# Patient Record
Sex: FEMALE | Race: WHITE | Employment: OTHER | URBAN - NONMETROPOLITAN AREA
[De-identification: names, ages, dates, MRNs, and addresses within clinical notes are randomized per-mention and may not be internally consistent; named-entity substitution may affect disease eponyms.]

---

## 2017-12-17 ENCOUNTER — APPOINTMENT (OUTPATIENT)
Dept: CT IMAGING | Age: 82
End: 2017-12-17
Payer: MEDICARE

## 2017-12-17 ENCOUNTER — APPOINTMENT (OUTPATIENT)
Dept: GENERAL RADIOLOGY | Age: 82
End: 2017-12-17
Payer: MEDICARE

## 2017-12-17 ENCOUNTER — HOSPITAL ENCOUNTER (EMERGENCY)
Age: 82
Discharge: HOME | End: 2017-12-17
Attending: EMERGENCY MEDICINE
Payer: MEDICARE

## 2017-12-17 VITALS
SYSTOLIC BLOOD PRESSURE: 116 MMHG | DIASTOLIC BLOOD PRESSURE: 69 MMHG | RESPIRATION RATE: 14 BRPM | BODY MASS INDEX: 21.26 KG/M2 | HEART RATE: 6 BPM | OXYGEN SATURATION: 96 % | WEIGHT: 120 LBS | HEIGHT: 63 IN | TEMPERATURE: 97.5 F

## 2017-12-17 DIAGNOSIS — R53.81 MALAISE AND FATIGUE: Primary | ICD-10-CM

## 2017-12-17 DIAGNOSIS — R53.83 MALAISE AND FATIGUE: Primary | ICD-10-CM

## 2017-12-17 LAB
ANION GAP SERPL CALCULATED.3IONS-SCNC: 14 MMOL/L (ref 7–19)
APTT: 25.1 SEC (ref 26–36.2)
BILIRUBIN URINE: NEGATIVE
BLOOD, URINE: NEGATIVE
BUN BLDV-MCNC: 33 MG/DL (ref 8–23)
CALCIUM SERPL-MCNC: 9.3 MG/DL (ref 8.2–9.6)
CHLORIDE BLD-SCNC: 101 MMOL/L (ref 98–111)
CLARITY: CLEAR
CO2: 21 MMOL/L (ref 22–29)
COLOR: NORMAL
CREAT SERPL-MCNC: 1.4 MG/DL (ref 0.5–0.9)
GFR NON-AFRICAN AMERICAN: 35
GLUCOSE BLD-MCNC: 117 MG/DL (ref 74–109)
GLUCOSE URINE: NEGATIVE MG/DL
HCT VFR BLD CALC: 32.7 % (ref 37–47)
HEMOGLOBIN: 10.7 G/DL (ref 12–16)
INR BLD: 1.03 (ref 0.88–1.18)
KETONES, URINE: NEGATIVE MG/DL
LEUKOCYTE ESTERASE, URINE: NEGATIVE
MCH RBC QN AUTO: 32.4 PG (ref 27–31)
MCHC RBC AUTO-ENTMCNC: 32.7 G/DL (ref 33–37)
MCV RBC AUTO: 99.1 FL (ref 81–99)
NITRITE, URINE: NEGATIVE
PDW BLD-RTO: 11.9 % (ref 11.5–14.5)
PH UA: 5.5
PLATELET # BLD: 187 K/UL (ref 130–400)
PMV BLD AUTO: 12.1 FL (ref 9.4–12.3)
POTASSIUM SERPL-SCNC: 4.8 MMOL/L (ref 3.5–5)
PROTEIN UA: NEGATIVE MG/DL
PROTHROMBIN TIME: 13.4 SEC (ref 12–14.6)
RBC # BLD: 3.3 M/UL (ref 4.2–5.4)
SODIUM BLD-SCNC: 136 MMOL/L (ref 136–145)
SPECIFIC GRAVITY UA: 1.02
TROPONIN: <0.01 NG/ML (ref 0–0.03)
TSH SERPL DL<=0.05 MIU/L-ACNC: 2.16 UIU/ML (ref 0.27–4.2)
UROBILINOGEN, URINE: 1 E.U./DL
WBC # BLD: 7.8 K/UL (ref 4.8–10.8)

## 2017-12-17 PROCEDURE — 84484 ASSAY OF TROPONIN QUANT: CPT

## 2017-12-17 PROCEDURE — 70450 CT HEAD/BRAIN W/O DYE: CPT

## 2017-12-17 PROCEDURE — 71010 XR CHEST PORTABLE: CPT

## 2017-12-17 PROCEDURE — 99284 EMERGENCY DEPT VISIT MOD MDM: CPT | Performed by: EMERGENCY MEDICINE

## 2017-12-17 PROCEDURE — 99285 EMERGENCY DEPT VISIT HI MDM: CPT

## 2017-12-17 PROCEDURE — 85027 COMPLETE CBC AUTOMATED: CPT

## 2017-12-17 PROCEDURE — 80048 BASIC METABOLIC PNL TOTAL CA: CPT

## 2017-12-17 PROCEDURE — 85610 PROTHROMBIN TIME: CPT

## 2017-12-17 PROCEDURE — 85730 THROMBOPLASTIN TIME PARTIAL: CPT

## 2017-12-17 PROCEDURE — 36415 COLL VENOUS BLD VENIPUNCTURE: CPT

## 2017-12-17 PROCEDURE — 84443 ASSAY THYROID STIM HORMONE: CPT

## 2017-12-17 PROCEDURE — 93005 ELECTROCARDIOGRAM TRACING: CPT

## 2017-12-17 PROCEDURE — 81003 URINALYSIS AUTO W/O SCOPE: CPT

## 2017-12-17 RX ORDER — POTASSIUM CITRATE 10 MEQ/1
TABLET, EXTENDED RELEASE ORAL 2 TIMES DAILY
COMMUNITY

## 2017-12-17 RX ORDER — 0.9 % SODIUM CHLORIDE 0.9 %
1000 INTRAVENOUS SOLUTION INTRAVENOUS ONCE
Status: DISCONTINUED | OUTPATIENT
Start: 2017-12-17 | End: 2017-12-17 | Stop reason: HOSPADM

## 2017-12-17 RX ORDER — LOSARTAN POTASSIUM 100 MG/1
100 TABLET ORAL DAILY
COMMUNITY
End: 2019-08-26

## 2017-12-17 RX ORDER — CARVEDILOL 25 MG/1
25 TABLET ORAL 2 TIMES DAILY WITH MEALS
COMMUNITY

## 2017-12-17 RX ORDER — NIFEDIPINE 90 MG/1
90 TABLET, FILM COATED, EXTENDED RELEASE ORAL DAILY
COMMUNITY
End: 2019-08-26

## 2017-12-17 ASSESSMENT — ENCOUNTER SYMPTOMS
PHOTOPHOBIA: 0
COUGH: 0
BACK PAIN: 0
SHORTNESS OF BREATH: 0
ABDOMINAL PAIN: 0
DIARRHEA: 0
RHINORRHEA: 0
SORE THROAT: 0
CHEST TIGHTNESS: 0
NAUSEA: 0
EYE PAIN: 0
VOMITING: 0

## 2017-12-17 NOTE — CARE COORDINATION
PT and family provided with carat DME info and PCP info on how to obtain walker or cane.  Electronically signed by Luis Manuel Ulrich on 12/17/2017 at 4:23 PM

## 2017-12-17 NOTE — ED PROVIDER NOTES
[facility]  eMERGENCY dEPARTMENT eNCOUnter      Pt Name: Kelly Sher  MRN: [id number]  [place] 9/11/1926  Date of evaluation: 12/17/2017  Provider: Harry Ro MD    [address] [place] [address]       Chief Complaint   Patient presents with    Hypotension       HISTORY OF PRESENT ILLNESS   (Location/Symptom, Timing/Onset, Context/Setting, Quality, Duration, Modifying Factors, Severity)  Note limiting factors. Kelly Sher is a 80 y.o. female who presents to the emergency department For lack of energy, poor appetite. Between 1 and 2 weeks ago the patient had gone to a clinic to get checked out. She states that there are a bunch of people that were sick with possibly \"the flu\" and thinks that that had potentially cause her problems. She states at that time they told her her blood pressure was borderline low with the systolics being between 90 and 756 and her diastolics being between 22L and 70s. Patient is mainly concerned because she gets very tired after walking shorter distances this has been progressive over the past couple weeks. Son notes that she has not had a great appetite and is not eating a lot of food, patient states that she is just not hungry and doesn't want to eat. She denies chest pain, fevers, difficult to breathing, abdominal pain, nausea, vomiting, diarrhea, headaches, vision changes or other associated symptoms. The patient has not had a good appetite or good by mouth intake, does not want to eat, nothing makes her symptoms better or worse. She does not use a cane or other assistive device during ambulation. She states that she feels that she needs to hold onto things while she walks that does not have difficulty with walking. Nursing Notes were reviewed. REVIEW OF SYSTEMS    (2-9 systems for level 4, 10 or more for level 5)     Review of Systems   Constitutional: Positive for activity change, appetite change and fatigue. Negative for chills and fever.    HENT: Negative for congestion, nosebleeds, rhinorrhea and sore throat. Eyes: Negative for photophobia, pain and visual disturbance. Respiratory: Negative for cough, chest tightness and shortness of breath. Cardiovascular: Negative for chest pain and palpitations. Gastrointestinal: Negative for abdominal pain, diarrhea, nausea and vomiting. Genitourinary: Negative for dysuria, flank pain and hematuria. Musculoskeletal: Negative for arthralgias, back pain, myalgias and neck pain. Skin: Negative for rash and wound. Neurological: Positive for weakness. Negative for dizziness, syncope, speech difficulty, light-headedness and headaches. Psychiatric/Behavioral: Negative for confusion, hallucinations and suicidal ideas. PAST MEDICAL HISTORY     Past Medical History:   Diagnosis Date    Hyperlipidemia     Hypertension        SURGICAL HISTORY       Past Surgical History:   Procedure Laterality Date    THYROIDECTOMY         CURRENT MEDICATIONS       Current Discharge Medication List      CONTINUE these medications which have NOT CHANGED    Details   potassium citrate (UROCIT-K) 10 MEQ (1080 MG) extended release tablet Take by mouth 2 times daily      losartan (COZAAR) 100 MG tablet Take 100 mg by mouth daily      carvedilol (COREG) 25 MG tablet Take 25 mg by mouth 2 times daily (with meals)      NIFEdipine (ADALAT CC) 90 MG extended release tablet Take 90 mg by mouth daily             ALLERGIES     Review of patient's allergies indicates no known allergies. FAMILY HISTORY     History reviewed. No pertinent family history. SOCIAL HISTORY       Social History     Social History    Marital status:       Spouse name: N/A    Number of children: N/A    Years of education: N/A     Social History Main Topics    Smoking status: Never Smoker    Smokeless tobacco: Never Used    Alcohol use No    Drug use: No    Sexual activity: Not Asked     Other Topics Concern    None     Social History Narrative    ST elevations or depressions with in contiguous leads that are consistent with STEMI or ischemia, poor baseline,  ms, QRS 95 ms,  ms. RADIOLOGY:   Non-plain film images such as CT, Ultrasound and MRI are read by the radiologist. Plain radiographic images are visualized and preliminarily interpreted by the emergency physician with the below findings:    CT Head WO Contrast   Final Result   Impression:   No acute infarction, hemorrhage, or mass. Signed by Dr Con Chen on 12/17/2017 4:20 PM      XR Chest Portable   Final Result   Impression:   No acute findings. Signed by Dr Con Chen on 12/17/2017 4:16 PM          LABS:  Labs Reviewed   APTT - Abnormal; Notable for the following:        Result Value    aPTT 25.1 (*)     All other components within normal limits   BASIC METABOLIC PANEL - Abnormal; Notable for the following:     CO2 21 (*)     Glucose 117 (*)     BUN 33 (*)     CREATININE 1.4 (*)     GFR Non- 35 (*)     All other components within normal limits   CBC - Abnormal; Notable for the following:     RBC 3.30 (*)     Hemoglobin 10.7 (*)     Hematocrit 32.7 (*)     MCV 99.1 (*)     MCH 32.4 (*)     MCHC 32.7 (*)     All other components within normal limits   PROTIME-INR   TSH WITHOUT REFLEX   TROPONIN   URINALYSIS       All other labs were within normal range or not returned as of this dictation. EMERGENCY DEPARTMENT COURSE and DIFFERENTIAL DIAGNOSIS/MDM:   Vitals:    Vitals:    12/17/17 1322   BP: 107/74   Pulse: 90   Resp: 18   Temp: 98.5 °F (36.9 °C)   SpO2: 94%   Weight: 120 lb (54.4 kg)   Height: 5' 3\" (1.6 m)       MDM  Number of Diagnoses or Management Options  Diagnosis management comments: 22-year-old female with progressive generalized weakness and difficulty with walking longer distances, poor appetite. We'll evaluate for any acute life-threatening causes including CNS, cardiovascular, infectious.  Low suspicion for serious life-threatening etiology at this point

## 2017-12-17 NOTE — ED TRIAGE NOTES
Pt to ED with c/o hypotension. Pt reports \"not feeling well\" after going to clinic for unrelated issue.   Pt reports symptoms x2 weeks

## 2017-12-18 LAB
EKG P AXIS: 94 DEGREES
EKG P-R INTERVAL: 236 MS
EKG Q-T INTERVAL: 380 MS
EKG QRS DURATION: 80 MS
EKG QTC CALCULATION (BAZETT): 406 MS
EKG T AXIS: 23 DEGREES

## 2018-01-18 ENCOUNTER — HOSPITAL ENCOUNTER (INPATIENT)
Age: 83
LOS: 3 days | Discharge: HOME | DRG: 470 | End: 2018-01-21
Attending: FAMILY MEDICINE | Admitting: INTERNAL MEDICINE
Payer: MEDICARE

## 2018-01-18 ENCOUNTER — ANESTHESIA (OUTPATIENT)
Dept: OPERATING ROOM | Age: 83
DRG: 470 | End: 2018-01-18
Payer: MEDICARE

## 2018-01-18 ENCOUNTER — APPOINTMENT (OUTPATIENT)
Dept: GENERAL RADIOLOGY | Age: 83
DRG: 470 | End: 2018-01-18
Payer: MEDICARE

## 2018-01-18 ENCOUNTER — ANESTHESIA EVENT (OUTPATIENT)
Dept: OPERATING ROOM | Age: 83
DRG: 470 | End: 2018-01-18
Payer: MEDICARE

## 2018-01-18 VITALS
OXYGEN SATURATION: 100 % | DIASTOLIC BLOOD PRESSURE: 63 MMHG | SYSTOLIC BLOOD PRESSURE: 154 MMHG | RESPIRATION RATE: 4 BRPM | TEMPERATURE: 97.4 F

## 2018-01-18 DIAGNOSIS — S72.001A CLOSED FRACTURE OF RIGHT HIP, INITIAL ENCOUNTER (HCC): Primary | ICD-10-CM

## 2018-01-18 LAB
ABO/RH: NORMAL
ALBUMIN SERPL-MCNC: 3.4 G/DL (ref 3.5–5.2)
ALP BLD-CCNC: 62 U/L (ref 35–104)
ALT SERPL-CCNC: 14 U/L (ref 5–33)
ANION GAP SERPL CALCULATED.3IONS-SCNC: 12 MMOL/L (ref 7–19)
ANTIBODY SCREEN: NORMAL
AST SERPL-CCNC: 22 U/L (ref 5–32)
BASOPHILS ABSOLUTE: 0 K/UL (ref 0–0.2)
BASOPHILS RELATIVE PERCENT: 0.3 % (ref 0–1)
BILIRUB SERPL-MCNC: 1 MG/DL (ref 0.2–1.2)
BUN BLDV-MCNC: 19 MG/DL (ref 8–23)
CALCIUM SERPL-MCNC: 8.8 MG/DL (ref 8.2–9.6)
CHLORIDE BLD-SCNC: 94 MMOL/L (ref 98–111)
CO2: 25 MMOL/L (ref 22–29)
CREAT SERPL-MCNC: 0.8 MG/DL (ref 0.5–0.9)
EOSINOPHILS ABSOLUTE: 0.2 K/UL (ref 0–0.6)
EOSINOPHILS RELATIVE PERCENT: 2 % (ref 0–5)
GFR NON-AFRICAN AMERICAN: >60
GLUCOSE BLD-MCNC: 128 MG/DL (ref 74–109)
HCT VFR BLD CALC: 30.4 % (ref 37–47)
HEMOGLOBIN: 9.7 G/DL (ref 12–16)
INR BLD: 1.02 (ref 0.88–1.18)
LYMPHOCYTES ABSOLUTE: 0.6 K/UL (ref 1.1–4.5)
LYMPHOCYTES RELATIVE PERCENT: 5.8 % (ref 20–40)
MCH RBC QN AUTO: 32.6 PG (ref 27–31)
MCHC RBC AUTO-ENTMCNC: 31.9 G/DL (ref 33–37)
MCV RBC AUTO: 102 FL (ref 81–99)
MONOCYTES ABSOLUTE: 0.5 K/UL (ref 0–0.9)
MONOCYTES RELATIVE PERCENT: 4.1 % (ref 0–10)
NEUTROPHILS ABSOLUTE: 9.5 K/UL (ref 1.5–7.5)
NEUTROPHILS RELATIVE PERCENT: 87.4 % (ref 50–65)
PDW BLD-RTO: 14.8 % (ref 11.5–14.5)
PLATELET # BLD: 221 K/UL (ref 130–400)
PMV BLD AUTO: 11.4 FL (ref 9.4–12.3)
POTASSIUM SERPL-SCNC: 4.3 MMOL/L (ref 3.5–5)
PROTHROMBIN TIME: 13.3 SEC (ref 12–14.6)
RBC # BLD: 2.98 M/UL (ref 4.2–5.4)
SODIUM BLD-SCNC: 131 MMOL/L (ref 136–145)
TOTAL PROTEIN: 6.1 G/DL (ref 6.6–8.7)
TROPONIN: <0.01 NG/ML (ref 0–0.03)
WBC # BLD: 10.9 K/UL (ref 4.8–10.8)

## 2018-01-18 PROCEDURE — 7100000000 HC PACU RECOVERY - FIRST 15 MIN: Performed by: ORTHOPAEDIC SURGERY

## 2018-01-18 PROCEDURE — 2580000003 HC RX 258: Performed by: FAMILY MEDICINE

## 2018-01-18 PROCEDURE — 80053 COMPREHEN METABOLIC PANEL: CPT

## 2018-01-18 PROCEDURE — 3700000001 HC ADD 15 MINUTES (ANESTHESIA): Performed by: ORTHOPAEDIC SURGERY

## 2018-01-18 PROCEDURE — C1776 JOINT DEVICE (IMPLANTABLE): HCPCS | Performed by: ORTHOPAEDIC SURGERY

## 2018-01-18 PROCEDURE — 2580000003 HC RX 258: Performed by: ORTHOPAEDIC SURGERY

## 2018-01-18 PROCEDURE — C1729 CATH, DRAINAGE: HCPCS | Performed by: ORTHOPAEDIC SURGERY

## 2018-01-18 PROCEDURE — 99285 EMERGENCY DEPT VISIT HI MDM: CPT

## 2018-01-18 PROCEDURE — 0SRR0J9 REPLACEMENT OF RIGHT HIP JOINT, FEMORAL SURFACE WITH SYNTHETIC SUBSTITUTE, CEMENTED, OPEN APPROACH: ICD-10-PCS | Performed by: ORTHOPAEDIC SURGERY

## 2018-01-18 PROCEDURE — 99285 EMERGENCY DEPT VISIT HI MDM: CPT | Performed by: FAMILY MEDICINE

## 2018-01-18 PROCEDURE — 2500000003 HC RX 250 WO HCPCS

## 2018-01-18 PROCEDURE — 71045 X-RAY EXAM CHEST 1 VIEW: CPT

## 2018-01-18 PROCEDURE — 73502 X-RAY EXAM HIP UNI 2-3 VIEWS: CPT

## 2018-01-18 PROCEDURE — 36415 COLL VENOUS BLD VENIPUNCTURE: CPT

## 2018-01-18 PROCEDURE — 1210000000 HC MED SURG R&B

## 2018-01-18 PROCEDURE — 86850 RBC ANTIBODY SCREEN: CPT

## 2018-01-18 PROCEDURE — 6360000002 HC RX W HCPCS: Performed by: FAMILY MEDICINE

## 2018-01-18 PROCEDURE — 2500000003 HC RX 250 WO HCPCS: Performed by: ORTHOPAEDIC SURGERY

## 2018-01-18 PROCEDURE — C1713 ANCHOR/SCREW BN/BN,TIS/BN: HCPCS | Performed by: ORTHOPAEDIC SURGERY

## 2018-01-18 PROCEDURE — 7100000001 HC PACU RECOVERY - ADDTL 15 MIN: Performed by: ORTHOPAEDIC SURGERY

## 2018-01-18 PROCEDURE — 3700000000 HC ANESTHESIA ATTENDED CARE: Performed by: ORTHOPAEDIC SURGERY

## 2018-01-18 PROCEDURE — 3600000015 HC SURGERY LEVEL 5 ADDTL 15MIN: Performed by: ORTHOPAEDIC SURGERY

## 2018-01-18 PROCEDURE — 84484 ASSAY OF TROPONIN QUANT: CPT

## 2018-01-18 PROCEDURE — A4364 ADHESIVE, LIQUID OR EQUAL: HCPCS | Performed by: ORTHOPAEDIC SURGERY

## 2018-01-18 PROCEDURE — 99222 1ST HOSP IP/OBS MODERATE 55: CPT | Performed by: INTERNAL MEDICINE

## 2018-01-18 PROCEDURE — 6360000002 HC RX W HCPCS

## 2018-01-18 PROCEDURE — 85610 PROTHROMBIN TIME: CPT

## 2018-01-18 PROCEDURE — A6252 ABSORPT DRG >16 <=48 W/O BDR: HCPCS | Performed by: ORTHOPAEDIC SURGERY

## 2018-01-18 PROCEDURE — 6370000000 HC RX 637 (ALT 250 FOR IP): Performed by: ORTHOPAEDIC SURGERY

## 2018-01-18 PROCEDURE — 6360000002 HC RX W HCPCS: Performed by: ORTHOPAEDIC SURGERY

## 2018-01-18 PROCEDURE — C1769 GUIDE WIRE: HCPCS | Performed by: ORTHOPAEDIC SURGERY

## 2018-01-18 PROCEDURE — 2720000001 HC MISC SURG SUPPLY STERILE $51-500: Performed by: ORTHOPAEDIC SURGERY

## 2018-01-18 PROCEDURE — 3600000005 HC SURGERY LEVEL 5 BASE: Performed by: ORTHOPAEDIC SURGERY

## 2018-01-18 PROCEDURE — 86901 BLOOD TYPING SEROLOGIC RH(D): CPT

## 2018-01-18 PROCEDURE — 3209999900 FLUORO FOR SURGICAL PROCEDURES

## 2018-01-18 PROCEDURE — 2780000010 HC IMPLANT OTHER: Performed by: ORTHOPAEDIC SURGERY

## 2018-01-18 PROCEDURE — 86900 BLOOD TYPING SEROLOGIC ABO: CPT

## 2018-01-18 PROCEDURE — 85025 COMPLETE CBC W/AUTO DIFF WBC: CPT

## 2018-01-18 PROCEDURE — 93005 ELECTROCARDIOGRAM TRACING: CPT

## 2018-01-18 DEVICE — KIT BNE CEM PREP FEM QUIK-USE 1 PER CA: Type: IMPLANTABLE DEVICE | Site: HIP | Status: FUNCTIONAL

## 2018-01-18 DEVICE — IMPLANTABLE DEVICE
Type: IMPLANTABLE DEVICE | Site: HIP | Status: FUNCTIONAL
Brand: SIGNATURE FEMORAL HEAD

## 2018-01-18 DEVICE — BIPOLAR HEAD SIZE 28/47
Type: IMPLANTABLE DEVICE | Site: HIP | Status: FUNCTIONAL
Brand: PAXEON BIPOLAR HEAD

## 2018-01-18 DEVICE — DISCONTINUED USE 416978 CEMENT PALACOS R SING DOSE 40GR: Type: IMPLANTABLE DEVICE | Site: HIP | Status: FUNCTIONAL

## 2018-01-18 DEVICE — ORIGIN STEM, STANDARD OFFSET SIZE 13 COLLARED
Type: IMPLANTABLE DEVICE | Site: HIP | Status: FUNCTIONAL
Brand: PAXEON ORIGIN

## 2018-01-18 DEVICE — CEMENT BNE 20ML 40GM HALF DOSE PMMA W/O GENT HI VISC RADPQ: Type: IMPLANTABLE DEVICE | Site: HIP | Status: FUNCTIONAL

## 2018-01-18 RX ORDER — HYDROMORPHONE HCL 110MG/55ML
0.5 PATIENT CONTROLLED ANALGESIA SYRINGE INTRAVENOUS EVERY 5 MIN PRN
Status: DISCONTINUED | OUTPATIENT
Start: 2018-01-18 | End: 2018-01-18 | Stop reason: HOSPADM

## 2018-01-18 RX ORDER — FENTANYL CITRATE 50 UG/ML
50 INJECTION, SOLUTION INTRAMUSCULAR; INTRAVENOUS
Status: ACTIVE | OUTPATIENT
Start: 2018-01-18 | End: 2018-01-18

## 2018-01-18 RX ORDER — HYDRALAZINE HYDROCHLORIDE 20 MG/ML
5 INJECTION INTRAMUSCULAR; INTRAVENOUS EVERY 10 MIN PRN
Status: DISCONTINUED | OUTPATIENT
Start: 2018-01-18 | End: 2018-01-18 | Stop reason: HOSPADM

## 2018-01-18 RX ORDER — EPHEDRINE SULFATE 50 MG/ML
INJECTION, SOLUTION INTRAVENOUS PRN
Status: DISCONTINUED | OUTPATIENT
Start: 2018-01-18 | End: 2018-01-18 | Stop reason: SDUPTHER

## 2018-01-18 RX ORDER — MORPHINE SULFATE 4 MG/ML
2 INJECTION, SOLUTION INTRAMUSCULAR; INTRAVENOUS EVERY 5 MIN PRN
Status: DISCONTINUED | OUTPATIENT
Start: 2018-01-18 | End: 2018-01-18 | Stop reason: HOSPADM

## 2018-01-18 RX ORDER — MEPERIDINE HYDROCHLORIDE 50 MG/ML
12.5 INJECTION INTRAMUSCULAR; INTRAVENOUS; SUBCUTANEOUS EVERY 5 MIN PRN
Status: DISCONTINUED | OUTPATIENT
Start: 2018-01-18 | End: 2018-01-18 | Stop reason: HOSPADM

## 2018-01-18 RX ORDER — MORPHINE SULFATE 4 MG/ML
4 INJECTION, SOLUTION INTRAMUSCULAR; INTRAVENOUS EVERY 5 MIN PRN
Status: DISCONTINUED | OUTPATIENT
Start: 2018-01-18 | End: 2018-01-18 | Stop reason: HOSPADM

## 2018-01-18 RX ORDER — LABETALOL HYDROCHLORIDE 5 MG/ML
5 INJECTION, SOLUTION INTRAVENOUS EVERY 10 MIN PRN
Status: DISCONTINUED | OUTPATIENT
Start: 2018-01-18 | End: 2018-01-18 | Stop reason: HOSPADM

## 2018-01-18 RX ORDER — ROCURONIUM BROMIDE 10 MG/ML
INJECTION, SOLUTION INTRAVENOUS PRN
Status: DISCONTINUED | OUTPATIENT
Start: 2018-01-18 | End: 2018-01-18 | Stop reason: SDUPTHER

## 2018-01-18 RX ORDER — SUCCINYLCHOLINE CHLORIDE 20 MG/ML
INJECTION INTRAMUSCULAR; INTRAVENOUS PRN
Status: DISCONTINUED | OUTPATIENT
Start: 2018-01-18 | End: 2018-01-18 | Stop reason: SDUPTHER

## 2018-01-18 RX ORDER — DIPHENHYDRAMINE HYDROCHLORIDE 50 MG/ML
12.5 INJECTION INTRAMUSCULAR; INTRAVENOUS
Status: DISCONTINUED | OUTPATIENT
Start: 2018-01-18 | End: 2018-01-18 | Stop reason: HOSPADM

## 2018-01-18 RX ORDER — ONDANSETRON 2 MG/ML
2 INJECTION INTRAMUSCULAR; INTRAVENOUS ONCE
Status: COMPLETED | OUTPATIENT
Start: 2018-01-18 | End: 2018-01-18

## 2018-01-18 RX ORDER — MORPHINE SULFATE 4 MG/ML
2 INJECTION, SOLUTION INTRAMUSCULAR; INTRAVENOUS ONCE
Status: COMPLETED | OUTPATIENT
Start: 2018-01-18 | End: 2018-01-18

## 2018-01-18 RX ORDER — ENALAPRILAT 2.5 MG/2ML
1.25 INJECTION INTRAVENOUS
Status: DISCONTINUED | OUTPATIENT
Start: 2018-01-18 | End: 2018-01-18 | Stop reason: HOSPADM

## 2018-01-18 RX ORDER — SODIUM CHLORIDE, SODIUM LACTATE, POTASSIUM CHLORIDE, CALCIUM CHLORIDE 600; 310; 30; 20 MG/100ML; MG/100ML; MG/100ML; MG/100ML
INJECTION, SOLUTION INTRAVENOUS CONTINUOUS
Status: DISCONTINUED | OUTPATIENT
Start: 2018-01-18 | End: 2018-01-19

## 2018-01-18 RX ORDER — LIDOCAINE HYDROCHLORIDE 10 MG/ML
INJECTION, SOLUTION EPIDURAL; INFILTRATION; INTRACAUDAL; PERINEURAL PRN
Status: DISCONTINUED | OUTPATIENT
Start: 2018-01-18 | End: 2018-01-18 | Stop reason: SDUPTHER

## 2018-01-18 RX ORDER — SODIUM CHLORIDE 0.9 % (FLUSH) 0.9 %
10 SYRINGE (ML) INJECTION EVERY 12 HOURS SCHEDULED
Status: DISCONTINUED | OUTPATIENT
Start: 2018-01-18 | End: 2018-01-19

## 2018-01-18 RX ORDER — HYDROMORPHONE HCL 110MG/55ML
0.25 PATIENT CONTROLLED ANALGESIA SYRINGE INTRAVENOUS EVERY 5 MIN PRN
Status: DISCONTINUED | OUTPATIENT
Start: 2018-01-18 | End: 2018-01-18 | Stop reason: HOSPADM

## 2018-01-18 RX ORDER — PROMETHAZINE HYDROCHLORIDE 25 MG/ML
6.25 INJECTION, SOLUTION INTRAMUSCULAR; INTRAVENOUS
Status: DISCONTINUED | OUTPATIENT
Start: 2018-01-18 | End: 2018-01-18 | Stop reason: HOSPADM

## 2018-01-18 RX ORDER — PROPOFOL 10 MG/ML
INJECTION, EMULSION INTRAVENOUS PRN
Status: DISCONTINUED | OUTPATIENT
Start: 2018-01-18 | End: 2018-01-18 | Stop reason: SDUPTHER

## 2018-01-18 RX ORDER — DEXAMETHASONE SODIUM PHOSPHATE 10 MG/ML
INJECTION INTRAMUSCULAR; INTRAVENOUS PRN
Status: DISCONTINUED | OUTPATIENT
Start: 2018-01-18 | End: 2018-01-18 | Stop reason: SDUPTHER

## 2018-01-18 RX ORDER — FENTANYL CITRATE 50 UG/ML
25 INJECTION, SOLUTION INTRAMUSCULAR; INTRAVENOUS
Status: ACTIVE | OUTPATIENT
Start: 2018-01-18 | End: 2018-01-18

## 2018-01-18 RX ORDER — TRANEXAMIC ACID 100 MG/ML
INJECTION, SOLUTION INTRAVENOUS PRN
Status: DISCONTINUED | OUTPATIENT
Start: 2018-01-18 | End: 2018-01-18 | Stop reason: SDUPTHER

## 2018-01-18 RX ORDER — SODIUM CHLORIDE 0.9 % (FLUSH) 0.9 %
10 SYRINGE (ML) INJECTION PRN
Status: DISCONTINUED | OUTPATIENT
Start: 2018-01-18 | End: 2018-01-19

## 2018-01-18 RX ORDER — ONDANSETRON 2 MG/ML
INJECTION INTRAMUSCULAR; INTRAVENOUS PRN
Status: DISCONTINUED | OUTPATIENT
Start: 2018-01-18 | End: 2018-01-18 | Stop reason: SDUPTHER

## 2018-01-18 RX ORDER — METOCLOPRAMIDE HYDROCHLORIDE 5 MG/ML
10 INJECTION INTRAMUSCULAR; INTRAVENOUS
Status: DISCONTINUED | OUTPATIENT
Start: 2018-01-18 | End: 2018-01-18 | Stop reason: HOSPADM

## 2018-01-18 RX ORDER — MIDAZOLAM HYDROCHLORIDE 1 MG/ML
2 INJECTION INTRAMUSCULAR; INTRAVENOUS
Status: ACTIVE | OUTPATIENT
Start: 2018-01-18 | End: 2018-01-18

## 2018-01-18 RX ORDER — CEFAZOLIN SODIUM 1 G/3ML
INJECTION, POWDER, FOR SOLUTION INTRAMUSCULAR; INTRAVENOUS PRN
Status: DISCONTINUED | OUTPATIENT
Start: 2018-01-18 | End: 2018-01-18 | Stop reason: SDUPTHER

## 2018-01-18 RX ORDER — SODIUM CHLORIDE 9 MG/ML
INJECTION, SOLUTION INTRAVENOUS CONTINUOUS
Status: DISCONTINUED | OUTPATIENT
Start: 2018-01-18 | End: 2018-01-19

## 2018-01-18 RX ORDER — GLYCOPYRROLATE 0.2 MG/ML
INJECTION INTRAMUSCULAR; INTRAVENOUS PRN
Status: DISCONTINUED | OUTPATIENT
Start: 2018-01-18 | End: 2018-01-18 | Stop reason: SDUPTHER

## 2018-01-18 RX ORDER — LIDOCAINE HYDROCHLORIDE 10 MG/ML
1 INJECTION, SOLUTION EPIDURAL; INFILTRATION; INTRACAUDAL; PERINEURAL
Status: ACTIVE | OUTPATIENT
Start: 2018-01-18 | End: 2018-01-18

## 2018-01-18 RX ORDER — FENTANYL CITRATE 50 UG/ML
INJECTION, SOLUTION INTRAMUSCULAR; INTRAVENOUS PRN
Status: DISCONTINUED | OUTPATIENT
Start: 2018-01-18 | End: 2018-01-18 | Stop reason: SDUPTHER

## 2018-01-18 RX ADMIN — PROPOFOL 160 MG: 10 INJECTION, EMULSION INTRAVENOUS at 17:53

## 2018-01-18 RX ADMIN — GLYCOPYRROLATE 0.2 MG: 0.2 INJECTION, SOLUTION INTRAMUSCULAR; INTRAVENOUS at 18:03

## 2018-01-18 RX ADMIN — PHENYLEPHRINE HYDROCHLORIDE 200 MCG: 10 INJECTION INTRAVENOUS at 18:41

## 2018-01-18 RX ADMIN — EPHEDRINE SULFATE 10 MG: 50 INJECTION, SOLUTION INTRAMUSCULAR; INTRAVENOUS; SUBCUTANEOUS at 18:06

## 2018-01-18 RX ADMIN — SODIUM CHLORIDE: 9 INJECTION, SOLUTION INTRAVENOUS at 19:16

## 2018-01-18 RX ADMIN — TRANEXAMIC ACID 1000 MG: 100 INJECTION, SOLUTION INTRAVENOUS at 18:07

## 2018-01-18 RX ADMIN — GLYCOPYRROLATE 0.4 MG: 0.2 INJECTION, SOLUTION INTRAMUSCULAR; INTRAVENOUS at 19:00

## 2018-01-18 RX ADMIN — SODIUM CHLORIDE 125 ML: 9 INJECTION, SOLUTION INTRAVENOUS at 16:20

## 2018-01-18 RX ADMIN — FENTANYL CITRATE 100 MCG: 50 INJECTION, SOLUTION INTRAMUSCULAR; INTRAVENOUS at 19:20

## 2018-01-18 RX ADMIN — PHENYLEPHRINE HYDROCHLORIDE 200 MCG: 10 INJECTION INTRAVENOUS at 18:27

## 2018-01-18 RX ADMIN — FENTANYL CITRATE 100 MCG: 50 INJECTION, SOLUTION INTRAMUSCULAR; INTRAVENOUS at 17:49

## 2018-01-18 RX ADMIN — FENTANYL CITRATE 50 MCG: 50 INJECTION, SOLUTION INTRAMUSCULAR; INTRAVENOUS at 19:07

## 2018-01-18 RX ADMIN — TRANEXAMIC ACID 1000 MG: 100 INJECTION, SOLUTION INTRAVENOUS at 19:03

## 2018-01-18 RX ADMIN — Medication 2 MG: at 16:20

## 2018-01-18 RX ADMIN — CEFAZOLIN 2000 MG: 1 INJECTION, POWDER, FOR SOLUTION INTRAMUSCULAR; INTRAVENOUS; PARENTERAL at 18:06

## 2018-01-18 RX ADMIN — EPHEDRINE SULFATE 10 MG: 50 INJECTION, SOLUTION INTRAMUSCULAR; INTRAVENOUS; SUBCUTANEOUS at 18:00

## 2018-01-18 RX ADMIN — ONDANSETRON 2 MG: 2 INJECTION INTRAMUSCULAR; INTRAVENOUS at 16:20

## 2018-01-18 RX ADMIN — LIDOCAINE HYDROCHLORIDE 50 MG: 10 INJECTION, SOLUTION EPIDURAL; INFILTRATION; INTRACAUDAL; PERINEURAL at 17:53

## 2018-01-18 RX ADMIN — DEXAMETHASONE SODIUM PHOSPHATE 10 MG: 10 INJECTION INTRAMUSCULAR; INTRAVENOUS at 18:00

## 2018-01-18 RX ADMIN — ROCURONIUM BROMIDE 20 MG: 10 INJECTION INTRAVENOUS at 18:10

## 2018-01-18 RX ADMIN — ONDANSETRON HYDROCHLORIDE 4 MG: 2 SOLUTION INTRAMUSCULAR; INTRAVENOUS at 18:00

## 2018-01-18 RX ADMIN — Medication 140 MG: at 17:53

## 2018-01-18 RX ADMIN — NEOSTIGMINE METHYLSULFATE 4 MG: 1 INJECTION, SOLUTION INTRAMUSCULAR; INTRAVENOUS; SUBCUTANEOUS at 19:03

## 2018-01-18 ASSESSMENT — LIFESTYLE VARIABLES: SMOKING_STATUS: 0

## 2018-01-18 ASSESSMENT — PAIN DESCRIPTION - LOCATION: LOCATION: HIP

## 2018-01-18 ASSESSMENT — ENCOUNTER SYMPTOMS
PHOTOPHOBIA: 0
WHEEZING: 0
SINUS PAIN: 0
RHINORRHEA: 0
SHORTNESS OF BREATH: 0
NAUSEA: 0
BLOOD IN STOOL: 0
BACK PAIN: 0
COLOR CHANGE: 0
DIARRHEA: 0
VOMITING: 0
CHEST TIGHTNESS: 0
ABDOMINAL PAIN: 0
ABDOMINAL DISTENTION: 0
CONSTIPATION: 0
COUGH: 0

## 2018-01-18 ASSESSMENT — PAIN DESCRIPTION - PAIN TYPE: TYPE: ACUTE PAIN

## 2018-01-18 ASSESSMENT — PAIN SCALES - GENERAL
PAINLEVEL_OUTOF10: 0
PAINLEVEL_OUTOF10: 5
PAINLEVEL_OUTOF10: 0
PAINLEVEL_OUTOF10: 2
PAINLEVEL_OUTOF10: 0

## 2018-01-18 ASSESSMENT — PAIN DESCRIPTION - ORIENTATION: ORIENTATION: RIGHT

## 2018-01-18 NOTE — ED PROVIDER NOTES
[facility]  eMERGENCY dEPARTMENT eNCOUnter      Pt Name: Celeste Calix  MRN: [id number]  [place] 9/11/1926  Date of evaluation: 1/18/2018  Provider: Ehsan Del Toro MD    [address] [place] [address]       Chief Complaint   Patient presents with   [place] Stairs    Hip Pain     right         HISTORY OF PRESENT ILLNESS   (Location/Symptom, Timing/Onset, Context/Setting, Quality, Duration, Modifying Factors, Severity)  Note limiting factors. Celeste Calix is a 80 y.o. female who presents to the emergency department Fall with right sided hip pain. Patient states that this morning at 3 AM she was walking into the kitchen with the lights off she had reached for the refrigerator for balance but was an error that the refrigerator was there ended up falling in the floor. Patient immediately cried out for family they put her in the bed patient states she has been unable to walk any movement causes severe pain even remaining still causes mild discomfort of the right hip. She incurred no other injury such as head injury no loss of consciousness. She states the last time that she ate was last night's upper has not ate today. HPI    Nursing Notes were reviewed. REVIEW OF SYSTEMS    (2-9 systems for level 4, 10 or more for level 5)     Review of Systems   Constitutional: Positive for appetite change (Decreased appetite over the past year or so family has started ensure which is seemed to help some). Negative for activity change, chills, diaphoresis, fatigue, fever and unexpected weight change. HENT: Negative for congestion, rhinorrhea and sinus pain. Eyes: Negative for photophobia. Respiratory: Negative for cough, chest tightness, shortness of breath and wheezing. Cardiovascular: Negative for chest pain, palpitations and leg swelling. Gastrointestinal: Negative for abdominal distention, abdominal pain, blood in stool, constipation, diarrhea, nausea and vomiting.    Endocrine: Negative for cold intolerance and Scale Score: 15        PHYSICAL EXAM    (up to 7 for level 4, 8 or more for level 5)     ED Triage Vitals [01/18/18 1352]   BP Temp Temp src Pulse Resp SpO2 Height Weight   111/70 98.3 °F (36.8 °C) -- 88 16 92 % 5' (1.524 m) 120 lb (54.4 kg)       Physical Exam   Constitutional: She is oriented to person, place, and time. She appears well-developed and well-nourished. No distress. HENT:   Head: Normocephalic. Eyes: Conjunctivae and EOM are normal. Pupils are equal, round, and reactive to light. Neck: Normal range of motion. Neck supple. No tracheal deviation present. No thyromegaly present. Cardiovascular: Normal rate, normal heart sounds and intact distal pulses. Pulmonary/Chest: Effort normal and breath sounds normal.   Abdominal: Soft. Bowel sounds are normal. She exhibits no distension and no mass. There is no tenderness. There is no rebound and no guarding. Musculoskeletal: She exhibits no edema. Right hip: She exhibits decreased range of motion, tenderness and bony tenderness. Legs:  Neurological: She is alert and oriented to person, place, and time. No cranial nerve deficit. Skin: Skin is warm and dry. No rash noted. She is not diaphoretic. No erythema. No pallor. Psychiatric: She has a normal mood and affect. Her behavior is normal.       DIAGNOSTIC RESULTS     EKG: All EKG's are interpreted by the Emergency Department Physician who either signs or Co-signs this chart in the absence of a cardiologist.        RADIOLOGY:   Non-plain film images such as CT, Ultrasound and MRI are read by the radiologist. Plain radiographic images are visualized and preliminarily interpreted by the emergency physician with the below findings:          XR HIP 2-3 VW W PELVIS RIGHT   Final Result   Status post right hip arthroplasty without apparent   complication. Signed by Dr Shelli Murdock.  Gricel on 1/18/2018 7:39 PM      XR HIP 2-3 VW W PELVIS RIGHT   Final Result   A moderately impacted noted below, none     Procedures    FINAL IMPRESSION      1.  Closed fracture of right hip, initial encounter [facility])          [address] Admitted 01/18/2018 05:31:22 PM      PATIENT REFERRED TO:  Lilian Snider  [phone]            DISCHARGE MEDICATIONS:  Current Discharge Medication List             (Please note that portions of this note were completed with a voice recognition program.  Efforts were made to edit the dictations but occasionally words are mis-transcribed.)    Marshall Moralez MD (electronically signed)  Attending Emergency Physician          Jaydon Duenas MD  01/18/18 0338

## 2018-01-19 LAB
ANION GAP SERPL CALCULATED.3IONS-SCNC: 13 MMOL/L (ref 7–19)
BACTERIA: ABNORMAL /HPF
BASOPHILS ABSOLUTE: 0 K/UL (ref 0–0.2)
BASOPHILS RELATIVE PERCENT: 0.1 % (ref 0–1)
BILIRUBIN URINE: NEGATIVE
BLOOD, URINE: NEGATIVE
BUN BLDV-MCNC: 22 MG/DL (ref 8–23)
CALCIUM SERPL-MCNC: 7.9 MG/DL (ref 8.2–9.6)
CHLORIDE BLD-SCNC: 101 MMOL/L (ref 98–111)
CLARITY: ABNORMAL
CO2: 22 MMOL/L (ref 22–29)
COLOR: YELLOW
CREAT SERPL-MCNC: 1 MG/DL (ref 0.5–0.9)
EOSINOPHILS ABSOLUTE: 0 K/UL (ref 0–0.6)
EOSINOPHILS RELATIVE PERCENT: 0.1 % (ref 0–5)
EPITHELIAL CELLS, UA: 0 /HPF (ref 0–5)
GFR NON-AFRICAN AMERICAN: 52
GLUCOSE BLD-MCNC: 141 MG/DL (ref 74–109)
GLUCOSE URINE: NEGATIVE MG/DL
HCT VFR BLD CALC: 25.7 % (ref 37–47)
HEMOGLOBIN: 8.1 G/DL (ref 12–16)
HYALINE CASTS: 18 /HPF (ref 0–8)
KETONES, URINE: NEGATIVE MG/DL
LEUKOCYTE ESTERASE, URINE: ABNORMAL
LYMPHOCYTES ABSOLUTE: 0.3 K/UL (ref 1.1–4.5)
LYMPHOCYTES RELATIVE PERCENT: 3.4 % (ref 20–40)
MCH RBC QN AUTO: 33.3 PG (ref 27–31)
MCHC RBC AUTO-ENTMCNC: 31.5 G/DL (ref 33–37)
MCV RBC AUTO: 105.8 FL (ref 81–99)
MONOCYTES ABSOLUTE: 0.2 K/UL (ref 0–0.9)
MONOCYTES RELATIVE PERCENT: 2.7 % (ref 0–10)
NEUTROPHILS ABSOLUTE: 7.4 K/UL (ref 1.5–7.5)
NEUTROPHILS RELATIVE PERCENT: 93.3 % (ref 50–65)
NITRITE, URINE: NEGATIVE
PDW BLD-RTO: 14.6 % (ref 11.5–14.5)
PH UA: 6
PLATELET # BLD: 173 K/UL (ref 130–400)
PMV BLD AUTO: 11.2 FL (ref 9.4–12.3)
POTASSIUM REFLEX MAGNESIUM: 4.5 MMOL/L (ref 3.5–5)
PROTEIN UA: 30 MG/DL
RBC # BLD: 2.43 M/UL (ref 4.2–5.4)
RBC UA: 3 /HPF (ref 0–4)
SODIUM BLD-SCNC: 136 MMOL/L (ref 136–145)
SPECIFIC GRAVITY UA: 1.01
UROBILINOGEN, URINE: 0.2 E.U./DL
WBC # BLD: 7.9 K/UL (ref 4.8–10.8)
WBC UA: 95 /HPF (ref 0–5)

## 2018-01-19 PROCEDURE — 2700000000 HC OXYGEN THERAPY PER DAY

## 2018-01-19 PROCEDURE — 36415 COLL VENOUS BLD VENIPUNCTURE: CPT

## 2018-01-19 PROCEDURE — G8978 MOBILITY CURRENT STATUS: HCPCS

## 2018-01-19 PROCEDURE — G8987 SELF CARE CURRENT STATUS: HCPCS

## 2018-01-19 PROCEDURE — 97165 OT EVAL LOW COMPLEX 30 MIN: CPT

## 2018-01-19 PROCEDURE — 6370000000 HC RX 637 (ALT 250 FOR IP): Performed by: INTERNAL MEDICINE

## 2018-01-19 PROCEDURE — 80048 BASIC METABOLIC PNL TOTAL CA: CPT

## 2018-01-19 PROCEDURE — 6370000000 HC RX 637 (ALT 250 FOR IP): Performed by: ORTHOPAEDIC SURGERY

## 2018-01-19 PROCEDURE — 81001 URINALYSIS AUTO W/SCOPE: CPT

## 2018-01-19 PROCEDURE — 85025 COMPLETE CBC W/AUTO DIFF WBC: CPT

## 2018-01-19 PROCEDURE — G8988 SELF CARE GOAL STATUS: HCPCS

## 2018-01-19 PROCEDURE — 1210000000 HC MED SURG R&B

## 2018-01-19 PROCEDURE — 2580000003 HC RX 258: Performed by: INTERNAL MEDICINE

## 2018-01-19 PROCEDURE — G8979 MOBILITY GOAL STATUS: HCPCS

## 2018-01-19 PROCEDURE — 97161 PT EVAL LOW COMPLEX 20 MIN: CPT

## 2018-01-19 PROCEDURE — 6360000002 HC RX W HCPCS: Performed by: ORTHOPAEDIC SURGERY

## 2018-01-19 RX ORDER — SODIUM CHLORIDE 9 MG/ML
INJECTION, SOLUTION INTRAVENOUS CONTINUOUS
Status: DISCONTINUED | OUTPATIENT
Start: 2018-01-19 | End: 2018-01-19

## 2018-01-19 RX ORDER — SODIUM CHLORIDE 9 MG/ML
INJECTION, SOLUTION INTRAVENOUS CONTINUOUS
Status: DISCONTINUED | OUTPATIENT
Start: 2018-01-19 | End: 2018-01-21 | Stop reason: HOSPADM

## 2018-01-19 RX ORDER — OXYCODONE HYDROCHLORIDE AND ACETAMINOPHEN 5; 325 MG/1; MG/1
1 TABLET ORAL EVERY 4 HOURS PRN
Status: DISCONTINUED | OUTPATIENT
Start: 2018-01-19 | End: 2018-01-21 | Stop reason: HOSPADM

## 2018-01-19 RX ORDER — 0.9 % SODIUM CHLORIDE 0.9 %
500 INTRAVENOUS SOLUTION INTRAVENOUS PRN
Status: DISCONTINUED | OUTPATIENT
Start: 2018-01-19 | End: 2018-01-19

## 2018-01-19 RX ORDER — ONDANSETRON 2 MG/ML
4 INJECTION INTRAMUSCULAR; INTRAVENOUS EVERY 6 HOURS PRN
Status: DISCONTINUED | OUTPATIENT
Start: 2018-01-19 | End: 2018-01-21 | Stop reason: HOSPADM

## 2018-01-19 RX ORDER — ONDANSETRON 2 MG/ML
4 INJECTION INTRAMUSCULAR; INTRAVENOUS EVERY 6 HOURS PRN
Status: DISCONTINUED | OUTPATIENT
Start: 2018-01-19 | End: 2018-01-19

## 2018-01-19 RX ORDER — CARVEDILOL 25 MG/1
25 TABLET ORAL 2 TIMES DAILY WITH MEALS
Status: DISCONTINUED | OUTPATIENT
Start: 2018-01-19 | End: 2018-01-21 | Stop reason: HOSPADM

## 2018-01-19 RX ORDER — SODIUM CHLORIDE 0.9 % (FLUSH) 0.9 %
10 SYRINGE (ML) INJECTION EVERY 12 HOURS SCHEDULED
Status: DISCONTINUED | OUTPATIENT
Start: 2018-01-19 | End: 2018-01-21 | Stop reason: HOSPADM

## 2018-01-19 RX ORDER — SODIUM CHLORIDE 0.9 % (FLUSH) 0.9 %
10 SYRINGE (ML) INJECTION PRN
Status: DISCONTINUED | OUTPATIENT
Start: 2018-01-19 | End: 2018-01-19

## 2018-01-19 RX ORDER — NIFEDIPINE 90 MG/1
90 TABLET, EXTENDED RELEASE ORAL DAILY
Status: DISCONTINUED | OUTPATIENT
Start: 2018-01-19 | End: 2018-01-20

## 2018-01-19 RX ORDER — SODIUM CHLORIDE 0.9 % (FLUSH) 0.9 %
10 SYRINGE (ML) INJECTION PRN
Status: DISCONTINUED | OUTPATIENT
Start: 2018-01-19 | End: 2018-01-21 | Stop reason: HOSPADM

## 2018-01-19 RX ORDER — ACETAMINOPHEN 325 MG/1
650 TABLET ORAL EVERY 4 HOURS PRN
Status: DISCONTINUED | OUTPATIENT
Start: 2018-01-19 | End: 2018-01-19

## 2018-01-19 RX ORDER — ACETAMINOPHEN 325 MG/1
650 TABLET ORAL EVERY 4 HOURS PRN
Status: DISCONTINUED | OUTPATIENT
Start: 2018-01-19 | End: 2018-01-21 | Stop reason: HOSPADM

## 2018-01-19 RX ORDER — MORPHINE SULFATE 4 MG/ML
2 INJECTION, SOLUTION INTRAMUSCULAR; INTRAVENOUS
Status: DISCONTINUED | OUTPATIENT
Start: 2018-01-19 | End: 2018-01-21 | Stop reason: HOSPADM

## 2018-01-19 RX ORDER — MORPHINE SULFATE 4 MG/ML
4 INJECTION, SOLUTION INTRAMUSCULAR; INTRAVENOUS
Status: DISCONTINUED | OUTPATIENT
Start: 2018-01-19 | End: 2018-01-21 | Stop reason: HOSPADM

## 2018-01-19 RX ORDER — DOCUSATE SODIUM 100 MG/1
100 CAPSULE, LIQUID FILLED ORAL 2 TIMES DAILY
Status: DISCONTINUED | OUTPATIENT
Start: 2018-01-19 | End: 2018-01-21 | Stop reason: HOSPADM

## 2018-01-19 RX ORDER — ASPIRIN 81 MG/1
81 TABLET ORAL 2 TIMES DAILY
Status: DISCONTINUED | OUTPATIENT
Start: 2018-01-19 | End: 2018-01-21 | Stop reason: HOSPADM

## 2018-01-19 RX ORDER — OXYCODONE HYDROCHLORIDE AND ACETAMINOPHEN 5; 325 MG/1; MG/1
2 TABLET ORAL EVERY 4 HOURS PRN
Status: DISCONTINUED | OUTPATIENT
Start: 2018-01-19 | End: 2018-01-21 | Stop reason: HOSPADM

## 2018-01-19 RX ORDER — SODIUM CHLORIDE 0.9 % (FLUSH) 0.9 %
10 SYRINGE (ML) INJECTION EVERY 12 HOURS SCHEDULED
Status: DISCONTINUED | OUTPATIENT
Start: 2018-01-19 | End: 2018-01-19

## 2018-01-19 RX ADMIN — CARVEDILOL 25 MG: 25 TABLET, FILM COATED ORAL at 16:09

## 2018-01-19 RX ADMIN — NIFEDIPINE 90 MG: 90 TABLET, FILM COATED, EXTENDED RELEASE ORAL at 09:36

## 2018-01-19 RX ADMIN — CEFAZOLIN SODIUM 2 G: 2 SOLUTION INTRAVENOUS at 09:36

## 2018-01-19 RX ADMIN — CARVEDILOL 25 MG: 25 TABLET, FILM COATED ORAL at 09:36

## 2018-01-19 RX ADMIN — DOCUSATE SODIUM 100 MG: 100 CAPSULE, LIQUID FILLED ORAL at 09:36

## 2018-01-19 RX ADMIN — ASPIRIN 81 MG: 81 TABLET, COATED ORAL at 09:36

## 2018-01-19 RX ADMIN — ASPIRIN 81 MG: 81 TABLET, COATED ORAL at 20:43

## 2018-01-19 RX ADMIN — Medication 10 ML: at 20:43

## 2018-01-19 RX ADMIN — DOCUSATE SODIUM 100 MG: 100 CAPSULE, LIQUID FILLED ORAL at 20:43

## 2018-01-19 ASSESSMENT — ENCOUNTER SYMPTOMS
DOUBLE VISION: 0
SPUTUM PRODUCTION: 0
ABDOMINAL PAIN: 0
SORE THROAT: 0
COUGH: 0
BLURRED VISION: 0
NAUSEA: 0
DIARRHEA: 0
SHORTNESS OF BREATH: 0
HEARTBURN: 0
VOMITING: 0

## 2018-01-19 ASSESSMENT — PAIN DESCRIPTION - LOCATION: LOCATION: HIP

## 2018-01-19 ASSESSMENT — PAIN SCALES - WONG BAKER
WONGBAKER_NUMERICALRESPONSE: 2
WONGBAKER_NUMERICALRESPONSE: 2

## 2018-01-19 ASSESSMENT — PAIN DESCRIPTION - ORIENTATION: ORIENTATION: RIGHT

## 2018-01-19 ASSESSMENT — PAIN DESCRIPTION - PAIN TYPE: TYPE: SURGICAL PAIN

## 2018-01-19 NOTE — OP NOTE
BIPOLAR HIP ARTHROPLASTY OPERATIVE NOTE    NAME OF SURGEON / : Estrellita Dumont MD  PATIENT:   Jonas Rivera  Date: 1/18/2018        Time: 7:14 PM   Referring Physician: ________________________    PREOP DIAGNOSIS:  right displaced base cervical and head/neck femoral neck fracture   POSTOP DIAGNOSIS:  Same     PROCEDURE:    Right  cemented Bipolar hip arthroplasty (19394)     IMPLANTS:   Implant Name Type Inv. Item Serial No.  Lot No. LRB No. Used   PALACOS LV BONE CEMENT     [id number] Right 1   PALACOS R BONE CEMENT Cement CEMENT PALACOS R SING DOSE 40GR  [employer] [id number] Right 1   IMPL HIP STEM SZ 13 STD Hip IMPL HIP STEM SZ 13 STD  PAX[employer]ON RECONSTRUCTION [id number] Right 1   IMPL HIP HEAD FEM SZ 28MM Hip IMPL HIP HEAD FEM SZ 28MM  [employer]ON RECONSTRUCTION [id number] Right 1   PAXEON BIPOLAR HEAD         [id number] Right 1       FINDINGS: press fit stem was not stable so cemented the stem  ASSISTANT: EZIO Vides helped with the entire procedure. He helped to position the patient, retract soft tissues, and performed some of the steps of the procedure. He assisted with closure of the capsule, subcutaneous tissue, and the skin. ANESTHESIA:  General  EBL:  500 mL  FLUIDS: See anesthesia record  BLOOD PRODUCTS:  None  COMPLICATIONS:  None  SPECIMEN:  None        INDICATIONS:  Patient presents for the above procedure having failed conservative treatment. Patient consents to the procedure above understanding the risks of bleeding, infection, anesthesia, nerve injury, stiffness, and blood clots. Procedure in Detail:    The patient was brought into the operating room, general anesthesia given, and transferred to the [place] table. The operative extremity was placed in light traction across a padded perineal post.  An antibiotic was given IV. Tranexamic acid   1 gram was given IV. The extremity was prepped with chlorhexidine and alcohol and draped sterilely. Ioband barriers were used.     An anterior approach was made to the hip. Careful dissection was carried down to fascia which was longitudinally incised. The tensor muscle was elevated off the medial fascia and a cobra retractor placed around the lateral femoral neck. Jose retractors were used distally between the Sartorious and tensor to expose the vastus aponeurosis. This was released with the bovie to expose the lateral circumflex vessels. These were coagulated and divided with the bovie. A serrato elevator was used to lift the rectus off the capsule and a cobra retractor placed around the medial femoral neck. A bent jennifer retractor was placed over the superior acetabulum. Traction was applied and the capsule was incised by beginning at the superior acetabulum and extending distally to the intertrochanteric line and then dividing medially and laterally. The capsular flaps were marked with 0 vicryl sutures. The cobra retractors were placed deep to capsule around the neck. A displaced femoral neck fracture was identified. The neck was cut with the saw beginning laterally at the neck-trochanter junction and completed medially 5 mm proximal to the intertrochanteric line. A second saw cut was made parallel to the first 10 mm proximal to the first cut. The ring of neck bone was removed with a kocher clamp and the head removed with a corkscrew. Traction was released and the leg was externally rotated 120 degrees. A jennifer retractor was placed over the lesser trochanter and the medial hip capsule was released of the inferior femoral neck with a bovie and serrato elevator. Gentle traction was applied and cobra retractors were placed around the acetabulum. The socket was normal.  The head diameter was carefully measured. The lift hook for the HANA bed was placed around the proximal femur and the leg was externally rotated 120 degrees and the hip was extended and adducted.   Bent jennifer retractors were placed medial to the femoral neck

## 2018-01-19 NOTE — PROGRESS NOTES
Occupational Therapy   Occupational Therapy Initial Assessment  Date: 2018   Patient Name: Meghan Rocha  MRN: [id number]     : 1926    Patient Diagnosis(es): The encounter diagnosis was Closed fracture of right hip, initial encounter [facility]). has a past medical history of Closed right hip fracture, initial encounter ([facility] [address] Hyperlipidemia; and Hypertension. has a past surgical history that includes Thyroidectomy and reconstruc hip socket (Right, 2018). Treatment Diagnosis: R THR (anterior)      Restrictions  Restrictions/Precautions  Restrictions/Precautions: ROM Restrictions (For 1 month, no bending over past mid shin.)  Lower Extremity Weight Bearing Restrictions  Right Lower Extremity Weight Bearing: Weight Bearing As Tolerated  Position Activity Restriction  Other position/activity restrictions: ANTERIOR HIP PRECAUTIONS    Subjective   General  Chart Reviewed: Yes  Patient assessed for rehabilitation services?: Yes  Additional Pertinent Hx: Pt. fell at home, resulting in hip fx. Family / Caregiver Present: Yes  Diagnosis: R THR  General Comment  Comments: Pt. ready to get up out of bed. Pain Assessment  Patient Currently in Pain: Yes  Pain Assessment: Faces  Brandt-Baker Pain Rating: Hurts a little bit  Pain Level: 0  Pain Type: Surgical pain  Pain Location: Hip  Pain Orientation: Right  Oxygen Therapy  SpO2: (!) 89 % (with activity)  O2 Device: None (Room air)  Patient Observation  Observations: Placed back on 2L at rest- sats >90%  Social/Functional History  Social/Functional History  Lives With: Family  Home Layout: One level  ADL Assistance: Independent  Ambulation Assistance: Independent  Transfer Assistance: Independent  Additional Comments: FAMILY ALWAYS PRESENT. PRIOR TO  Pt WAS COMPLETELY INDEPENDENT. HAS BEEN SLIGHTLY WEAKER SINCE REQUIRING SUPERVISION FOR MOBILITY.        Objective   Vision: Within Functional Limits  Hearing: Within functional limits with seated LB dsg  Long term goals  Long term goal 1: Return to PLOF       Therapy Time   Individual Concurrent Group Co-treatment   Time In           Time Out           Minutes                   Shelia Harrell OTR/L

## 2018-01-19 NOTE — CARE COORDINATION
Randa Healy RN  P# [phone]    Patient would like dme item to be delivered to room #534. Please call if you have any questions. Patient Information   Patient Name  Otis Sheffield ([id number]) Sex  Female   1926   Room Bed   0534 [id number]   Patient Ethnicity & Race   Ethnic Group Patient Race   Non-/Non  [place]   Patient Demographics   Address  [place] [address] Phone  [phone] [facility])  [phone] [facility] E-mail Address  [email or web address]   PCP and Center   Primary Care Provider [facility] [phone] [address] on File      Status Date Received Description   Documents for the Patient   Photo ID Received () 16    Insurance Card Received () 16 MEDICARE   Power of  Not Received     Financial Assistance Program Applications Not Received     Cardiac Rehab Phase 3 Payment Not Received     Recurring Hospital Consent/HIPAA Scanned Not Received     Advance Directive Assessment Received     Drivers License  85/40/83    Insurance Card Received 17 MEDICARE   Advance Directive Assessment  16    Advance Directives and Living Will Not Received     Photo ID Received 17 EXP 4/10/18   Photo ID Received 17 EXP 4/10/18   HIM JC Authorization  18    Advance Directive Assessment Received 18    (OLD) [facility] ([facility]) Physician Consent and NPP Not Received     Physician Office Consent for Treatment Not Received     Financial Responsibility Form Not Received     [facility] Not Received     [facility] Child Pro[facility] Access Form Not Received     ACO Consent for the Release of  Confidential Alcohol or Drug Treatment Information Not Received     ACO Declining to [address] [facility] Not Received     [address] Physician Communication Release NPP Not Received     [facility] ([facility]) Physician Consent and NPP Not Received     Documents for the

## 2018-01-19 NOTE — PROGRESS NOTES
RLE  Comment: GROSSY +3/5  Strength LLE  Comment: GROSSLY +4/5        Bed mobility  Supine to Sit: Minimal assistance; Moderate assistance  Transfers  Sit to Stand: Minimal Assistance  Bed to Chair: Minimal assistance (+1 FOR SAFETY)  Ambulation  WB Status: FWB  Ambulation 1  Device: Rolling Walker  Assistance: Minimal assistance  Quality of Gait: ABLE TO BEAR SUFFICIENT WEIGHT RLE, NO LOB  Distance: 4'  Comments: VC'S FOR DIRECTION/SAFETY     Balance  Sitting - Dynamic: Good  Standing - Dynamic: Fair;+        Assessment   Body structures, Functions, Activity limitations: Decreased functional mobility ; Decreased balance;Decreased ROM; Decreased strength;Decreased safe awareness  Assessment: STOOD WITHOUT DIFFICULTY AND STEPPED TO RECLINER. WILL PROGRESS WITH GT AS ABLE. REQUIRES PT FOLLOW UP: Yes  Activity Tolerance  Activity Tolerance: Patient Tolerated treatment well     Discharge Recommendations:  Continue to assess pending progress      Plan   Plan  Times per week: AT LEAST 7  Current Treatment Recommendations: Strengthening, ROM, Balance Training, Functional Mobility Training, Transfer Training, Gait Training, Patient/Caregiver Education & Training, Safety Education & Training  Safety Devices  Type of devices: Left in chair, Call light within reach    G-Code  PT G-Codes  Functional Assessment Tool Used: BED TO CHAIR  Functional Limitation: Mobility: Walking and moving around  Mobility: Walking and Moving Around Current Status (): At least 40 percent but less than 60 percent impaired, limited or restricted  Mobility: Walking and Moving Around Goal Status ():  At least 1 percent but less than 20 percent impaired, limited or restricted  OutComes Score                                           AM-PAC Score             Goals  Short term goals  Time Frame for Short term goals: 14 DAYS  Short term goal 1: BED MOB SUPERVISION  Short term goal 2: TRANSFERS SUPERVISION  Short term goal 3: ' RW SUPERVISION       Therapy Time   Individual Concurrent Group Co-treatment   Time In           Time Out           Minutes                   Paz Woodward, PT

## 2018-01-19 NOTE — PROGRESS NOTES
sounds normal. No stridor, respiratory distress, wheezes or rales noted. Abdominal: Soft. Bowel sounds present in all 4 quads with No distension, mass, tenderness, guarding or rebound tenderness noted. Musculoskeletal: decreased ROM to RLE with mild tenderness and edema    Neurological: Alert and oriented to person, place, and time. No cranial nerve deficit. Skin: Skin is warm and dry. No rash, diaphoresis, erythema, or pallor noted.      MEDICATIONS:   sodium chloride      lactated ringers        carvedilol  25 mg Oral BID WC    NIFEdipine  90 mg Oral Daily    sodium chloride flush  10 mL Intravenous 2 times per day    sodium chloride flush  10 mL Intravenous 2 times per day    ceFAZolin (ANCEF) IVPB  2 g Intravenous Q8H    docusate sodium  100 mg Oral BID    aspirin  81 mg Oral BID    sodium chloride flush  10 mL Intravenous 2 times per day     sodium chloride flush, acetaminophen, magnesium hydroxide, ondansetron, sodium chloride, sodium chloride flush, acetaminophen, oxyCODONE-acetaminophen **OR** oxyCODONE-acetaminophen, morphine **OR** morphine, ondansetron, sodium chloride flush    DIET: DIET GENERAL;  Dietary Nutrition Supplements: Low Calorie High Protein Supplement    IV Access: PIV    Luna Catheter: present    GI Prophylaxis: none    DVT Prophylaxis: SCD's while in bed and baby asa    DIAGNOSTIC DATA:  Recent Labs      01/18/18   1625  01/19/18   0342   WBC  10.9*  7.9   RBC  2.98*  2.43*   HGB  9.7*  8.1*   HCT  30.4*  25.7*   MCV  102.0*  105.8*   MCH  32.6*  33.3*   MCHC  31.9*  31.5*   PLT  221  173     Recent Labs      01/18/18   1625  01/19/18   0342   NA  131*  136   K  4.3   --    ANIONGAP  12  13   CL  94*  101   CO2  25  22   BUN  19  22   CREATININE  0.8  1.0*   GLUCOSE  128*  141*   CALCIUM  8.8  7.9*     Recent Labs      01/18/18   1625   AST  22   ALT  14   BILITOT  1.0   ALKPHOS  62     Troponin T:   Recent Labs      01/18/18   1625   TROPONINI  <0.01     INR:   Recent Labs

## 2018-01-20 PROBLEM — D62 ACUTE BLOOD LOSS AS CAUSE OF POSTOPERATIVE ANEMIA: Status: ACTIVE | Noted: 2018-01-20

## 2018-01-20 PROBLEM — N30.00 ACUTE CYSTITIS WITHOUT HEMATURIA: Status: ACTIVE | Noted: 2018-01-20

## 2018-01-20 LAB
ANION GAP SERPL CALCULATED.3IONS-SCNC: 12 MMOL/L (ref 7–19)
BLOOD BANK DISPENSE STATUS: NORMAL
BLOOD BANK PRODUCT CODE: NORMAL
BPU ID: NORMAL
BUN BLDV-MCNC: 26 MG/DL (ref 8–23)
CALCIUM SERPL-MCNC: 7.6 MG/DL (ref 8.2–9.6)
CHLORIDE BLD-SCNC: 104 MMOL/L (ref 98–111)
CO2: 21 MMOL/L (ref 22–29)
CREAT SERPL-MCNC: 0.8 MG/DL (ref 0.5–0.9)
DESCRIPTION BLOOD BANK: NORMAL
FERRITIN: 847.2 NG/ML (ref 13–150)
FOLATE: 13.3 NG/ML (ref 4.8–37.3)
GFR NON-AFRICAN AMERICAN: >60
GLUCOSE BLD-MCNC: 102 MG/DL (ref 74–109)
HCT VFR BLD CALC: 22.5 % (ref 37–47)
HCT VFR BLD CALC: 25.5 % (ref 37–47)
HEMOGLOBIN: 7 G/DL (ref 12–16)
HEMOGLOBIN: 8.2 G/DL (ref 12–16)
IRON SATURATION: 18 % (ref 14–50)
IRON: 27 UG/DL (ref 37–145)
MCH RBC QN AUTO: 32.4 PG (ref 27–31)
MCH RBC QN AUTO: 32.9 PG (ref 27–31)
MCHC RBC AUTO-ENTMCNC: 31.1 G/DL (ref 33–37)
MCHC RBC AUTO-ENTMCNC: 32.2 G/DL (ref 33–37)
MCV RBC AUTO: 100.8 FL (ref 81–99)
MCV RBC AUTO: 105.6 FL (ref 81–99)
PDW BLD-RTO: 14.9 % (ref 11.5–14.5)
PDW BLD-RTO: 16.5 % (ref 11.5–14.5)
PLATELET # BLD: 153 K/UL (ref 130–400)
PLATELET # BLD: 158 K/UL (ref 130–400)
PMV BLD AUTO: 11 FL (ref 9.4–12.3)
PMV BLD AUTO: 11.4 FL (ref 9.4–12.3)
POTASSIUM REFLEX MAGNESIUM: 3.7 MMOL/L (ref 3.5–5)
RBC # BLD: 2.13 M/UL (ref 4.2–5.4)
RBC # BLD: 2.53 M/UL (ref 4.2–5.4)
RETICULOCYTE ABSOLUTE COUNT: 0.06 M/UL (ref 0.03–0.12)
RETICULOCYTE COUNT PCT: 2.92 % (ref 0.5–1.5)
SODIUM BLD-SCNC: 137 MMOL/L (ref 136–145)
TOTAL IRON BINDING CAPACITY: 153 UG/DL (ref 250–400)
VITAMIN B-12: 1030 PG/ML (ref 211–946)
WBC # BLD: 8.9 K/UL (ref 4.8–10.8)
WBC # BLD: 9.1 K/UL (ref 4.8–10.8)

## 2018-01-20 PROCEDURE — 85027 COMPLETE CBC AUTOMATED: CPT

## 2018-01-20 PROCEDURE — 82746 ASSAY OF FOLIC ACID SERUM: CPT

## 2018-01-20 PROCEDURE — 2580000003 HC RX 258: Performed by: NURSE PRACTITIONER

## 2018-01-20 PROCEDURE — 83550 IRON BINDING TEST: CPT

## 2018-01-20 PROCEDURE — 6370000000 HC RX 637 (ALT 250 FOR IP): Performed by: ORTHOPAEDIC SURGERY

## 2018-01-20 PROCEDURE — 6370000000 HC RX 637 (ALT 250 FOR IP): Performed by: INTERNAL MEDICINE

## 2018-01-20 PROCEDURE — 97116 GAIT TRAINING THERAPY: CPT

## 2018-01-20 PROCEDURE — 80048 BASIC METABOLIC PNL TOTAL CA: CPT

## 2018-01-20 PROCEDURE — 82607 VITAMIN B-12: CPT

## 2018-01-20 PROCEDURE — 6360000002 HC RX W HCPCS: Performed by: NURSE PRACTITIONER

## 2018-01-20 PROCEDURE — 2700000000 HC OXYGEN THERAPY PER DAY

## 2018-01-20 PROCEDURE — 99232 SBSQ HOSP IP/OBS MODERATE 35: CPT | Performed by: NURSE PRACTITIONER

## 2018-01-20 PROCEDURE — 83540 ASSAY OF IRON: CPT

## 2018-01-20 PROCEDURE — 82728 ASSAY OF FERRITIN: CPT

## 2018-01-20 PROCEDURE — 6370000000 HC RX 637 (ALT 250 FOR IP): Performed by: NURSE PRACTITIONER

## 2018-01-20 PROCEDURE — 1210000000 HC MED SURG R&B

## 2018-01-20 PROCEDURE — 36415 COLL VENOUS BLD VENIPUNCTURE: CPT

## 2018-01-20 PROCEDURE — 36430 TRANSFUSION BLD/BLD COMPNT: CPT

## 2018-01-20 PROCEDURE — P9016 RBC LEUKOCYTES REDUCED: HCPCS

## 2018-01-20 PROCEDURE — 85045 AUTOMATED RETICULOCYTE COUNT: CPT

## 2018-01-20 RX ORDER — BISACODYL 10 MG
10 SUPPOSITORY, RECTAL RECTAL DAILY
Status: DISCONTINUED | OUTPATIENT
Start: 2018-01-20 | End: 2018-01-21 | Stop reason: HOSPADM

## 2018-01-20 RX ORDER — 0.9 % SODIUM CHLORIDE 0.9 %
250 INTRAVENOUS SOLUTION INTRAVENOUS ONCE
Status: COMPLETED | OUTPATIENT
Start: 2018-01-20 | End: 2018-01-20

## 2018-01-20 RX ORDER — POLYETHYLENE GLYCOL 3350 17 G/17G
17 POWDER, FOR SOLUTION ORAL 2 TIMES DAILY
Status: DISCONTINUED | OUTPATIENT
Start: 2018-01-20 | End: 2018-01-21 | Stop reason: HOSPADM

## 2018-01-20 RX ORDER — NIFEDIPINE 90 MG/1
90 TABLET, EXTENDED RELEASE ORAL DAILY
Status: DISCONTINUED | OUTPATIENT
Start: 2018-01-21 | End: 2018-01-21 | Stop reason: HOSPADM

## 2018-01-20 RX ORDER — FERROUS SULFATE 325(65) MG
325 TABLET ORAL 2 TIMES DAILY WITH MEALS
Status: DISCONTINUED | OUTPATIENT
Start: 2018-01-20 | End: 2018-01-21 | Stop reason: HOSPADM

## 2018-01-20 RX ADMIN — ASPIRIN 81 MG: 81 TABLET, COATED ORAL at 08:15

## 2018-01-20 RX ADMIN — DOCUSATE SODIUM 100 MG: 100 CAPSULE, LIQUID FILLED ORAL at 08:15

## 2018-01-20 RX ADMIN — FERROUS SULFATE TAB 325 MG (65 MG ELEMENTAL FE) 325 MG: 325 (65 FE) TAB at 08:54

## 2018-01-20 RX ADMIN — SODIUM CHLORIDE 250 ML: 9 INJECTION, SOLUTION INTRAVENOUS at 08:56

## 2018-01-20 RX ADMIN — ASPIRIN 81 MG: 81 TABLET, COATED ORAL at 22:04

## 2018-01-20 RX ADMIN — SODIUM CHLORIDE: 9 INJECTION, SOLUTION INTRAVENOUS at 17:20

## 2018-01-20 RX ADMIN — CARVEDILOL 25 MG: 25 TABLET, FILM COATED ORAL at 17:20

## 2018-01-20 RX ADMIN — MAGNESIUM HYDROXIDE 30 ML: 400 SUSPENSION ORAL at 08:14

## 2018-01-20 RX ADMIN — FERROUS SULFATE TAB 325 MG (65 MG ELEMENTAL FE) 325 MG: 325 (65 FE) TAB at 17:20

## 2018-01-20 RX ADMIN — POLYETHYLENE GLYCOL 3350 17 G: 17 POWDER, FOR SOLUTION ORAL at 08:54

## 2018-01-20 RX ADMIN — Medication 1 G: at 08:15

## 2018-01-20 NOTE — PROGRESS NOTES
ordered    PHYSICAL EXAM:  Constitutional:  Well-developed and well-nourished. Appears stated age. No distress. HENT:    Head: Normocephalic and atraumatic.    Mouth/Throat: Oropharynx is clear and moist. No oropharyngeal exudate. Eyes: Conjunctivae normal and EOMI. PERRAL. No scleral icterus. Neck: Normal ROM. Neck supple. No JVD, tracheal deviation, or thyromegaly present. Cardiovascular: Normal rate, rhythm, and heart sounds with systolic murmur heard. No gallop or friction rub murmur noted. Pulmonary/Chest: Effort & breath sounds normal. No stridor, respiratory distress, wheezes or rales noted. Abdominal: Soft. Bowel sounds present in all 4 quads with No distension, mass, tenderness, guarding or rebound tenderness noted. Musculoskeletal: decreased ROM to RLE with mild tenderness and edema    Neurological: Alert and oriented to person, place, and time. No cranial nerve deficit. Skin: Skin is warm and dry. No rash, diaphoresis, erythema, or pallor noted.      MEDICATIONS:   sodium chloride        carvedilol  25 mg Oral BID WC    NIFEdipine  90 mg Oral Daily    sodium chloride flush  10 mL Intravenous 2 times per day    docusate sodium  100 mg Oral BID    aspirin  81 mg Oral BID    cefTRIAXone (ROCEPHIN) IV  1 g Intravenous Q24H     sodium chloride flush, acetaminophen, magnesium hydroxide, ondansetron, oxyCODONE-acetaminophen **OR** oxyCODONE-acetaminophen, morphine **OR** morphine    DIET: DIET GENERAL;  Dietary Nutrition Supplements: Low Calorie High Protein Supplement    IV Access: PIV    Luna Catheter: present    GI Prophylaxis: none    DVT Prophylaxis: SCD's while in bed and baby asa    DIAGNOSTIC DATA:  Recent Labs      01/18/18   1625  01/19/18   0342  01/20/18   0324   WBC  10.9*  7.9  9.1   RBC  2.98*  2.43*  2.13*   HGB  9.7*  8.1*  7.0*   HCT  30.4*  25.7*  22.5*   MCV  102.0*  105.8*  105.6*   MCH  32.6*  33.3*  32.9*   MCHC  31.9*  31.5*  31.1*   PLT  221  173  158     Recent Labs 01/18/18   1625  01/19/18   0342  01/20/18   0324   NA  131*  136  137   K  4.3   --    --    ANIONGAP  12  13  12   CL  94*  101  104   CO2  25  22  21*   BUN  19  22  26*   CREATININE  0.8  1.0*  0.8   GLUCOSE  128*  141*  102   CALCIUM  8.8  7.9*  7.6*     Recent Labs      01/18/18   1625   AST  22   ALT  14   BILITOT  1.0   ALKPHOS  62     Troponin T:   Recent Labs      01/18/18   1625   TROPONINI  <0.01     INR:   Recent Labs      01/18/18   2057   INR  1.02     UA:  Recent Labs      01/19/18   0603   COLORU  YELLOW   PHUR  6.0   WBCUA  95*   RBCUA  3   BACTERIA  4+   CLARITYU  CLOUDY*   SPECGRAV  1.013   LEUKOCYTESUR  MODERATE*   UROBILINOGEN  0.2   BILIRUBINUR  Negative   BLOODU  Negative   GLUCOSEU  Negative     CXR 1/18/18  1. No acute appearing infiltrate or effusion. 2. Biapical scarring. Stable bronchial wall thickening. 3. Probable hiatal hernia. Signed by Dr Amira Harris on 1/18/2018 4:02 PM     XR Hip 1/18/18  A moderately impacted subcapital transcervical fracture of  the right proximal femur. Moderate diffuse osteopenia. Signed by Dr Jenniffer Newberry on 1/18/2018 4:02 PM    ASSESSMENT/PLAN:  Principal Problem:  Closed right hip fracture, initial encounter Surgical repair on 1/18/19 by Dr. Herminio Pérez, Right cemented Bipolar hip arthroplasty   Active Problems:  Hypertension - monitor BP, has been hypotensive, perimeters on BP medications  Hyperlipidemia - noted  Acute Blood Loss Anemia 2' Post OP - anemia panel in am, Monitor CBC, Keep hgb >7, Transfuse as needed Anemia panel complete does not need IV Venofer but Iron is low. Transfuse 1 unit RBC's  Acute cystitis without hematuria - Clean specimen, Start Rocephin pending cultures,     IV antibiotics: Post Op Ancef followed by Rocephin for UTI    Continue with current plan of care, see orders and further recommendations as clinically indicated. DC PLAN: Home. Patient does not want Home Health, SNF or rehab.  Wheelchair, Ottchasidy Gonzaleze, shower chair

## 2018-01-20 NOTE — PROGRESS NOTES
Physical Therapy  Cheryl Ordonez  [id number]     01/20/18 1445   Subjective   Subjective Patient up in chair and states she is comfortable. Patient not wanting to return to bed at this time. Family present.    Electronically signed by Lanna Lennox, PTA on 1/20/2018 at 2:48 PM

## 2018-01-20 NOTE — PROGRESS NOTES
Luna catheter removed per order. Patient tolerated well without complaints. Patient due to void. Will continue to monitor.

## 2018-01-21 ENCOUNTER — HOSPITAL ENCOUNTER (EMERGENCY)
Age: 83
Discharge: HOME | End: 2018-01-22
Attending: EMERGENCY MEDICINE
Payer: MEDICARE

## 2018-01-21 VITALS
OXYGEN SATURATION: 91 % | TEMPERATURE: 97.2 F | SYSTOLIC BLOOD PRESSURE: 97 MMHG | HEART RATE: 63 BPM | RESPIRATION RATE: 18 BRPM | DIASTOLIC BLOOD PRESSURE: 58 MMHG | BODY MASS INDEX: 23.56 KG/M2 | WEIGHT: 120 LBS | HEIGHT: 60 IN

## 2018-01-21 DIAGNOSIS — L76.32 POSTOPERATIVE HEMATOMA OF SKIN FOLLOWING NON-DERMATOLOGIC PROCEDURE: Primary | ICD-10-CM

## 2018-01-21 LAB
ANION GAP SERPL CALCULATED.3IONS-SCNC: 11 MMOL/L (ref 7–19)
BUN BLDV-MCNC: 18 MG/DL (ref 8–23)
CALCIUM SERPL-MCNC: 7.4 MG/DL (ref 8.2–9.6)
CHLORIDE BLD-SCNC: 105 MMOL/L (ref 98–111)
CO2: 22 MMOL/L (ref 22–29)
CREAT SERPL-MCNC: 0.6 MG/DL (ref 0.5–0.9)
GFR NON-AFRICAN AMERICAN: >60
GLUCOSE BLD-MCNC: 96 MG/DL (ref 74–109)
HCT VFR BLD CALC: 25 % (ref 37–47)
HEMOGLOBIN: 8 G/DL (ref 12–16)
MCH RBC QN AUTO: 32 PG (ref 27–31)
MCHC RBC AUTO-ENTMCNC: 32 G/DL (ref 33–37)
MCV RBC AUTO: 100 FL (ref 81–99)
PDW BLD-RTO: 16.7 % (ref 11.5–14.5)
PLATELET # BLD: 135 K/UL (ref 130–400)
PMV BLD AUTO: 11.2 FL (ref 9.4–12.3)
POTASSIUM REFLEX MAGNESIUM: 3.9 MMOL/L (ref 3.5–5)
RBC # BLD: 2.5 M/UL (ref 4.2–5.4)
SODIUM BLD-SCNC: 138 MMOL/L (ref 136–145)
WBC # BLD: 6.2 K/UL (ref 4.8–10.8)

## 2018-01-21 PROCEDURE — 51798 US URINE CAPACITY MEASURE: CPT

## 2018-01-21 PROCEDURE — 2700000000 HC OXYGEN THERAPY PER DAY

## 2018-01-21 PROCEDURE — 99282 EMERGENCY DEPT VISIT SF MDM: CPT

## 2018-01-21 PROCEDURE — 2580000003 HC RX 258: Performed by: NURSE PRACTITIONER

## 2018-01-21 PROCEDURE — 80048 BASIC METABOLIC PNL TOTAL CA: CPT

## 2018-01-21 PROCEDURE — 6370000000 HC RX 637 (ALT 250 FOR IP): Performed by: NURSE PRACTITIONER

## 2018-01-21 PROCEDURE — 99238 HOSP IP/OBS DSCHRG MGMT 30/<: CPT | Performed by: NURSE PRACTITIONER

## 2018-01-21 PROCEDURE — 6370000000 HC RX 637 (ALT 250 FOR IP): Performed by: ORTHOPAEDIC SURGERY

## 2018-01-21 PROCEDURE — 85027 COMPLETE CBC AUTOMATED: CPT

## 2018-01-21 PROCEDURE — 6370000000 HC RX 637 (ALT 250 FOR IP): Performed by: INTERNAL MEDICINE

## 2018-01-21 PROCEDURE — 2580000003 HC RX 258: Performed by: INTERNAL MEDICINE

## 2018-01-21 PROCEDURE — 36415 COLL VENOUS BLD VENIPUNCTURE: CPT

## 2018-01-21 PROCEDURE — 6360000002 HC RX W HCPCS: Performed by: NURSE PRACTITIONER

## 2018-01-21 RX ORDER — ACETAMINOPHEN 325 MG/1
650 TABLET ORAL EVERY 4 HOURS PRN
Qty: 120 TABLET | Refills: 3 | Status: SHIPPED | OUTPATIENT
Start: 2018-01-21 | End: 2019-08-26

## 2018-01-21 RX ORDER — FERROUS SULFATE 325(65) MG
325 TABLET ORAL 2 TIMES DAILY WITH MEALS
Qty: 30 TABLET | Refills: 3 | Status: SHIPPED | OUTPATIENT
Start: 2018-01-21 | End: 2019-08-26

## 2018-01-21 RX ORDER — POLYETHYLENE GLYCOL 3350 17 G/17G
17 POWDER, FOR SOLUTION ORAL DAILY
Qty: 527 G | Refills: 0 | Status: SHIPPED | OUTPATIENT
Start: 2018-01-21 | End: 2018-02-20

## 2018-01-21 RX ORDER — ASPIRIN 81 MG/1
81 TABLET ORAL 2 TIMES DAILY
Qty: 30 TABLET | Refills: 3 | Status: SHIPPED | OUTPATIENT
Start: 2018-01-21 | End: 2019-08-26

## 2018-01-21 RX ADMIN — NIFEDIPINE 90 MG: 90 TABLET, FILM COATED, EXTENDED RELEASE ORAL at 08:00

## 2018-01-21 RX ADMIN — FERROUS SULFATE TAB 325 MG (65 MG ELEMENTAL FE) 325 MG: 325 (65 FE) TAB at 07:59

## 2018-01-21 RX ADMIN — CARVEDILOL 25 MG: 25 TABLET, FILM COATED ORAL at 07:59

## 2018-01-21 RX ADMIN — Medication 1 G: at 08:00

## 2018-01-21 RX ADMIN — ASPIRIN 81 MG: 81 TABLET, COATED ORAL at 07:59

## 2018-01-21 RX ADMIN — Medication 10 ML: at 08:03

## 2018-01-21 NOTE — DISCHARGE SUMMARY
31.1*  32.2*  32.0*   PLT  158  153  135     Recent Labs      01/18/18   1625  01/19/18   0342  01/20/18   0324  01/21/18   0245   NA  131*  136  137  138   K  4.3   --    --    --    ANIONGAP  12  13  12  11   CL  94*  101  104  105   CO2  25  22  21*  22   BUN  19  22  26*  18   CREATININE  0.8  1.0*  0.8  0.6   GLUCOSE  128*  141*  102  96   CALCIUM  8.8  7.9*  7.6*  7.4*     Lab Results   Component Value Date    CALCIUM 7.4 (L) 01/21/2018     Recent Labs      01/18/18   1625   AST  22   ALT  14   BILITOT  1.0   ALKPHOS  62   Troponin T:   Recent Labs      01/18/18   1625   TROPONINI  <0.01     INR:   Recent Labs      01/18/18   2057   INR  1.02     UA:  Recent Labs      01/19/18   0603   COLORU  YELLOW   PHUR  6.0   WBCUA  95*   RBCUA  3   BACTERIA  4+   CLARITYU  CLOUDY*   SPECGRAV  1.013   LEUKOCYTESUR  MODERATE*   UROBILINOGEN  0.2   BILIRUBINUR  Negative   BLOODU  Negative   GLUCOSEU  Negative     CXR 1/18/18  1. No acute appearing infiltrate or effusion. 2. Biapical scarring. Stable bronchial wall thickening. 3. Probable hiatal hernia. Signed by Dr Heber Majano on 1/18/2018 4:02 PM     XR Hip 1/18/18  A moderately impacted subcapital transcervical fracture of  the right proximal femur. Moderate diffuse osteopenia. Signed by Dr Montez Fuchs on 1/18/2018 4:02 PM    Discharge Instructions & Follow up: Activity: activity as tolerated - gradually increase  Diet: DIET GENERAL;  Dietary Nutrition Supplements: Low Calorie High Protein Supplement  Wound Care: follow ortho's instructions  Follow-up with NAOMI Willis in 1 week.     Time Spent at discharge 25 minutes    Signed:  EJ Proctor

## 2018-01-21 NOTE — CARE COORDINATION
Checked with pt to verify receipt of equipment ordered for discharge. Pt and her family reported equipment was not yet delivered and were hoping to receive it before noon. This CM called [facility] and spoke with Veena Pritchett to establish estimated time of arrival.  He stated that he was unaware of the order that was placed yesterday and was informed of the order from someone else at the hospital earlier today. He reported that he was planning on delivering the equipment shortly before noon. Will continue to follow.   Electronically signed by Calvert Habermann, RN on 1/21/2018 at 10:59 AM

## 2018-01-22 VITALS
DIASTOLIC BLOOD PRESSURE: 62 MMHG | RESPIRATION RATE: 18 BRPM | SYSTOLIC BLOOD PRESSURE: 115 MMHG | HEART RATE: 72 BPM | OXYGEN SATURATION: 90 % | TEMPERATURE: 98.2 F

## 2018-01-22 LAB
EKG P AXIS: 85 DEGREES
EKG P-R INTERVAL: 212 MS
EKG Q-T INTERVAL: 388 MS
EKG QRS DURATION: 88 MS
EKG QTC CALCULATION (BAZETT): 423 MS
EKG T AXIS: 68 DEGREES

## 2018-01-22 PROCEDURE — 99283 EMERGENCY DEPT VISIT LOW MDM: CPT | Performed by: EMERGENCY MEDICINE

## 2018-01-22 ASSESSMENT — ENCOUNTER SYMPTOMS
RHINORRHEA: 0
WHEEZING: 0
SORE THROAT: 0
COUGH: 0
ABDOMINAL PAIN: 0
TROUBLE SWALLOWING: 0
CHEST TIGHTNESS: 0
SHORTNESS OF BREATH: 0
EYE PAIN: 0
PHOTOPHOBIA: 0
DIARRHEA: 0
CONSTIPATION: 0
NAUSEA: 0
ABDOMINAL DISTENTION: 0
VOMITING: 0
COLOR CHANGE: 0
BACK PAIN: 0

## 2018-01-22 NOTE — ED PROVIDER NOTES
commands  [place] Coma Scale Score: 15        PHYSICAL EXAM    (up to 7 for level 4, 8 or more for level 5)     ED Triage Vitals [01/21/18 2336]   BP Temp Temp Source Pulse Resp SpO2 Height Weight   119/62 98.2 °F (36.8 °C) Oral 75 17 90 % -- --       Physical Exam   Constitutional: She is oriented to person, place, and time. She appears well-developed and well-nourished. No distress. HENT:   Head: Normocephalic and atraumatic. Mouth/Throat: Oropharynx is clear and moist.   Eyes: Conjunctivae and EOM are normal. Pupils are equal, round, and reactive to light. Neck: Normal range of motion. Neck supple. No JVD present. Abdominal: Soft. Musculoskeletal: Normal range of motion. She exhibits no tenderness. Neurological: She is alert and oriented to person, place, and time. No cranial nerve deficit. Skin: Skin is warm and dry. Bruising noted. Psychiatric: She has a normal mood and affect. Her behavior is normal. Thought content normal.   Nursing note and vitals reviewed. DIAGNOSTIC RESULTS     EKG: All EKG's are interpreted by the Emergency Department Physician who either signs or Co-signs this chart in the absence of a cardiologist.        RADIOLOGY:   Non-plain film images such as CT, Ultrasound and MRI are read by the radiologist. Plain radiographic images are visualized and preliminarily interpreted by the emergency physician with the below findings:          No orders to display           LABS:  Labs Reviewed - No data to display    All other labs were within normal range or not returned as of this dictation.     EMERGENCY DEPARTMENT COURSE and DIFFERENTIAL DIAGNOSIS/MDM:   Vitals:    Vitals:    01/21/18 2336   BP: 119/62   Pulse: 75   Resp: 17   Temp: 98.2 °F (36.8 °C)   TempSrc: Oral   SpO2: 90%       MDM  Number of Diagnoses or Management Options  Postoperative hematoma of skin following non-dermatologic procedure: new and requires workup  Diagnosis management comments: Patient has a

## 2018-07-19 ENCOUNTER — HOSPITAL ENCOUNTER (EMERGENCY)
Age: 83
Discharge: HOME | End: 2018-07-19
Attending: EMERGENCY MEDICINE
Payer: MEDICARE

## 2018-07-19 VITALS
SYSTOLIC BLOOD PRESSURE: 162 MMHG | BODY MASS INDEX: 21.48 KG/M2 | TEMPERATURE: 97.6 F | DIASTOLIC BLOOD PRESSURE: 58 MMHG | HEART RATE: 62 BPM | RESPIRATION RATE: 18 BRPM | OXYGEN SATURATION: 94 % | WEIGHT: 110 LBS

## 2018-07-19 DIAGNOSIS — B37.31 CANDIDAL VULVOVAGINITIS: Primary | ICD-10-CM

## 2018-07-19 PROCEDURE — 99282 EMERGENCY DEPT VISIT SF MDM: CPT

## 2018-07-19 PROCEDURE — 6370000000 HC RX 637 (ALT 250 FOR IP): Performed by: EMERGENCY MEDICINE

## 2018-07-19 PROCEDURE — 99282 EMERGENCY DEPT VISIT SF MDM: CPT | Performed by: EMERGENCY MEDICINE

## 2018-07-19 RX ORDER — FLUCONAZOLE 150 MG/1
150 TABLET ORAL ONCE
Status: COMPLETED | OUTPATIENT
Start: 2018-07-19 | End: 2018-07-19

## 2018-07-19 RX ADMIN — FLUCONAZOLE 150 MG: 150 TABLET ORAL at 18:59

## 2018-07-19 ASSESSMENT — ENCOUNTER SYMPTOMS
VOMITING: 0
NAUSEA: 0

## 2018-07-19 NOTE — ED PROVIDER NOTES
[facility]  eMERGENCY dEPARTMENT eNCOUnter      Pt Name: Phillip Trotter  MRN: [id number]  [place] 9/11/1926  Date of evaluation: 7/19/2018  Provider: Fran Wallis MD    CHIEF COMPLAINT       Chief Complaint   Patient presents with    Female  Problem     pt states she has yeast infection that she cannot get rid of         HISTORY OF PRESENT ILLNESS   (Location/Symptom, Timing/Onset, Context/Setting, Quality, Duration, Modifying Factors, Severity)  Note limiting factors. Phillip Trotter is a 80 y.o. female who presents to the emergency department      The history is provided by the patient. Female  Problem   Quality: \"itchy\", \"raw\"  Timing:  Constant  Progression:  Improving (after OTC)  Chronicity:  Recurrent  Recent urinary tract infections: no    Urinary symptoms: no discolored urine, no foul-smelling urine, no frequent urination, no hematuria, no hesitancy and no bladder incontinence    Associated symptoms: vaginal discharge    Associated symptoms: no fever, no genital lesions, no nausea and no vomiting        Nursing Notes were reviewed. REVIEW OF SYSTEMS    (2-9 systems for level 4, 10 or more for level 5)     Review of Systems   Constitutional: Negative for fever. Gastrointestinal: Negative for nausea and vomiting. Genitourinary: Positive for vaginal discharge. All other systems reviewed and are negative. Except as noted above the remainder of the review of systems was reviewed and negative.        PAST MEDICAL HISTORY     Past Medical History:   Diagnosis Date    Closed right hip fracture, initial encounter ([facility] [address]     Hyperlipidemia     Hypertension          SURGICAL HISTORY       Past Surgical History:   Procedure Laterality Date    [facility] S[facility] Right 1/18/2018    HIP HEMIARTHROPLASTY performed by José Miguel Sandoval MD at [facility]       Discharge Medication List as of 7/19/2018  5:55 PM

## 2019-08-26 ENCOUNTER — HOSPITAL ENCOUNTER (EMERGENCY)
Age: 84
Discharge: HOME | End: 2019-08-26
Attending: EMERGENCY MEDICINE
Payer: MEDICARE

## 2019-08-26 ENCOUNTER — APPOINTMENT (OUTPATIENT)
Dept: GENERAL RADIOLOGY | Age: 84
End: 2019-08-26
Payer: MEDICARE

## 2019-08-26 VITALS
HEART RATE: 86 BPM | TEMPERATURE: 98.6 F | WEIGHT: 110 LBS | SYSTOLIC BLOOD PRESSURE: 148 MMHG | DIASTOLIC BLOOD PRESSURE: 85 MMHG | RESPIRATION RATE: 16 BRPM | BODY MASS INDEX: 21.6 KG/M2 | HEIGHT: 60 IN | OXYGEN SATURATION: 98 %

## 2019-08-26 DIAGNOSIS — R05.9 COUGH: Primary | ICD-10-CM

## 2019-08-26 LAB
ALBUMIN SERPL-MCNC: 3.7 G/DL (ref 3.5–5.2)
ALP BLD-CCNC: 84 U/L (ref 35–104)
ALT SERPL-CCNC: <5 U/L (ref 5–33)
ANION GAP SERPL CALCULATED.3IONS-SCNC: 10 MMOL/L (ref 7–19)
AST SERPL-CCNC: 11 U/L (ref 5–32)
BASOPHILS ABSOLUTE: 0 K/UL (ref 0–0.2)
BASOPHILS RELATIVE PERCENT: 0.3 % (ref 0–1)
BILIRUB SERPL-MCNC: 0.3 MG/DL (ref 0.2–1.2)
BUN BLDV-MCNC: 17 MG/DL (ref 8–23)
CALCIUM SERPL-MCNC: 9 MG/DL (ref 8.2–9.6)
CHLORIDE BLD-SCNC: 105 MMOL/L (ref 98–111)
CO2: 26 MMOL/L (ref 22–29)
CREAT SERPL-MCNC: 1 MG/DL (ref 0.5–0.9)
EOSINOPHILS ABSOLUTE: 0.1 K/UL (ref 0–0.6)
EOSINOPHILS RELATIVE PERCENT: 1.7 % (ref 0–5)
GFR NON-AFRICAN AMERICAN: 52
GLUCOSE BLD-MCNC: 99 MG/DL (ref 74–109)
HCT VFR BLD CALC: 33.2 % (ref 37–47)
HEMOGLOBIN: 10.4 G/DL (ref 12–16)
IMMATURE GRANULOCYTES #: 0 K/UL
LYMPHOCYTES ABSOLUTE: 1.1 K/UL (ref 1.1–4.5)
LYMPHOCYTES RELATIVE PERCENT: 18.2 % (ref 20–40)
MCH RBC QN AUTO: 32.3 PG (ref 27–31)
MCHC RBC AUTO-ENTMCNC: 31.3 G/DL (ref 33–37)
MCV RBC AUTO: 103.1 FL (ref 81–99)
MONOCYTES ABSOLUTE: 0.7 K/UL (ref 0–0.9)
MONOCYTES RELATIVE PERCENT: 10.9 % (ref 0–10)
NEUTROPHILS ABSOLUTE: 4.1 K/UL (ref 1.5–7.5)
NEUTROPHILS RELATIVE PERCENT: 68.6 % (ref 50–65)
PDW BLD-RTO: 13.4 % (ref 11.5–14.5)
PLATELET # BLD: 147 K/UL (ref 130–400)
PMV BLD AUTO: 11.1 FL (ref 9.4–12.3)
POTASSIUM SERPL-SCNC: 3.9 MMOL/L (ref 3.5–5)
RBC # BLD: 3.22 M/UL (ref 4.2–5.4)
SODIUM BLD-SCNC: 141 MMOL/L (ref 136–145)
TOTAL PROTEIN: 6.8 G/DL (ref 6.6–8.7)
WBC # BLD: 6 K/UL (ref 4.8–10.8)

## 2019-08-26 PROCEDURE — 85025 COMPLETE CBC W/AUTO DIFF WBC: CPT

## 2019-08-26 PROCEDURE — 99283 EMERGENCY DEPT VISIT LOW MDM: CPT

## 2019-08-26 PROCEDURE — 80053 COMPREHEN METABOLIC PANEL: CPT

## 2019-08-26 PROCEDURE — 36415 COLL VENOUS BLD VENIPUNCTURE: CPT

## 2019-08-26 PROCEDURE — 71046 X-RAY EXAM CHEST 2 VIEWS: CPT

## 2019-09-01 ASSESSMENT — ENCOUNTER SYMPTOMS
WHEEZING: 0
ABDOMINAL PAIN: 0
SHORTNESS OF BREATH: 1
COUGH: 1
VOMITING: 0
DIARRHEA: 0

## 2019-10-05 ENCOUNTER — HOSPITAL ENCOUNTER (EMERGENCY)
Age: 84
Discharge: HOME | End: 2019-10-05
Payer: MEDICARE

## 2019-10-05 ENCOUNTER — APPOINTMENT (OUTPATIENT)
Dept: CT IMAGING | Age: 84
End: 2019-10-05
Payer: MEDICARE

## 2019-10-05 VITALS
DIASTOLIC BLOOD PRESSURE: 89 MMHG | TEMPERATURE: 98.2 F | HEIGHT: 60 IN | OXYGEN SATURATION: 93 % | BODY MASS INDEX: 21.6 KG/M2 | SYSTOLIC BLOOD PRESSURE: 150 MMHG | WEIGHT: 110 LBS | RESPIRATION RATE: 18 BRPM | HEART RATE: 85 BPM

## 2019-10-05 DIAGNOSIS — H92.01 EAR PAIN, RIGHT: Primary | ICD-10-CM

## 2019-10-05 PROCEDURE — 72125 CT NECK SPINE W/O DYE: CPT

## 2019-10-05 PROCEDURE — 99283 EMERGENCY DEPT VISIT LOW MDM: CPT

## 2019-10-05 PROCEDURE — 70450 CT HEAD/BRAIN W/O DYE: CPT

## 2019-10-05 ASSESSMENT — PAIN SCALES - GENERAL: PAINLEVEL_OUTOF10: 8

## 2019-10-05 ASSESSMENT — PAIN DESCRIPTION - DESCRIPTORS: DESCRIPTORS: PATIENT UNABLE TO DESCRIBE

## 2019-10-07 ASSESSMENT — ENCOUNTER SYMPTOMS: VOMITING: 0

## 2020-01-07 ENCOUNTER — APPOINTMENT (OUTPATIENT)
Dept: CT IMAGING | Age: 85
DRG: 853 | End: 2020-01-07
Payer: MEDICARE

## 2020-01-07 ENCOUNTER — HOSPITAL ENCOUNTER (INPATIENT)
Age: 85
LOS: 6 days | Discharge: HOME | DRG: 853 | End: 2020-01-13
Attending: EMERGENCY MEDICINE | Admitting: HOSPITALIST
Payer: MEDICARE

## 2020-01-07 ENCOUNTER — APPOINTMENT (OUTPATIENT)
Dept: GENERAL RADIOLOGY | Age: 85
DRG: 853 | End: 2020-01-07
Payer: MEDICARE

## 2020-01-07 PROBLEM — S72.002A HIP FRACTURE REQUIRING OPERATIVE REPAIR, LEFT, CLOSED, INITIAL ENCOUNTER (HCC): Status: ACTIVE | Noted: 2020-01-07

## 2020-01-07 LAB
ANION GAP SERPL CALCULATED.3IONS-SCNC: 17 MMOL/L (ref 7–19)
APTT: 28.1 SEC (ref 26–36.2)
BACTERIA: NEGATIVE /HPF
BASE EXCESS ARTERIAL: -0.7 MMOL/L (ref -2–2)
BASOPHILS ABSOLUTE: 0 K/UL (ref 0–0.2)
BASOPHILS RELATIVE PERCENT: 0.2 % (ref 0–1)
BILIRUBIN URINE: NEGATIVE
BLOOD, URINE: NEGATIVE
BUN BLDV-MCNC: 13 MG/DL (ref 8–23)
CALCIUM SERPL-MCNC: 9.3 MG/DL (ref 8.2–9.6)
CARBOXYHEMOGLOBIN ARTERIAL: 2 % (ref 0–5)
CHLORIDE BLD-SCNC: 102 MMOL/L (ref 98–111)
CLARITY: CLEAR
CO2: 20 MMOL/L (ref 22–29)
COLOR: YELLOW
CREAT SERPL-MCNC: 0.6 MG/DL (ref 0.5–0.9)
EOSINOPHILS ABSOLUTE: 0 K/UL (ref 0–0.6)
EOSINOPHILS RELATIVE PERCENT: 0.2 % (ref 0–5)
EPITHELIAL CELLS, UA: 3 /HPF (ref 0–5)
GFR NON-AFRICAN AMERICAN: >60
GLUCOSE BLD-MCNC: 179 MG/DL (ref 74–109)
GLUCOSE URINE: NEGATIVE MG/DL
HCO3 ARTERIAL: 23.4 MMOL/L (ref 22–26)
HCT VFR BLD CALC: 40.6 % (ref 37–47)
HEMOGLOBIN, ART, EXTENDED: 12.7 G/DL (ref 12–16)
HEMOGLOBIN: 12.9 G/DL (ref 12–16)
HYALINE CASTS: 3 /HPF (ref 0–8)
IMMATURE GRANULOCYTES #: 0.2 K/UL
INR BLD: 1.11 (ref 0.88–1.18)
KETONES, URINE: NEGATIVE MG/DL
LEUKOCYTE ESTERASE, URINE: ABNORMAL
LYMPHOCYTES ABSOLUTE: 0.9 K/UL (ref 1.1–4.5)
LYMPHOCYTES RELATIVE PERCENT: 4.7 % (ref 20–40)
MCH RBC QN AUTO: 35.8 PG (ref 27–31)
MCHC RBC AUTO-ENTMCNC: 31.8 G/DL (ref 33–37)
MCV RBC AUTO: 112.8 FL (ref 81–99)
METHEMOGLOBIN ARTERIAL: 0.8 %
MONOCYTES ABSOLUTE: 0.3 K/UL (ref 0–0.9)
MONOCYTES RELATIVE PERCENT: 1.7 % (ref 0–10)
NEUTROPHILS ABSOLUTE: 18 K/UL (ref 1.5–7.5)
NEUTROPHILS RELATIVE PERCENT: 92.4 % (ref 50–65)
NITRITE, URINE: NEGATIVE
O2 CONTENT ARTERIAL: 14.7 ML/DL
O2 SAT, ARTERIAL: 82.2 %
O2 THERAPY: ABNORMAL
PCO2 ARTERIAL: 36 MMHG (ref 35–45)
PDW BLD-RTO: 11.4 % (ref 11.5–14.5)
PH ARTERIAL: 7.42 (ref 7.35–7.45)
PH UA: 6 (ref 5–8)
PLATELET # BLD: 152 K/UL (ref 130–400)
PMV BLD AUTO: 12.5 FL (ref 9.4–12.3)
PO2 ARTERIAL: 45 MMHG (ref 80–100)
POTASSIUM REFLEX MAGNESIUM: 3.8 MMOL/L (ref 3.5–5)
POTASSIUM, WHOLE BLOOD: 3.2
PROTEIN UA: NEGATIVE MG/DL
PROTHROMBIN TIME: 13.7 SEC (ref 12–14.6)
RBC # BLD: 3.6 M/UL (ref 4.2–5.4)
RBC UA: 3 /HPF (ref 0–4)
SODIUM BLD-SCNC: 139 MMOL/L (ref 136–145)
SPECIFIC GRAVITY UA: 1.01 (ref 1–1.03)
TROPONIN: <0.01 NG/ML (ref 0–0.03)
URINE REFLEX TO CULTURE: YES
UROBILINOGEN, URINE: 0.2 E.U./DL
WBC # BLD: 19.4 K/UL (ref 4.8–10.8)
WBC UA: 4 /HPF (ref 0–5)

## 2020-01-07 PROCEDURE — 82803 BLOOD GASES ANY COMBINATION: CPT

## 2020-01-07 PROCEDURE — 71045 X-RAY EXAM CHEST 1 VIEW: CPT

## 2020-01-07 PROCEDURE — 70450 CT HEAD/BRAIN W/O DYE: CPT

## 2020-01-07 PROCEDURE — 96375 TX/PRO/DX INJ NEW DRUG ADDON: CPT

## 2020-01-07 PROCEDURE — 6360000002 HC RX W HCPCS: Performed by: EMERGENCY MEDICINE

## 2020-01-07 PROCEDURE — 84132 ASSAY OF SERUM POTASSIUM: CPT

## 2020-01-07 PROCEDURE — 84484 ASSAY OF TROPONIN QUANT: CPT

## 2020-01-07 PROCEDURE — 6360000002 HC RX W HCPCS

## 2020-01-07 PROCEDURE — 80048 BASIC METABOLIC PNL TOTAL CA: CPT

## 2020-01-07 PROCEDURE — 85610 PROTHROMBIN TIME: CPT

## 2020-01-07 PROCEDURE — 6370000000 HC RX 637 (ALT 250 FOR IP): Performed by: HOSPITALIST

## 2020-01-07 PROCEDURE — 96374 THER/PROPH/DIAG INJ IV PUSH: CPT

## 2020-01-07 PROCEDURE — 6360000002 HC RX W HCPCS: Performed by: HOSPITALIST

## 2020-01-07 PROCEDURE — 2580000003 HC RX 258: Performed by: EMERGENCY MEDICINE

## 2020-01-07 PROCEDURE — 36415 COLL VENOUS BLD VENIPUNCTURE: CPT

## 2020-01-07 PROCEDURE — 93005 ELECTROCARDIOGRAM TRACING: CPT | Performed by: EMERGENCY MEDICINE

## 2020-01-07 PROCEDURE — 81001 URINALYSIS AUTO W/SCOPE: CPT

## 2020-01-07 PROCEDURE — 87086 URINE CULTURE/COLONY COUNT: CPT

## 2020-01-07 PROCEDURE — 36600 WITHDRAWAL OF ARTERIAL BLOOD: CPT

## 2020-01-07 PROCEDURE — 1210000000 HC MED SURG R&B

## 2020-01-07 PROCEDURE — 99285 EMERGENCY DEPT VISIT HI MDM: CPT

## 2020-01-07 PROCEDURE — 85025 COMPLETE CBC W/AUTO DIFF WBC: CPT

## 2020-01-07 PROCEDURE — 73502 X-RAY EXAM HIP UNI 2-3 VIEWS: CPT

## 2020-01-07 PROCEDURE — 2580000003 HC RX 258: Performed by: HOSPITALIST

## 2020-01-07 PROCEDURE — 85730 THROMBOPLASTIN TIME PARTIAL: CPT

## 2020-01-07 RX ORDER — MORPHINE SULFATE 4 MG/ML
4 INJECTION, SOLUTION INTRAMUSCULAR; INTRAVENOUS
Status: DISCONTINUED | OUTPATIENT
Start: 2020-01-07 | End: 2020-01-08

## 2020-01-07 RX ORDER — MAGNESIUM SULFATE 1 G/100ML
1 INJECTION INTRAVENOUS PRN
Status: DISCONTINUED | OUTPATIENT
Start: 2020-01-07 | End: 2020-01-13 | Stop reason: HOSPADM

## 2020-01-07 RX ORDER — CARVEDILOL 25 MG/1
25 TABLET ORAL 2 TIMES DAILY WITH MEALS
Status: DISCONTINUED | OUTPATIENT
Start: 2020-01-07 | End: 2020-01-13 | Stop reason: HOSPADM

## 2020-01-07 RX ORDER — SODIUM CHLORIDE 0.9 % (FLUSH) 0.9 %
10 SYRINGE (ML) INJECTION PRN
Status: DISCONTINUED | OUTPATIENT
Start: 2020-01-07 | End: 2020-01-08 | Stop reason: SDUPTHER

## 2020-01-07 RX ORDER — ONDANSETRON 2 MG/ML
4 INJECTION INTRAMUSCULAR; INTRAVENOUS EVERY 6 HOURS PRN
Status: DISCONTINUED | OUTPATIENT
Start: 2020-01-07 | End: 2020-01-13 | Stop reason: HOSPADM

## 2020-01-07 RX ORDER — POTASSIUM CHLORIDE 7.45 MG/ML
10 INJECTION INTRAVENOUS PRN
Status: DISCONTINUED | OUTPATIENT
Start: 2020-01-07 | End: 2020-01-13 | Stop reason: HOSPADM

## 2020-01-07 RX ORDER — SODIUM CHLORIDE 0.9 % (FLUSH) 0.9 %
10 SYRINGE (ML) INJECTION EVERY 12 HOURS SCHEDULED
Status: DISCONTINUED | OUTPATIENT
Start: 2020-01-07 | End: 2020-01-08 | Stop reason: SDUPTHER

## 2020-01-07 RX ORDER — SODIUM CHLORIDE, SODIUM LACTATE, POTASSIUM CHLORIDE, CALCIUM CHLORIDE 600; 310; 30; 20 MG/100ML; MG/100ML; MG/100ML; MG/100ML
1000 INJECTION, SOLUTION INTRAVENOUS ONCE
Status: COMPLETED | OUTPATIENT
Start: 2020-01-07 | End: 2020-01-07

## 2020-01-07 RX ORDER — ONDANSETRON 2 MG/ML
INJECTION INTRAMUSCULAR; INTRAVENOUS
Status: COMPLETED
Start: 2020-01-07 | End: 2020-01-07

## 2020-01-07 RX ADMIN — ONDANSETRON 4 MG: 2 INJECTION INTRAMUSCULAR; INTRAVENOUS at 16:58

## 2020-01-07 RX ADMIN — MORPHINE SULFATE 4 MG: 4 INJECTION, SOLUTION INTRAMUSCULAR; INTRAVENOUS at 16:58

## 2020-01-07 RX ADMIN — SODIUM CHLORIDE, PRESERVATIVE FREE 10 ML: 5 INJECTION INTRAVENOUS at 20:43

## 2020-01-07 RX ADMIN — SODIUM CHLORIDE, POTASSIUM CHLORIDE, SODIUM LACTATE AND CALCIUM CHLORIDE 1000 ML: 600; 310; 30; 20 INJECTION, SOLUTION INTRAVENOUS at 16:58

## 2020-01-07 RX ADMIN — CARVEDILOL 25 MG: 25 TABLET, FILM COATED ORAL at 20:43

## 2020-01-07 RX ADMIN — ENOXAPARIN SODIUM 40 MG: 40 INJECTION SUBCUTANEOUS at 20:43

## 2020-01-07 ASSESSMENT — ENCOUNTER SYMPTOMS
NAUSEA: 0
PHOTOPHOBIA: 0
DIARRHEA: 0
ABDOMINAL PAIN: 0
COUGH: 0
EYE PAIN: 0
RHINORRHEA: 0
SORE THROAT: 0
BACK PAIN: 0
SHORTNESS OF BREATH: 0
VOMITING: 0
CHEST TIGHTNESS: 0

## 2020-01-07 ASSESSMENT — PAIN SCALES - GENERAL
PAINLEVEL_OUTOF10: 10
PAINLEVEL_OUTOF10: 10
PAINLEVEL_OUTOF10: 7

## 2020-01-07 NOTE — ED NOTES
ASSESSMENT:  Patent complains of pain after falling from the standing position on the left hip    PT ALERT/ORIENTED X4. PUPILS EQUAL/REACTIVE    SKIN:  WARM/DRY PINK CAPILLARY REFILL < 2SECS    CARDIAC:  S1 S2 NOTED     LUNGS: CLEAR UPPER AND LOWER LOBES, RESPIRATIONS EVEN/UNLABORED     ABDOMEN: BOWEL SOUNDS NOTED UPPER AND LOWER QUADRANTS                     SOFT AND NONTENDER. EXTREMITIES:  BILATERAL DP AND PT AND NO EDEMA NOTED. NO DISTRESS NOTED. SIDE RAILS UP AND CALL LIGHT IN REACH.                 Judy Escalona RN  01/07/20 7001

## 2020-01-07 NOTE — ED PROVIDER NOTES
[facility] [address] [facility]      Pt Name: William Francois  MRN: [id number]  [place] 9/11/1926  Date of evaluation: 1/7/2020  Provider: Horacio Jensen MD    CHIEF COMPLAINT       Chief Complaint   Patient presents with    Hip Pain     Left hip pain after fall. HISTORY OF PRESENT ILLNESS   (Location/Symptom, Timing/Onset, Context/Setting, Quality, Duration, Modifying Factors, Severity)  Note limiting factors. William Francois is a 80 y.o. female who presents to the emergency department with     Left hip pain status post fall. Patient states that she was reaching to put something in the garbage can lost her balance and fell onto her left hip. She denies loss of consciousness. Denies hitting her head. Has been unable to ambulate since the fall. Has worse pain when trying to bear weight or move her hip. Better with resting. Denies pain in any other area. No other complaints currently. Family states that Dr. Jaden Suazo did her other hip replacement when it was fractured. Nursing Notes were reviewed. REVIEW OF SYSTEMS    (2-9 systems for level 4,10 or more for level 5)     Review of Systems   Constitutional: Negative for activity change, appetite change, chills and fever. HENT: Negative for congestion, nosebleeds, rhinorrhea and sore throat. Eyes: Negative for photophobia, pain and visual disturbance. Respiratory: Negative for cough, chest tightness and shortness of breath. Cardiovascular: Negative for chest pain and palpitations. Gastrointestinal: Negative for abdominal pain, diarrhea, nausea and vomiting. Genitourinary: Negative for dysuria, flank pain and hematuria. Musculoskeletal: Positive for arthralgias. Negative for back pain, myalgias and neck pain. Skin: Negative for rash and wound. Neurological: Negative for dizziness, syncope, speech difficulty, weakness, light-headedness and headaches.    Psychiatric/Behavioral: Negative for confusion, hallucinations and suicidal ideas. PAST MEDICAL HISTORY     Past Medical History:   Diagnosis Date    Closed right hip fracture, initial encounter ([facility] [address]     Hyperlipidemia     Hypertension        SURGICAL HISTORY       Past Surgical History:   Procedure Laterality Date    [place] [facility] HIP SOCKET Right 1/18/2018    HIP HEMIARTHROPLASTY performed by Lissette Sprague MD at [place] [address]       Current Discharge Medication List      CONTINUE these medications which have NOT CHANGED    Details   potassium citrate (UROCIT-K) 10 MEQ (1080 MG) extended release tablet Take by mouth 2 times daily      carvedilol (COREG) 25 MG tablet Take 25 mg by mouth 2 times daily (with meals)             ALLERGIES     Patient has no known allergies. FAMILY HISTORY     No family history on file. SOCIAL HISTORY       Social History     Socioeconomic History    Marital status:       Spouse name: Not on file    Number of children: Not on file    Years of education: Not on file    Highest education level: Not on file   Occupational History    Not on file   Social Needs    Financial resource strain: Not on file    Food insecurity:     Worry: Not on file     Inability: Not on file    Transportation needs:     Medical: Not on file     Non-medical: Not on file   Tobacco Use    Smoking status: Never Smoker    Smokeless tobacco: Never Used   Substance and Sexual Activity    Alcohol use: No    Drug use: No    Sexual activity: Not on file   Lifestyle    Physical activity:     Days per week: Not on file     Minutes per session: Not on file    Stress: Not on file   Relationships    Social connections:     Talks on phone: Not on file     Gets together: Not on file     Attends [employer]ism service: Not on file     Active member of club or organization: Not on file     Attends meetings of clubs or organizations: Not on file     Relationship status: Not on file    Intimate partner violence:     Fear of current or ex partner: Not on file     Emotionally abused: Not on file     Physically abused: Not on file     Forced sexual activity: Not on file   Other Topics Concern    Not on file   Social History Narrative    Not on file       SCREENINGS    [place] Coma Scale  Eye Opening: Spontaneous  Best Verbal Response: Oriented  Best Motor Response: Obeys commands  [place] Coma Scale Score: 15      PHYSICAL EXAM    (up to 7 for level 4, 8 or more for level 5)     ED Triage Vitals [01/07/20 1620]   BP Temp Temp Source Pulse Resp SpO2 Height Weight   (!) 183/95 98 °F (36.7 °C) Oral 98 20 (!) 82 % 5' (1.524 m) 110 lb (49.9 kg)       Physical Exam  Vitals signs and nursing note reviewed. Constitutional:       General: She is not in acute distress. Appearance: She is well-developed. HENT:      Head: Normocephalic and atraumatic. Right Ear: External ear normal.      Left Ear: External ear normal.      Nose: Nose normal.      Mouth/Throat:      Mouth: Mucous membranes are moist.   Eyes:      General: No scleral icterus. Pupils: Pupils are equal, round, and reactive to light. Neck:      Musculoskeletal: Normal range of motion and neck supple. Trachea: No tracheal deviation. Comments: No cervical spinous process tenderness, step-offs, deformities  Cardiovascular:      Rate and Rhythm: Normal rate and regular rhythm. Heart sounds: Murmur present. Systolic murmur present with a grade of 2/6. No friction rub. No gallop. Pulmonary:      Effort: Pulmonary effort is normal. No respiratory distress. Breath sounds: Normal breath sounds. No wheezing or rales. Chest:      Chest wall: No tenderness. Abdominal:      General: There is no distension. Palpations: Abdomen is soft. There is no mass. Tenderness: There is no tenderness. There is no guarding or rebound. Musculoskeletal:         General: Tenderness and deformity present.       Left hip: She tel. ,  soco ross rn, 01/07/2020 16:39, by [facility]   BASIC METABOLIC PANEL W/ REFLEX TO MG FOR LOW K - Abnormal; Notable for the following components:    CO2 20 (*)     Glucose 179 (*)     All other components within normal limits   CBC WITH AUTO DIFFERENTIAL - Abnormal; Notable for the following components:    WBC 19.4 (*)     RBC 3.60 (*)     .8 (*)     MCH 35.8 (*)     MCHC 31.8 (*)     RDW 11.4 (*)     MPV 12.5 (*)     Neutrophils % 92.4 (*)     Lymphocytes % 4.7 (*)     Neutrophils Absolute 18.0 (*)     Lymphocytes Absolute 0.9 (*)     All other components within normal limits   URINE RT REFLEX TO CULTURE - Abnormal; Notable for the following components:    Leukocyte Esterase, Urine SMALL (*)     All other components within normal limits   URINE CULTURE   POTASSIUM, WHOLE BLOOD   TROPONIN   APTT   PROTIME-INR   MICROSCOPIC URINALYSIS   BASIC METABOLIC PANEL W/ REFLEX TO MG FOR LOW K   CBC     other labs were within normal range or not returned as of this dictation. EMERGENCY DEPARTMENT COURSE and DIFFERENTIAL DIAGNOSIS/MDM:   Vitals:    Vitals:    01/07/20 1733 01/07/20 1832 01/07/20 1930 01/07/20 2230   BP: (!) 145/81 134/82 (!) 143/89 117/82   Pulse: 91 100 111 108   Resp: 23 20 16 14   Temp:   98.3 °F (36.8 °C) 98 °F (36.7 °C)   TempSrc:   Temporal Temporal   SpO2: 93% 90% 90% 91%   Weight:       Height:           MDM  Number of Diagnoses or Management Options  Diagnosis management comments: 78-year-old female with what appears to be a mechanical fall presenting with left hip pain. Plan for labs, imaging, EKG. Disposition dependent upon work-up and patient ability to ambulate. ED Course       Seen and evaluated for her fall. Work-up was initiated with pain control, labs, fluids, oxygen, imaging. Work-up did reveal a subcapital femoral neck fracture. Informed patient and son. Questions invited and answered. Paged to orthopedics. Awaiting call back at 1824.   Spoke with Dr. Killian Erickson from orthopedics, requesting patient to be admitted to medicine and with n.p.o. after midnight. Subsequently spoke with the hospitalist, case discussed, questions invited and answered, will admit patient for continued care of her medical conditions and preoperative medical risk stratification. I had a detailed discussion with the patient and/or guardian regarding the historical points, exam findings, and any diagnostic results supporting the admission diagnosis. The patient was educated on care and need for admission. Questions were invited and answered. Patient/guardian shows understanding of admission information and agrees to follow. CONSULTS:  IP CONSULT TO ORTHOPEDIC SURGERY  IP CONSULT TO HOSPITALIST  IP CONSULT TO CARDIOLOGY  IP CONSULT TO DIETITIAN  IP CONSULT TO SOCIAL WORK    PROCEDURES:  None unless otherwise noted below,     Procedures    FINAL IMPRESSION      1. Closed fracture of left hip, initial encounter ([facility] [address]    2.  Fall from bed, initial encounter          DISPOSITION/PLAN   DISPOSITION        PATIENT REFERRED TO:  NAOMI Negrete - MOMO GRI[facility] [address]  [address]             DISCHARGE MEDICATIONS:  Current Discharge Medication List             (Pleasenote that portions of this note were completed with a voice recognition program.  Efforts were made to edit the dictations but occasionally words are mis-transcribed.)    Nile Rice MD (electronically signed)  Attending Emergency Physician          Nile Rice MD  01/07/20 4987

## 2020-01-08 ENCOUNTER — ANESTHESIA (OUTPATIENT)
Dept: OPERATING ROOM | Age: 85
DRG: 853 | End: 2020-01-08
Payer: MEDICARE

## 2020-01-08 ENCOUNTER — APPOINTMENT (OUTPATIENT)
Dept: GENERAL RADIOLOGY | Age: 85
DRG: 853 | End: 2020-01-08
Payer: MEDICARE

## 2020-01-08 ENCOUNTER — ANESTHESIA EVENT (OUTPATIENT)
Dept: OPERATING ROOM | Age: 85
DRG: 853 | End: 2020-01-08
Payer: MEDICARE

## 2020-01-08 ENCOUNTER — APPOINTMENT (OUTPATIENT)
Dept: CT IMAGING | Age: 85
DRG: 853 | End: 2020-01-08
Payer: MEDICARE

## 2020-01-08 VITALS
RESPIRATION RATE: 12 BRPM | TEMPERATURE: 97 F | SYSTOLIC BLOOD PRESSURE: 98 MMHG | DIASTOLIC BLOOD PRESSURE: 58 MMHG | OXYGEN SATURATION: 99 %

## 2020-01-08 PROBLEM — E87.5 HYPERKALEMIA: Status: ACTIVE | Noted: 2020-01-08

## 2020-01-08 PROBLEM — R79.89 D-DIMER, ELEVATED: Status: ACTIVE | Noted: 2020-01-08

## 2020-01-08 PROBLEM — R79.89 ELEVATED BRAIN NATRIURETIC PEPTIDE (BNP) LEVEL: Status: ACTIVE | Noted: 2020-01-08

## 2020-01-08 PROBLEM — A41.9 SEPSIS (HCC): Status: ACTIVE | Noted: 2020-01-08

## 2020-01-08 PROBLEM — J96.01 ACUTE HYPOXEMIC RESPIRATORY FAILURE (HCC): Status: ACTIVE | Noted: 2020-01-08

## 2020-01-08 LAB
ABO/RH: NORMAL
ABO/RH: NORMAL
ANION GAP SERPL CALCULATED.3IONS-SCNC: 12 MMOL/L (ref 7–19)
ANION GAP SERPL CALCULATED.3IONS-SCNC: 14 MMOL/L (ref 7–19)
ANTIBODY SCREEN: NORMAL
BASOPHILS ABSOLUTE: 0 K/UL (ref 0–0.2)
BASOPHILS RELATIVE PERCENT: 0.3 % (ref 0–1)
BUN BLDV-MCNC: 20 MG/DL (ref 8–23)
BUN BLDV-MCNC: 24 MG/DL (ref 8–23)
CALCIUM SERPL-MCNC: 8.8 MG/DL (ref 8.2–9.6)
CALCIUM SERPL-MCNC: 9.2 MG/DL (ref 8.2–9.6)
CHLORIDE BLD-SCNC: 102 MMOL/L (ref 98–111)
CHLORIDE BLD-SCNC: 103 MMOL/L (ref 98–111)
CO2: 23 MMOL/L (ref 22–29)
CO2: 25 MMOL/L (ref 22–29)
CREAT SERPL-MCNC: 0.8 MG/DL (ref 0.5–0.9)
CREAT SERPL-MCNC: 0.9 MG/DL (ref 0.5–0.9)
D DIMER: 16.21 UG/ML FEU (ref 0–0.48)
EKG P AXIS: -52 DEGREES
EKG P-R INTERVAL: 194 MS
EKG Q-T INTERVAL: 354 MS
EKG QRS DURATION: 80 MS
EKG QTC CALCULATION (BAZETT): 411 MS
EKG T AXIS: 73 DEGREES
EOSINOPHILS ABSOLUTE: 0.3 K/UL (ref 0–0.6)
EOSINOPHILS RELATIVE PERCENT: 2.4 % (ref 0–5)
GFR NON-AFRICAN AMERICAN: 58
GFR NON-AFRICAN AMERICAN: >60
GLUCOSE BLD-MCNC: 133 MG/DL (ref 74–109)
GLUCOSE BLD-MCNC: 135 MG/DL (ref 74–109)
HBA1C MFR BLD: 4.5 % (ref 4–6)
HCT VFR BLD CALC: 33.6 % (ref 37–47)
HCT VFR BLD CALC: 35 % (ref 37–47)
HCT VFR BLD CALC: 36.2 % (ref 37–47)
HEMOGLOBIN: 10.9 G/DL (ref 12–16)
HEMOGLOBIN: 11.1 G/DL (ref 12–16)
HEMOGLOBIN: 11.5 G/DL (ref 12–16)
IMMATURE GRANULOCYTES #: 0.1 K/UL
LACTIC ACID: 1.5 MMOL/L (ref 0.5–1.9)
LV EF: 79 %
LVEF MODALITY: NORMAL
LYMPHOCYTES ABSOLUTE: 0.5 K/UL (ref 1.1–4.5)
LYMPHOCYTES RELATIVE PERCENT: 3.7 % (ref 20–40)
MCH RBC QN AUTO: 35.4 PG (ref 27–31)
MCH RBC QN AUTO: 35.5 PG (ref 27–31)
MCH RBC QN AUTO: 36.2 PG (ref 27–31)
MCHC RBC AUTO-ENTMCNC: 31.7 G/DL (ref 33–37)
MCHC RBC AUTO-ENTMCNC: 31.8 G/DL (ref 33–37)
MCHC RBC AUTO-ENTMCNC: 32.4 G/DL (ref 33–37)
MCV RBC AUTO: 111.5 FL (ref 81–99)
MCV RBC AUTO: 111.6 FL (ref 81–99)
MCV RBC AUTO: 111.7 FL (ref 81–99)
MONOCYTES ABSOLUTE: 0.5 K/UL (ref 0–0.9)
MONOCYTES RELATIVE PERCENT: 3.8 % (ref 0–10)
NEUTROPHILS ABSOLUTE: 12 K/UL (ref 1.5–7.5)
NEUTROPHILS RELATIVE PERCENT: 89.4 % (ref 50–65)
PDW BLD-RTO: 11.6 % (ref 11.5–14.5)
PDW BLD-RTO: 11.7 % (ref 11.5–14.5)
PDW BLD-RTO: 11.7 % (ref 11.5–14.5)
PLATELET # BLD: 107 K/UL (ref 130–400)
PLATELET # BLD: 130 K/UL (ref 130–400)
PLATELET # BLD: 135 K/UL (ref 130–400)
PMV BLD AUTO: 12.2 FL (ref 9.4–12.3)
PMV BLD AUTO: 12.2 FL (ref 9.4–12.3)
PMV BLD AUTO: 12.8 FL (ref 9.4–12.3)
POTASSIUM REFLEX MAGNESIUM: 4.4 MMOL/L (ref 3.5–5)
POTASSIUM REFLEX MAGNESIUM: 5.1 MMOL/L (ref 3.5–5)
POTASSIUM SERPL-SCNC: 4.5 MMOL/L (ref 3.5–4.9)
PRO-BNP: ABNORMAL PG/ML (ref 0–1800)
RBC # BLD: 3.01 M/UL (ref 4.2–5.4)
RBC # BLD: 3.14 M/UL (ref 4.2–5.4)
RBC # BLD: 3.24 M/UL (ref 4.2–5.4)
SODIUM BLD-SCNC: 139 MMOL/L (ref 136–145)
SODIUM BLD-SCNC: 140 MMOL/L (ref 136–145)
WBC # BLD: 13.5 K/UL (ref 4.8–10.8)
WBC # BLD: 16.4 K/UL (ref 4.8–10.8)
WBC # BLD: 17.5 K/UL (ref 4.8–10.8)

## 2020-01-08 PROCEDURE — 84132 ASSAY OF SERUM POTASSIUM: CPT

## 2020-01-08 PROCEDURE — 86901 BLOOD TYPING SEROLOGIC RH(D): CPT

## 2020-01-08 PROCEDURE — 83880 ASSAY OF NATRIURETIC PEPTIDE: CPT

## 2020-01-08 PROCEDURE — 85027 COMPLETE CBC AUTOMATED: CPT

## 2020-01-08 PROCEDURE — 2580000003 HC RX 258: Performed by: INTERNAL MEDICINE

## 2020-01-08 PROCEDURE — C1769 GUIDE WIRE: HCPCS | Performed by: ORTHOPAEDIC SURGERY

## 2020-01-08 PROCEDURE — 2580000003 HC RX 258: Performed by: ANESTHESIOLOGY

## 2020-01-08 PROCEDURE — 6360000002 HC RX W HCPCS: Performed by: INTERNAL MEDICINE

## 2020-01-08 PROCEDURE — 2580000003 HC RX 258: Performed by: ORTHOPAEDIC SURGERY

## 2020-01-08 PROCEDURE — 71275 CT ANGIOGRAPHY CHEST: CPT

## 2020-01-08 PROCEDURE — 85379 FIBRIN DEGRADATION QUANT: CPT

## 2020-01-08 PROCEDURE — 99223 1ST HOSP IP/OBS HIGH 75: CPT | Performed by: INTERNAL MEDICINE

## 2020-01-08 PROCEDURE — 73502 X-RAY EXAM HIP UNI 2-3 VIEWS: CPT

## 2020-01-08 PROCEDURE — 86900 BLOOD TYPING SEROLOGIC ABO: CPT

## 2020-01-08 PROCEDURE — 3700000000 HC ANESTHESIA ATTENDED CARE: Performed by: ORTHOPAEDIC SURGERY

## 2020-01-08 PROCEDURE — 85025 COMPLETE CBC W/AUTO DIFF WBC: CPT

## 2020-01-08 PROCEDURE — 6360000002 HC RX W HCPCS: Performed by: NURSE ANESTHETIST, CERTIFIED REGISTERED

## 2020-01-08 PROCEDURE — C1713 ANCHOR/SCREW BN/BN,TIS/BN: HCPCS | Performed by: ORTHOPAEDIC SURGERY

## 2020-01-08 PROCEDURE — 2720000010 HC SURG SUPPLY STERILE: Performed by: ORTHOPAEDIC SURGERY

## 2020-01-08 PROCEDURE — 3600000013 HC SURGERY LEVEL 3 ADDTL 15MIN: Performed by: ORTHOPAEDIC SURGERY

## 2020-01-08 PROCEDURE — 83036 HEMOGLOBIN GLYCOSYLATED A1C: CPT

## 2020-01-08 PROCEDURE — 2700000000 HC OXYGEN THERAPY PER DAY

## 2020-01-08 PROCEDURE — 2709999900 HC NON-CHARGEABLE SUPPLY: Performed by: ORTHOPAEDIC SURGERY

## 2020-01-08 PROCEDURE — 93010 ELECTROCARDIOGRAM REPORT: CPT | Performed by: INTERNAL MEDICINE

## 2020-01-08 PROCEDURE — 93306 TTE W/DOPPLER COMPLETE: CPT

## 2020-01-08 PROCEDURE — 3600000003 HC SURGERY LEVEL 3 BASE: Performed by: ORTHOPAEDIC SURGERY

## 2020-01-08 PROCEDURE — 36415 COLL VENOUS BLD VENIPUNCTURE: CPT

## 2020-01-08 PROCEDURE — 6370000000 HC RX 637 (ALT 250 FOR IP): Performed by: HOSPITALIST

## 2020-01-08 PROCEDURE — 6360000004 HC RX CONTRAST MEDICATION: Performed by: INTERNAL MEDICINE

## 2020-01-08 PROCEDURE — 87040 BLOOD CULTURE FOR BACTERIA: CPT

## 2020-01-08 PROCEDURE — 80048 BASIC METABOLIC PNL TOTAL CA: CPT

## 2020-01-08 PROCEDURE — 6360000002 HC RX W HCPCS: Performed by: ORTHOPAEDIC SURGERY

## 2020-01-08 PROCEDURE — 7100000000 HC PACU RECOVERY - FIRST 15 MIN: Performed by: ORTHOPAEDIC SURGERY

## 2020-01-08 PROCEDURE — 0QH734Z INSERTION OF INTERNAL FIXATION DEVICE INTO LEFT UPPER FEMUR, PERCUTANEOUS APPROACH: ICD-10-PCS | Performed by: ORTHOPAEDIC SURGERY

## 2020-01-08 PROCEDURE — 1210000000 HC MED SURG R&B

## 2020-01-08 PROCEDURE — 3700000001 HC ADD 15 MINUTES (ANESTHESIA): Performed by: ORTHOPAEDIC SURGERY

## 2020-01-08 PROCEDURE — 84145 PROCALCITONIN (PCT): CPT

## 2020-01-08 PROCEDURE — 2500000003 HC RX 250 WO HCPCS: Performed by: NURSE ANESTHETIST, CERTIFIED REGISTERED

## 2020-01-08 PROCEDURE — 86850 RBC ANTIBODY SCREEN: CPT

## 2020-01-08 PROCEDURE — 3209999900 FLUORO FOR SURGICAL PROCEDURES

## 2020-01-08 PROCEDURE — 83605 ASSAY OF LACTIC ACID: CPT

## 2020-01-08 PROCEDURE — 7100000001 HC PACU RECOVERY - ADDTL 15 MIN: Performed by: ORTHOPAEDIC SURGERY

## 2020-01-08 DEVICE — SCREW BNE L70MM DIA6.5MM THRD L16MM S STL CANN HEX SOCK: Type: IMPLANTABLE DEVICE | Site: HIP | Status: FUNCTIONAL

## 2020-01-08 DEVICE — SCREW BNE L85MM DIA6.5MM THRD L16MM CANC S STL SELF DRL ST: Type: IMPLANTABLE DEVICE | Site: HIP | Status: FUNCTIONAL

## 2020-01-08 RX ORDER — ENALAPRILAT 2.5 MG/2ML
1.25 INJECTION INTRAVENOUS
Status: DISCONTINUED | OUTPATIENT
Start: 2020-01-08 | End: 2020-01-08 | Stop reason: HOSPADM

## 2020-01-08 RX ORDER — SODIUM CHLORIDE 0.9 % (FLUSH) 0.9 %
10 SYRINGE (ML) INJECTION EVERY 12 HOURS SCHEDULED
Status: DISCONTINUED | OUTPATIENT
Start: 2020-01-08 | End: 2020-01-13 | Stop reason: HOSPADM

## 2020-01-08 RX ORDER — LIDOCAINE HYDROCHLORIDE 10 MG/ML
INJECTION, SOLUTION INFILTRATION; PERINEURAL PRN
Status: DISCONTINUED | OUTPATIENT
Start: 2020-01-08 | End: 2020-01-08 | Stop reason: SDUPTHER

## 2020-01-08 RX ORDER — CEFAZOLIN SODIUM 1 G/50ML
1 INJECTION, SOLUTION INTRAVENOUS EVERY 12 HOURS
Status: COMPLETED | OUTPATIENT
Start: 2020-01-09 | End: 2020-01-09

## 2020-01-08 RX ORDER — HYDRALAZINE HYDROCHLORIDE 20 MG/ML
5 INJECTION INTRAMUSCULAR; INTRAVENOUS EVERY 10 MIN PRN
Status: DISCONTINUED | OUTPATIENT
Start: 2020-01-08 | End: 2020-01-08 | Stop reason: HOSPADM

## 2020-01-08 RX ORDER — SODIUM CHLORIDE 0.9 % (FLUSH) 0.9 %
10 SYRINGE (ML) INJECTION EVERY 12 HOURS SCHEDULED
Status: DISCONTINUED | OUTPATIENT
Start: 2020-01-08 | End: 2020-01-08 | Stop reason: HOSPADM

## 2020-01-08 RX ORDER — SENNA AND DOCUSATE SODIUM 50; 8.6 MG/1; MG/1
1 TABLET, FILM COATED ORAL 2 TIMES DAILY
Status: DISCONTINUED | OUTPATIENT
Start: 2020-01-08 | End: 2020-01-13 | Stop reason: HOSPADM

## 2020-01-08 RX ORDER — LABETALOL 20 MG/4 ML (5 MG/ML) INTRAVENOUS SYRINGE
5 EVERY 10 MIN PRN
Status: DISCONTINUED | OUTPATIENT
Start: 2020-01-08 | End: 2020-01-08 | Stop reason: HOSPADM

## 2020-01-08 RX ORDER — EPHEDRINE SULFATE 50 MG/ML
INJECTION, SOLUTION INTRAVENOUS PRN
Status: DISCONTINUED | OUTPATIENT
Start: 2020-01-08 | End: 2020-01-08 | Stop reason: SDUPTHER

## 2020-01-08 RX ORDER — PROMETHAZINE HYDROCHLORIDE 25 MG/ML
6.25 INJECTION, SOLUTION INTRAMUSCULAR; INTRAVENOUS
Status: DISCONTINUED | OUTPATIENT
Start: 2020-01-08 | End: 2020-01-08 | Stop reason: HOSPADM

## 2020-01-08 RX ORDER — MORPHINE SULFATE 4 MG/ML
2 INJECTION, SOLUTION INTRAMUSCULAR; INTRAVENOUS
Status: DISCONTINUED | OUTPATIENT
Start: 2020-01-08 | End: 2020-01-13

## 2020-01-08 RX ORDER — ONDANSETRON 2 MG/ML
INJECTION INTRAMUSCULAR; INTRAVENOUS PRN
Status: DISCONTINUED | OUTPATIENT
Start: 2020-01-08 | End: 2020-01-08 | Stop reason: SDUPTHER

## 2020-01-08 RX ORDER — MORPHINE SULFATE 4 MG/ML
4 INJECTION, SOLUTION INTRAMUSCULAR; INTRAVENOUS EVERY 5 MIN PRN
Status: DISCONTINUED | OUTPATIENT
Start: 2020-01-08 | End: 2020-01-08 | Stop reason: HOSPADM

## 2020-01-08 RX ORDER — FENTANYL CITRATE 50 UG/ML
INJECTION, SOLUTION INTRAMUSCULAR; INTRAVENOUS PRN
Status: DISCONTINUED | OUTPATIENT
Start: 2020-01-08 | End: 2020-01-08 | Stop reason: SDUPTHER

## 2020-01-08 RX ORDER — METOCLOPRAMIDE HYDROCHLORIDE 5 MG/ML
10 INJECTION INTRAMUSCULAR; INTRAVENOUS
Status: DISCONTINUED | OUTPATIENT
Start: 2020-01-08 | End: 2020-01-08 | Stop reason: HOSPADM

## 2020-01-08 RX ORDER — OXYCODONE HYDROCHLORIDE 5 MG/1
5 TABLET ORAL EVERY 4 HOURS PRN
Status: DISCONTINUED | OUTPATIENT
Start: 2020-01-08 | End: 2020-01-13

## 2020-01-08 RX ORDER — SODIUM CHLORIDE 0.9 % (FLUSH) 0.9 %
10 SYRINGE (ML) INJECTION PRN
Status: DISCONTINUED | OUTPATIENT
Start: 2020-01-08 | End: 2020-01-13 | Stop reason: HOSPADM

## 2020-01-08 RX ORDER — DIPHENHYDRAMINE HYDROCHLORIDE 50 MG/ML
12.5 INJECTION INTRAMUSCULAR; INTRAVENOUS
Status: DISCONTINUED | OUTPATIENT
Start: 2020-01-08 | End: 2020-01-08 | Stop reason: HOSPADM

## 2020-01-08 RX ORDER — DOCUSATE SODIUM 100 MG/1
100 CAPSULE, LIQUID FILLED ORAL 2 TIMES DAILY
Status: DISCONTINUED | OUTPATIENT
Start: 2020-01-08 | End: 2020-01-13 | Stop reason: HOSPADM

## 2020-01-08 RX ORDER — MORPHINE SULFATE 4 MG/ML
4 INJECTION, SOLUTION INTRAMUSCULAR; INTRAVENOUS
Status: DISCONTINUED | OUTPATIENT
Start: 2020-01-08 | End: 2020-01-13

## 2020-01-08 RX ORDER — MORPHINE SULFATE 4 MG/ML
2 INJECTION, SOLUTION INTRAMUSCULAR; INTRAVENOUS EVERY 5 MIN PRN
Status: DISCONTINUED | OUTPATIENT
Start: 2020-01-08 | End: 2020-01-08 | Stop reason: HOSPADM

## 2020-01-08 RX ORDER — OXYCODONE HYDROCHLORIDE 5 MG/1
10 TABLET ORAL EVERY 4 HOURS PRN
Status: DISCONTINUED | OUTPATIENT
Start: 2020-01-08 | End: 2020-01-13

## 2020-01-08 RX ORDER — MORPHINE SULFATE 4 MG/ML
1 INJECTION, SOLUTION INTRAMUSCULAR; INTRAVENOUS EVERY 6 HOURS PRN
Status: DISCONTINUED | OUTPATIENT
Start: 2020-01-08 | End: 2020-01-13

## 2020-01-08 RX ORDER — PROPOFOL 10 MG/ML
INJECTION, EMULSION INTRAVENOUS PRN
Status: DISCONTINUED | OUTPATIENT
Start: 2020-01-08 | End: 2020-01-08 | Stop reason: SDUPTHER

## 2020-01-08 RX ORDER — BISACODYL 10 MG
10 SUPPOSITORY, RECTAL RECTAL DAILY PRN
Status: DISCONTINUED | OUTPATIENT
Start: 2020-01-08 | End: 2020-01-13 | Stop reason: HOSPADM

## 2020-01-08 RX ORDER — SODIUM CHLORIDE 0.9 % (FLUSH) 0.9 %
10 SYRINGE (ML) INJECTION PRN
Status: DISCONTINUED | OUTPATIENT
Start: 2020-01-08 | End: 2020-01-08 | Stop reason: HOSPADM

## 2020-01-08 RX ORDER — SODIUM CHLORIDE, SODIUM LACTATE, POTASSIUM CHLORIDE, CALCIUM CHLORIDE 600; 310; 30; 20 MG/100ML; MG/100ML; MG/100ML; MG/100ML
INJECTION, SOLUTION INTRAVENOUS CONTINUOUS
Status: DISCONTINUED | OUTPATIENT
Start: 2020-01-08 | End: 2020-01-08

## 2020-01-08 RX ORDER — ACETAMINOPHEN 325 MG/1
650 TABLET ORAL EVERY 4 HOURS PRN
Status: DISCONTINUED | OUTPATIENT
Start: 2020-01-08 | End: 2020-01-13 | Stop reason: HOSPADM

## 2020-01-08 RX ORDER — OXYCODONE HYDROCHLORIDE AND ACETAMINOPHEN 5; 325 MG/1; MG/1
1 TABLET ORAL EVERY 4 HOURS PRN
Status: DISCONTINUED | OUTPATIENT
Start: 2020-01-08 | End: 2020-01-13 | Stop reason: HOSPADM

## 2020-01-08 RX ORDER — MEPERIDINE HYDROCHLORIDE 50 MG/ML
12.5 INJECTION INTRAMUSCULAR; INTRAVENOUS; SUBCUTANEOUS EVERY 5 MIN PRN
Status: DISCONTINUED | OUTPATIENT
Start: 2020-01-08 | End: 2020-01-08 | Stop reason: HOSPADM

## 2020-01-08 RX ADMIN — FENTANYL CITRATE 25 MCG: 50 INJECTION INTRAMUSCULAR; INTRAVENOUS at 17:45

## 2020-01-08 RX ADMIN — CARVEDILOL 25 MG: 25 TABLET, FILM COATED ORAL at 08:42

## 2020-01-08 RX ADMIN — PROPOFOL 100 MG: 10 INJECTION, EMULSION INTRAVENOUS at 17:42

## 2020-01-08 RX ADMIN — SODIUM CHLORIDE, SODIUM LACTATE, POTASSIUM CHLORIDE, AND CALCIUM CHLORIDE: 600; 310; 30; 20 INJECTION, SOLUTION INTRAVENOUS at 16:59

## 2020-01-08 RX ADMIN — EPHEDRINE SULFATE 10 MG: 50 INJECTION INTRAMUSCULAR; INTRAVENOUS; SUBCUTANEOUS at 18:09

## 2020-01-08 RX ADMIN — ONDANSETRON HYDROCHLORIDE 4 MG: 2 INJECTION, SOLUTION INTRAMUSCULAR; INTRAVENOUS at 18:04

## 2020-01-08 RX ADMIN — LIDOCAINE HYDROCHLORIDE 5 ML: 10 INJECTION, SOLUTION INFILTRATION; PERINEURAL at 17:42

## 2020-01-08 RX ADMIN — Medication 10 ML: at 09:52

## 2020-01-08 RX ADMIN — CARVEDILOL 25 MG: 25 TABLET, FILM COATED ORAL at 16:14

## 2020-01-08 RX ADMIN — CEFEPIME 1 G: 1 INJECTION, POWDER, FOR SOLUTION INTRAMUSCULAR; INTRAVENOUS at 16:14

## 2020-01-08 RX ADMIN — WATER 1 G: 1 INJECTION INTRAMUSCULAR; INTRAVENOUS; SUBCUTANEOUS at 17:53

## 2020-01-08 RX ADMIN — PHENYLEPHRINE HYDROCHLORIDE 160 MCG: 10 INJECTION INTRAVENOUS at 18:00

## 2020-01-08 RX ADMIN — FENTANYL CITRATE 25 MCG: 50 INJECTION INTRAMUSCULAR; INTRAVENOUS at 18:14

## 2020-01-08 RX ADMIN — PHENYLEPHRINE HYDROCHLORIDE 160 MCG: 10 INJECTION INTRAVENOUS at 18:04

## 2020-01-08 RX ADMIN — PHENYLEPHRINE HYDROCHLORIDE 80 MCG: 10 INJECTION INTRAVENOUS at 18:09

## 2020-01-08 RX ADMIN — IOPAMIDOL 90 ML: 755 INJECTION, SOLUTION INTRAVENOUS at 14:13

## 2020-01-08 ASSESSMENT — ENCOUNTER SYMPTOMS
EYES NEGATIVE: 1
RESPIRATORY NEGATIVE: 1
NAUSEA: 0
DIARRHEA: 0
VOMITING: 0
GASTROINTESTINAL NEGATIVE: 1
SHORTNESS OF BREATH: 0

## 2020-01-08 ASSESSMENT — LIFESTYLE VARIABLES: SMOKING_STATUS: 0

## 2020-01-08 NOTE — PROGRESS NOTES
Prabhu Fabian arrived to room # 504. Presented with: Fx Left hip  Mental Status: Patient is oriented, alert, coherent, logical, thought processes intact and able to concentrate and follow conversation. Vitals:    01/07/20 1930   BP: (!) 143/89   Pulse: 111   Resp: 16   Temp: 98.3 °F (36.8 °C)   SpO2: 90%     Patient safety contract and falls prevention contract reviewed with patient Yes. Oriented Patient and Family to room. Call light within reach. Yes.   Needs, issues or concerns expressed at this time: no.      Electronically signed by Sudarshan Zambrano RN on 1/7/2020 at 9:56 PM

## 2020-01-08 NOTE — ANESTHESIA PRE PROCEDURE
Department of Anesthesiology  Preprocedure Note       Name:  [facility]   Age:  80 y.o.  :  1926                                          MRN:  [id number]         Date:  2020      Surgeon: Ramona Yoon):  Aleksandra Delgado MD    Procedure: Procedure(s):  HIP HEMIARTHROPLASTY    Medications prior to admission:   Prior to Admission medications    Medication Sig Start Date End Date Taking? Authorizing Provider   potassium citrate (UROCIT-K) 10 MEQ (1080 MG) extended release tablet Take by mouth 2 times daily    Historical Provider, MD   carvedilol (COREG) 25 MG tablet Take 25 mg by mouth 2 times daily (with meals)    Historical Provider, MD       Current medications:    No current facility-administered medications for this visit. No current outpatient medications on file.      Facility-Administered Medications Ordered in Other Visits   Medication Dose Route Frequency Provider Last Rate Last Dose    [MAR Hold] sodium chloride flush 0.9 % injection 10 mL  10 mL Intravenous 2 times per day Aleksandra Delgado MD   10 mL at 20 0952    [MAR Hold] sodium chloride flush 0.9 % injection 10 mL  10 mL Intravenous PRN Aleksandra Delgado MD        [facility] Hold] ceFAZolin (ANCEF) 1 g in sterile water 10 mL IV syringe  1 g Intravenous On Call to Kavon Dominguez MD        [facility] Hold] oxyCODONE-acetaminophen (PERCOCET) 5-325 MG per tablet 1 tablet  1 tablet Oral Q4H PRN Mariana Sun MD        [facility] Hold] morphine injection 1 mg  1 mg Intravenous Q6H PRN Mariana Sun MD        [facility] Hold] cefepime (MAXIPIME) 1 g in sterile water 10 mL IV syringe  1 g Intravenous Q12H Mariana Sun MD   1 g at 20 1614    [MAR Hold] vancomycin (VANCOCIN) intermittent dosing (placeholder)   Other RX Placeholder Mariana Sun MD        [facility] Hold] vancomycin (VANCOCIN) 750 mg in dextrose 5 % 250 mL IVPB  750 mg Intravenous Q24H Mariana Sun MD        [facility] Hold] carvedilol (COREG) tablet 25 mg  25 mg Oral BID WC Susana Laureano MD   25 mg at 01/08/20 1614    [MAR Hold] ondansetron (ZOFRAN) injection 4 mg  4 mg Intravenous Q6H PRN Susana Laureano MD        [facility] Hold] enoxaparin (LOVENOX) injection 40 mg  40 mg Subcutaneous Daily Susana Laureano MD   40 mg at 01/07/20 2043    [MAR Hold] potassium chloride 10 mEq/100 mL IVPB (Peripheral Line)  10 mEq Intravenous PRN Susana Laureano MD       [facility] Hold] magnesium sulfate 1 g in dextrose 5% 100 mL IVPB  1 g Intravenous PRN Susana Laureano MD       [facility] Hold] magnesium hydroxide (MILK OF MAGNESIA) 400 MG/5ML suspension 30 mL  30 mL Oral Daily PRN Susana Laureano MD           Allergies:  No Known Allergies    Problem List:    Patient Active Problem List   Diagnosis Code    Essential hypertension I10    Hyperlipidemia E78.5    [facility] S72.001A    Acute blood loss as cause of postoperative anemia D62    Acute cystitis without hematuria N30.00    Iron deficiency anemia secondary to inadequate dietary iron intake D50.8    Hip fracture requiring operative repair, left, closed, initial encounter ([facility] S72.002A    Elevated brain natriuretic peptide (BNP) level R79.89    Acute hypoxemic respiratory failure (HCC) J96.01    D-dimer, elevated R79.89    Sepsis ([facility] A41.9    Hyperkalemia E87.5       Past Medical History:        Diagnosis Date    Closed right hip fracture, initial encounter ([facility]     Hyperlipidemia     Hypertension        Past Surgical History:        Procedure Laterality Date    [facility] HIP SOCKET Right 1/18/2018    HIP HEMIARTHROPLASTY performed by Job Adkins MD at [address] THYROIDECTOMY         Social History:    Social History     Tobacco Use    Smoking status: Never Smoker    Smokeless tobacco: Never Used   Substance Use Topics    Alcohol use: No                                Counseling given: Not Answered      Vital Signs (Current):    There were no vitals filed for this visit.                                           BP Readings from Last 3 Encounters:   01/08/20 97/64   10/05/19 (!) 150/89   08/26/19 (!) 148/85       NPO Status:                                                                                 BMI:   Wt Readings from Last 3 Encounters:   01/07/20 110 lb (49.9 kg)   10/05/19 110 lb (49.9 kg)   08/26/19 110 lb (49.9 kg)     There is no height or weight on file to calculate BMI.    CBC:   Lab Results   Component Value Date    WBC 16.4 01/08/2020    RBC 3.24 01/08/2020    HGB 11.5 01/08/2020    HCT 36.2 01/08/2020    .7 01/08/2020    RDW 11.6 01/08/2020     01/08/2020       CMP:   Lab Results   Component Value Date     01/08/2020    K 5.1 01/08/2020     01/08/2020    CO2 23 01/08/2020    BUN 24 01/08/2020    CREATININE 0.9 01/08/2020    LABGLOM 58 01/08/2020    GLUCOSE 133 01/08/2020    PROT 6.8 08/26/2019    CALCIUM 9.2 01/08/2020    BILITOT 0.3 08/26/2019    ALKPHOS 84 08/26/2019    AST 11 08/26/2019    ALT <5 08/26/2019       POC Tests: No results for input(s): POCGLU, POCNA, POCK, POCCL, POCBUN, POCHEMO, POCHCT in the last 72 hours.     Coags:   Lab Results   Component Value Date    PROTIME 13.7 01/07/2020    INR 1.11 01/07/2020    APTT 28.1 01/07/2020       HCG (If Applicable): No results found for: PREGTESTUR, PREGSERUM, HCG, HCGQUANT     ABGs:   Lab Results   Component Value Date    PHART 7.420 01/07/2020    PO2ART 45.0 01/07/2020    BNE9WZV 36.0 01/07/2020    MBL3HZR 23.4 01/07/2020    BEART -0.7 01/07/2020    Q5YPCLXR 82.2 01/07/2020        Type & Screen (If Applicable):  No results found for: LABABO, [address]    Anesthesia Evaluation  Patient summary reviewed and Nursing notes reviewed no history of anesthetic complications:   Airway: Mallampati: Unable to assess / NA  TM distance: >3 FB   Neck ROM: limited  Mouth opening: > = 3 FB Dental:          Pulmonary:normal exam        (-) not a current smoker          Patient did not smoke on day of surgery. Cardiovascular:    (+) hypertension:, valvular problems/murmurs: AS, murmur,     (-) past MI, CAD, CABG/stent and dysrhythmias    ECG reviewed               Beta Blocker:  Dose within 24 Hrs         Neuro/Psych:   Negative Neuro/Psych ROS              GI/Hepatic/Renal: Neg GI/Hepatic/Renal ROS            Endo/Other: Negative Endo/Other ROS   (+) blood dyscrasia: anemia, arthritis:., .                 Abdominal:           Vascular: negative vascular ROS. Anesthesia Plan      general     ASA 3     (Decadron/Zofran Intraop)  Induction: intravenous. MIPS: Postoperative opioids intended and Prophylactic antiemetics administered. Anesthetic plan and risks discussed with patient.                       Niharika Smith MD   1/8/2020

## 2020-01-08 NOTE — H&P
HISTORY AND PHYSICAL             Date: 1/7/2020        Patient Name: Felisa Sanchez     YOB: 1926      Age:  80 y.o. Chief Complaint     Chief Complaint   Patient presents with    Hip Pain     Left hip pain after fall. History Obtained From   patient    History of Present Illness     24-year-old lady with a history of hyperlipidemia, hypertension presented to the hospital with concerns of left hip pain status post fall. Per patient's son, patient was working in the kitchen trying to place trash in the trash can and tripped over her shoelace, fell on her left side and still experiencing some pain. Unable to get up from the floor she was helped into the car and brought to the hospital.  Prior to episode patient was at a usual state of health with no complaints of chest pain or shortness of breath nausea vomiting abdominal pain. In the ED vitals on admission was noted to be blood pressure of 183/95, heart rate of 98 respiratory rate 20 and afebrile. Labs were obtained, electrolytes unremarkable, white blood cell noted to be 19.4 but no source of infection. INR 1.11, UA was negative, chest x-ray with no concerns of consolidation. X-ray of the hip was obtained concerning for subcapital left femoral neck fracture. Orthopedic was consulted in the ED who stated admitted to medicine and OR tomorrow morning. Past Medical History     Past Medical History:   Diagnosis Date    Closed [facility] fracture[facility] encounter ([facility]     Hyperlipidemia     Hypertension         Past Surgical History     Past Surgical History:   Procedure Laterality Date    [facility] HIP SOCKET Right 1/18/2018    HIP HEMIARTHROPLASTY performed by Roberta Brady MD at [address] [place] [address]          Medications Prior to Admission     Prior to Admission medications    Medication Sig Start Date End Date Taking?  Authorizing Provider   potassium citrate (UROCIT-K) 10 MEQ (1080 MG) extended release tablet Take by mouth 2 times daily    Historical Provider, MD   carvedilol (COREG) 25 MG tablet Take 25 mg by mouth 2 times daily (with meals)    Historical Provider, MD        Allergies   Patient has no known allergies. Social History     Social History     Tobacco History     Smoking Status  Never Smoker    Smokeless Tobacco Use  Never Used          Alcohol History     Alcohol Use Status  No          Drug Use     Drug Use Status  No          Sexual Activity     Sexually Active  Not Asked                Family History   No family history on file. None contributory. Review of Systems   Review of Systems  16 point system reviewed and negative except as stated above. Physical Exam   /82   Pulse 100   Temp 98 °F (36.7 °C) (Oral)   Resp 20   Ht 5' (1.524 m)   Wt 110 lb (49.9 kg)   SpO2 90%   BMI 21.48 kg/m²     Physical Exam  General: frail lady, no acute distress lying comfortably in bed. HEENT: Atraumatic normocephalic, range of motion normal, no JVD, no tracheal deviation noted. Cardiac: Normal S1-S2, diffuse pronounced systolic murmur  Respiratory: Limited examination due to patient positioning but adequate air entry with mild rhonchi. Abdomen: Soft, positive bowel sounds in all quadrants, no distention, nontender to palpation, no organomegaly noted, rebound noted. Extremities:+ tenderness with movement of left leg, height discrepancy in the left leg compared to right, no edema  Psych: Affect normal and good eye contact.       Labs      Recent Results (from the past 24 hour(s))   Basic Metabolic Panel w/ Reflex to MG    Collection Time: 01/07/20  4:26 PM   Result Value Ref Range    Sodium 139 136 - 145 mmol/L    Potassium reflex Magnesium 3.8 3.5 - 5.0 mmol/L    Chloride 102 98 - 111 mmol/L    CO2 20 (L) 22 - 29 mmol/L    Anion Gap 17 7 - 19 mmol/L    Glucose 179 (H) 74 - 109 mg/dL    BUN 13 8 - 23 mg/dL    CREATININE 0.6 0.5 - 0.9 mg/dL    GFR Non-African American >60 >60    Calcium 9.3 The heart is generous. Mild pulmonary venous hypertension considered. There is no parenchymal infiltrates or evidence of overt heart failure. Bony structures are intact without acute osseous abdomen on his. Large hiatal hernia again observed. Generous heart size with diffuse mild perihilar interstitial prominence without parenchymal infiltrates or evidence of overt heart failure. Signed by Dr Morenita Wing on 1/7/2020 5:41 PM    Xr Hip 2-3 Vw W Pelvis Left    Result Date: 1/7/2020  EXAMINATION:  XR HIP 2-3 VW W PELVIS LEFT  1/7/2020 5:46 PM HISTORY: Left hip pain post fall COMPARISON: 1/8/2018 pelvis radiograph TECHNIQUE: 3 views: AP pelvis, AP and lateral left hip FINDINGS: There is a subcapital mildly impacted left femur neck fracture. There is diffuse osteoporosis. There are changes from right hip arthroplasty. No additional fractures observed. 1. Subcapital left femur neck fracture. Signed by Dr Morenita Wing on 1/7/2020 5:48 PM      Assessment      Hospital Problems           Last Modified POA    Hip fracture requiring operative repair, left, closed, initial encounter ([facility] 1/7/2020 Yes          Plan       80-year-old lady with a history of hyperlipidemia, hypertension presented to the hospital with concerns of left hip pain status post fall. Left femoral neck fracture status post mechanical fall  Orthopedics consulted by emergency room physician and state OR tomorrow admit to medicine  Pain management with Dilaudid 0.5 mg every 3 as needed  Monitor on telemetry  Given patient's pronounced systolic murmur would recommend cardiology clearance before proceeding to surgery; hence cardiology consulted    History of hypertension  Continue Coreg    Hyperglycemia with no history of diabetes   continue to monitor for now and initiate sliding scale as needed. Leukocytosis of 19.4  No current source of infection UA negative and no consolidation on chest x-ray.   Likely reactive secondary to fall and fracture. Repeat CBC in the a.m. Code: Full as anticipated surgery  DVT prophylaxis: Enoxaparin  Diet: General, n.p.o. after midnight  Disposition pending above work-up.         Consultations Ordered:  IP CONSULT TO ORTHOPEDIC SURGERY  IP CONSULT TO HOSPITALIST    Electronically signed by Char Beavers MD on 1/7/20 at 7:04 PM

## 2020-01-08 NOTE — PROGRESS NOTES
Pharmacy Renal Adjustment    Andria Garay is a 80 y.o. female. Pharmacy has renally adjusted medications per protocol. Recent Labs     01/08/20  0336 01/08/20  0835   BUN 20 24*       Recent Labs     01/08/20  0336 01/08/20  0835   CREATININE 0.8 0.9       Estimated Creatinine Clearance: 28 mL/min (based on SCr of 0.9 mg/dL).     Height:   Ht Readings from Last 1 Encounters:   01/07/20 5' (1.524 m)     Weight:  Wt Readings from Last 1 Encounters:   01/07/20 110 lb (49.9 kg)       CKD stage:         Baseline SCr: 0.9    Plan: Adjust the following medications based on renal function:          Cefepime 1 gm iv every 12 hours    Electronically signed by Loraine Hashimoto, [facility] on 1/8/2020 at 4:07 PM

## 2020-01-08 NOTE — OP NOTE
Patient Name: Roddy Moore  MRN: [id number]  : 1926    DATE of SURGERY: 2020    SURGEON: Polly Ramos MD    ASSISTANT: NONE    PREOPERATIVE DIAGNOSIS: Acute traumatic valgus impacted minimally displaced subcapital fracture of the neck of the Left femur, initial encounter for closed fracture    POSTOPERATIVE DIAGNOSIS: Same    PROCEDURE PERFORMED: Percutaneous screw fixation of Left minimally displaced subcapital femoral neck fracture      IMPLANTS: Synthes 6.5 cannulated screws    ANESTHESIA USED: General endotrachial anesthesia    OPERATIVE INDICATIONS: 80 y.o. YO female status post fall with the above named diagnosis. Surgical indications include fracture displacement, stabilization of fracture, and mobilization of the patient. Risks include, but are not limited to, anesthesia, bleeding, infection, pain, damage to local structures, need for further surgery, malunion, nonunion, fracture displacement, failure of hardware, intraoperative death. Risks, benefits, and alternative were discussed and the patient wishes to proceed with surgery. ESTIMATED BLOOD LOSS: < 10 mL    DRAINS: none     COMPLICATIONS: none    SPECIMENS: none    FINDINGS: see op note    PROCEDURE in DETAIL:  The patient was seen in the preoperative holding room, the informed consent was reviewed and signed, and the correct operative extremity marked with the patients agreement. The patient was transported to the operating room, where a timeout was performed identifying the correct patient and operative site. Perioperative antibiotics were administered prior to incision. C-arm images were utilized noting the fracture to remain minimally displaced and therefore perc screws were chosen.     After a sterile prep and drape, threaded 3.2 mm k-wires were placed in strategic fashion from the lateral aspect of the proximal femur into the femoral head in an inverted triangle fashion with the inferior screw being placed centrally and the superior screws being placed anteriorly and posteriorly. K-wires were measured and the path of the screws were drilled. The appropriately sized partially threaded 6.5 cannulated screws were inserted gaining excellent purchase. C-arm images verified all implants were well contained within the femoral head as an approach-withdraw technique was utilized. The incisions were irrigated, closed in layers and the skin was sealed with a Dermabond adhesive skin dressing. A sterile dressing was placed, the patient awakened from anesthesia, transported to the PACU in stable condition.       POSTOPERATIVE PLAN:  1) TTWB LLE x 6 weeks  2) DVT prophylaxis x 6 weeks  3) PT/OT

## 2020-01-08 NOTE — PROGRESS NOTES
[facility]    Patient:  Katharine Dickey  YOB: 1926  Date of Service: 1/8/2020  MRN: [id number]   Acct: [de-identified]   Primary Care Physician: NAOMI Sanchez NP  Advance Directive: Full Code  Admit Date: 1/7/2020       Hospital Day: 1      CHIEF COMPLAINT:     Chief Complaint   Patient presents with    Hip Pain     Left hip pain after fall. 1/8/2020 4:13 PM  Subjective / Interval History:   No acute overnight event reported. Patient endorses overall improvement. Left hip pain currently improved/controlled. Laying comfortably in bed, in no acute distress. Denies any other acute complaints. Cumulative Hospital History:   Ms. Izzy Moore, a 80-year-old female, hx of HLD, HTN, presenting to the ED (01/07/2020), on account of acute onset of left hip pain, after a reported mechanical fall. Leukocytosis noted on initial presentation, with a WBC of 19.4, however with no obvious source of infection. X-ray of the hip, reporting concern for subcapital left femoral neck fracture. Orthopedics consulted from ED. Patient was subsequently admitted for further work-up and management. Review of Systems:   Review of Systems  ROS: 14 point review of systems is negative except as specifically addressed above.     Diet NPO Effective Now    Intake/Output Summary (Last 24 hours) at 1/8/2020 1613  Last data filed at 1/8/2020 1444  Gross per 24 hour   Intake 0 ml   Output 475 ml   Net -475 ml       Medications:  Current Facility-Administered Medications   Medication Dose Route Frequency Provider Last Rate Last Dose    sodium chloride flush 0.9 % injection 10 mL  10 mL Intravenous 2 times per day Yaya Esparza MD   10 mL at 01/08/20 8327    sodium chloride flush 0.9 % injection 10 mL  10 mL Intravenous PRN Yaya Esparza MD        ceFAZolin (ANCEF) 1 g in sterile water 10 mL IV syringe  1 g Intravenous On Call to Ang Thomas MD        oxyCODONE-acetaminophen (PERCOCET) 5-325 MG per tablet 1 tablet  1 tablet Oral Q4H PRN Leida Hidalgo MD        morphine injection 1 mg  1 mg Intravenous Q6H PRN Leida Hidalgo MD        cefepime (MAXIPIME) 1 g in sterile water 10 mL IV syringe  1 g Intravenous Q12H Leida Hidalgo MD        vancomycin [facility]) intermittent dosing (placeholder)   Other RX Placeholder Leida Hidalgo MD        vancomycin (VANCOCIN) 750 mg in dextrose 5 % 250 mL IVPB  750 mg Intravenous Q24H Leida Hidalgo MD        carvedilol (COREG) tablet 25 mg  25 mg Oral BID  Ameena Hendrickson MD   25 mg at 01/08/20 0842    ondansetron (ZOFRAN) injection 4 mg  4 mg Intravenous Q6H PRN Ameena Hendrickson MD        enoxaparin (LOVENOX) injection 40 mg  40 mg Subcutaneous Daily Ameena Hendrickson MD   40 mg at 01/07/20 2043    potassium chloride 10 mEq/100 mL IVPB (Peripheral Line)  10 mEq Intravenous PRN Ameena Hendrickson MD        magnesium sulfate 1 g in dextrose 5% 100 mL IVPB  1 g Intravenous PRN Ameena Hendrickson MD        magnesium hydroxide (MILK OF MAGNESIA) 400 MG/5ML suspension 30 mL  30 mL Oral Daily PRN Ameena Hendrickson MD               sodium chloride flush  10 mL Intravenous 2 times per day    ceFAZolin (ANCEF) IVPB  1 g Intravenous On Call to OR    cefepime  1 g Intravenous Q12H    vancomycin (VANCOCIN) intermittent dosing (placeholder)   Other RX Placeholder    vancomycin  750 mg Intravenous Q24H    carvedilol  25 mg Oral BID     enoxaparin  40 mg Subcutaneous Daily     sodium chloride flush, oxyCODONE-acetaminophen, morphine, ondansetron, potassium chloride, magnesium sulfate, magnesium hydroxide  Diet NPO Effective Now       Labs:   CBC with DIFF:  Recent Labs     01/07/20  1626 01/08/20  0336 01/08/20  0835   WBC 19.4* 17.5* 16.4*   RBC 3.60* 3.14* 3.24*   HGB 12.9 11.1* 11.5*   HCT 40.6 35.0* 36.2*   .8* 111.5* 111.7*   MCH 35.8* 35.4* 35.5*   MCHC 31.8* 31.7* 31.8*   RDW 11.4* 11.7 11.6    130 135   MPV 12.5* 12.2 12.2   NEUTOPHILPCT 92.4*  --   --    LYMPHOPCT 4.7*  --   --    MONOPCT 1.7  --   --    EOSRELPCT 0.2  --   --    BASOPCT 0.2  --   --    NEUTROABS 18.0*  --   --    LYMPHSABS 0.9*  --   --    MONOSABS 0.30  --   --    EOSABS 0.00  --   --    BASOSABS 0.00  --   --        CMP/BMP:  Recent Labs     01/07/20  1626 01/07/20  1637 01/08/20  0336 01/08/20  0835     --  139 140   K 3.8 3.2 4.4 5.1*     --  102 103   CO2 20*  --  25 23   ANIONGAP 17  --  12 14   GLUCOSE 179*  --  135* 133*   BUN 13  --  20 24*   CREATININE 0.6  --  0.8 0.9   LABGLOM >60  --  >60 58*   CALCIUM 9.3  --  8.8 9.2         CRP:  No results for input(s): CRP in the last 72 hours. Sed Rate:  No results for input(s): SEDRATE in the last 72 hours. HgBA1c:  No components found for: HGBA1C  FLP:  No results found for: TRIG, HDL, LDLCALC, LDLDIRECT, LABVLDL  TSH:    Lab Results   Component Value Date    TSH 2.160 12/17/2017     Troponin T:   Recent Labs     01/07/20  1626   TROPONINI <0.01     Pro-BNP: No results for input(s): BNP in the last 72 hours. INR:   Recent Labs     01/07/20  1626   INR 1.11     ABGs:   Lab Results   Component Value Date    PHART 7.420 01/07/2020    PO2ART 45.0 01/07/2020    CDC9AYR 36.0 01/07/2020     UA:  Recent Labs     01/07/20  1806   COLORU YELLOW   PHUR 6.0   WBCUA 4   RBCUA 3   BACTERIA NEGATIVE   CLARITYU Clear   SPECGRAV 1.013   LEUKOCYTESUR SMALL*   UROBILINOGEN 0.2   BILIRUBINUR Negative   BLOODU Negative   GLUCOSEU Negative         Culture Results:    No results for input(s): CXSURG in the last 720 hours. Blood Culture Recent: No results for input(s): BC in the last 720 hours. Cultures:   No results for input(s): CULTURE in the last 72 hours. No results for input(s): BC, Bertrand Pérez in the last 72 hours. No results for input(s): CXSURG in the last 72 hours. No results for input(s): MG, PHOS in the last 72 hours.   No results for input(s): AST, ALT, ALB, BILITOT, ALKPHOS, ALB in the last 72 hours. RAD:   Ct Head Wo Contrast    Result Date: 1/7/2020  CT HEAD WO CONTRAST 1/7/2020 4:00 PM History: Head pain post fall Comparisons: 10/5/2019 head CT Technique: Radiation dose equals  mGy cm. AUTOMATED EXPOSURE CONTROL dose reduction technique was implemented CT evaluation of the head without intravenous contrast. 5 mm transaxial images were obtained. 2-D sagittal and coronal reconstruction images were generated. Findings: There is central and cortical atrophy. There is low attenuation in the periventricular and subcortical white matter, chronic ischemic changes. There is no intra or extra-axial hemorrhage. There is no mass effect or midline shift. There is no hydrocephalus. There is no skull fracture acute calvarial abnormality. Paranasal sinuses imaged incompletely are clear. CT appearance of the head is unchanged. Impression: 1. No CT evidence of an acute intracranial process. Signed by Dr Sophy Landa on 1/7/2020 5:29 PM    Xr Chest Portable    Result Date: 1/7/2020  XR CHEST PORTABLE 1/7/2020 3:45 PM HISTORY:   Fall  Single view. COMPARISONS:  8/29/2019, 1/18/2018 and 12/17/2017 FINDINGS: There is mild diffuse perihilar interstitial prominence. The heart is generous. Mild pulmonary venous hypertension considered. There is no parenchymal infiltrates or evidence of overt heart failure. Bony structures are intact without acute osseous abdomen on his. Large hiatal hernia again observed. Generous heart size with diffuse mild perihilar interstitial prominence without parenchymal infiltrates or evidence of overt heart failure.  Signed by Dr Sophy Landa on 1/7/2020 5:41 PM    Xr Hip 2-3 Vw W Pelvis Left    Result Date: 1/7/2020  EXAMINATION:  XR HIP 2-3 VW W PELVIS LEFT  1/7/2020 5:46 PM HISTORY: Left hip pain post fall COMPARISON: 1/8/2018 pelvis radiograph TECHNIQUE: 3 views: AP pelvis, AP and lateral left hip FINDINGS: There is a subcapital mildly equal, round, and reactive to light. Neck:      Musculoskeletal: Normal range of motion and neck supple. No neck rigidity or muscular tenderness. Cardiovascular:      Rate and Rhythm: Normal rate and regular rhythm. Pulses: Normal pulses. Heart sounds: Murmur present. Pulmonary:      Effort: Pulmonary effort is normal. No respiratory distress. Breath sounds: Normal breath sounds. No wheezing or rales. Chest:      Chest wall: No tenderness. Abdominal:      General: Bowel sounds are normal. There is no distension. Palpations: Abdomen is soft. Tenderness: There is no tenderness. Musculoskeletal:         General: No swelling or tenderness. Right lower leg: No edema. Left lower leg: No edema. Comments: Decreased range of motion in left hip due to pain. Intact ROM in bilateral upper extremity, as well as right lower extremity. Skin:     General: Skin is warm and dry. Capillary Refill: Capillary refill takes less than 2 seconds. Coloration: Skin is not jaundiced. Neurological:      General: No focal deficit present. Mental Status: She is alert. Cranial Nerves: No cranial nerve deficit. Sensory: No sensory deficit. Psychiatric:         Mood and Affect: Mood normal.         Behavior: Behavior normal.         Thought Content:  Thought content normal.         Judgment: Judgment normal.           Assessment/plan:         Hospital Problems           Last Modified POA    * (Principal) Hip fracture requiring operative repair, left, closed, initial encounter ([facility] [address] 1/8/2020 Yes    Elevated brain natriuretic peptide (BNP) level 1/8/2020 Yes    Acute hypoxemic respiratory failure ([facility] [address] 1/8/2020 Yes    D-dimer, elevated 1/8/2020 Yes    Sepsis ([facility] [address] 1/8/2020 Yes    Hyperkalemia 1/8/2020 Yes    Essential hypertension 1/8/2020 Yes    Hyperlipidemia 1/8/2020 Yes            Hip fracture requiring operative repair, left, closed, initial encounter tbd    Time Spent is 40 mins in the examination, evaluation, counseling and review of medications, assessment and plan.      Electronically signed by   Shantanu Duque MD, MPH,   Internal Medicine Hospitalist   1/8/2020 4:13 PM

## 2020-01-08 NOTE — CONSULTS
Orthopaedic Inpatient Consultation    NAME:  Elvira Fermin   : 1926  MRN: [id number]    2020  4:19 PM    Requesting Physician: Linda Bermudez COMPLAINT:  left hip fracture    HISTORY OF PRESENT ILLNESS:   The patient is a 80 y.o. female who fell onto her left side resulting in a valgus impacted left femoral neck fracture. She states that she was able to ambulate following the fall but due to persistent pain was not eventually evaluated. Pain is located in the left hip/groin and rated a 2/5, constant, dull, worse with movement, better with rest and medication. There are no associated symptoms. Past Medical History:        Diagnosis Date    Closed right hip fracture, initial encounter ([facility] [address]     Hyperlipidemia     Hypertension        Past Surgical History:        Procedure Laterality Date    [facility] HIP SOCKET Right 2018    HIP HEMIARTHROPLASTY performed by Lissette Sprague MD at [address] [place] [address]         Current Medications:   Prior to Admission medications    Medication Sig Start Date End Date Taking? Authorizing Provider   potassium citrate (UROCIT-K) 10 MEQ (1080 MG) extended release tablet Take by mouth 2 times daily   Yes Historical Provider, MD   carvedilol (COREG) 25 MG tablet Take 25 mg by mouth 2 times daily (with meals)   Yes Historical Provider, MD       Allergies:  Patient has no known allergies. Social History:   Social History     Socioeconomic History    Marital status:       Spouse name: Not on file    Number of children: Not on file    Years of education: Not on file    Highest education level: Not on file   Occupational History    Not on file   Social Needs    Financial resource strain: Not on file    Food insecurity:     Worry: Not on file     Inability: Not on file    Transportation needs:     Medical: Not on file     Non-medical: Not on file   Tobacco Use    Smoking status: Never Smoker    Smokeless tobacco: Never Used Substance and Sexual Activity    Alcohol use: No    Drug use: No    Sexual activity: Not on file   Lifestyle    Physical activity:     Days per week: Not on file     Minutes per session: Not on file    Stress: Not on file   Relationships    Social connections:     Talks on phone: Not on file     Gets together: Not on file     Attends Protestant service: Not on file     Active member of club or organization: Not on file     Attends meetings of clubs or organizations: Not on file     Relationship status: Not on file    Intimate partner violence:     Fear of current or ex partner: Not on file     Emotionally abused: Not on file     Physically abused: Not on file     Forced sexual activity: Not on file   Other Topics Concern    Not on file   Social History Narrative    Not on file       Family History:   No family history on file. REVIEW OF SYSTEMS:  14 point review of systems has been reviewed from the patient's emergency room visit, reviewed with the patient on today's date with no new changes. PHYSICAL EXAM:      Physical Examination:  Vitals:   Vitals:    01/07/20 1930 01/07/20 2230 01/08/20 0220 01/08/20 0634   BP: (!) 143/89 117/82 100/69 109/67   Pulse: 111 108 68 110   Resp: 16 14 16 20   Temp: 98.3 °F (36.8 °C) 98 °F (36.7 °C) 97.7 °F (36.5 °C) 99.4 °F (37.4 °C)   TempSrc: Temporal Temporal Temporal Temporal   SpO2: 90% 91% 90% 91%   Weight:       Height:         General:  Appears stated age, no distress. Orientation:  Alert and oriented to time, place, and person. Mood and Affect:  Cooperative and pleasant. Gait:  Resting comfortably in bed. Cardiovascular:  Symmetric 1-2 plus pulses in upper and lower extremities. Lymph:  No cervical or inguinal lymphadenopathy noted. Sensation:  Grossly intact to light touch. DTR:  Normal, no pathologic reflexes.   Coordination/balance:  Normal    Musculoskeletal:  Right upper extremity exam:  There is no tenderness to palpation about the shoulder, elbow, wrist or hand. Unrestricted full function motion is present. Stability is normal with provocative tests, 5/5 strength, and skin is normal.      Left upper extremity exam:  There is no tenderness to palpation about the shoulder, elbow, wrist or hand. Unrestricted full function motion is present. Stability is normal with provocative tests, 5/5 strength, and skin is normal.     Right lower extremity exam:  There is no tenderness to palpation about the knee, ankle or foot, but there is obvious pain about the left hip. There is no obvious deformity. There is no obvious shortening or external rotation noted. The patient does have mild discomfort with supine logroll. The surrounding muscle compartments are soft and compressible and the overlying skin is intact. Left lower extremity exam:  There is no tenderness to palpation about the hip, knee, ankle or foot. Unrestricted full function motion is present.   Stability is normal with provocative tests, 5/5 strength, and skin is normal.      DATA:    CBC with Differential:    Lab Results   Component Value Date    WBC 17.5 01/08/2020    RBC 3.14 01/08/2020    HGB 11.1 01/08/2020    HCT 35.0 01/08/2020     01/08/2020    .5 01/08/2020    MCH 35.4 01/08/2020    MCHC 31.7 01/08/2020    RDW 11.7 01/08/2020    LYMPHOPCT 4.7 01/07/2020    MONOPCT 1.7 01/07/2020    BASOPCT 0.2 01/07/2020    MONOSABS 0.30 01/07/2020    LYMPHSABS 0.9 01/07/2020    EOSABS 0.00 01/07/2020    BASOSABS 0.00 01/07/2020     CMP:    Lab Results   Component Value Date     01/08/2020    K 4.4 01/08/2020     01/08/2020    CO2 25 01/08/2020    BUN 20 01/08/2020    CREATININE 0.8 01/08/2020    LABGLOM >60 01/08/2020    GLUCOSE 135 01/08/2020    PROT 6.8 08/26/2019    CALCIUM 8.8 01/08/2020    BILITOT 0.3 08/26/2019    ALKPHOS 84 08/26/2019    AST 11 08/26/2019    ALT <5 08/26/2019     BMP:    Lab Results   Component Value Date     01/08/2020    K 4.4 01/08/2020 W Pelvis Left    Result Date: 1/7/2020  EXAMINATION:  XR HIP 2-3 VW W PELVIS LEFT  1/7/2020 5:46 PM HISTORY: Left hip pain post fall COMPARISON: 1/8/2018 pelvis radiograph TECHNIQUE: 3 views: AP pelvis, AP and lateral left hip FINDINGS: There is a subcapital mildly impacted left femur neck fracture. There is diffuse osteoporosis. There are changes from right hip arthroplasty. No additional fractures observed. 1. Subcapital left femur neck fracture.  Signed by Dr Zeinab Aparicio on 1/7/2020 5:48 PM    --------------------------------------------------------------------------------------------------------------------    Assessment: Valgus impacted left femoral neck fracture    Plan:  1) to OR for percutaneous stabilization versus hemiarthroplasty of the left hip depending on patient's family's wishes - we discussed risks, benefits, and alternatives and patient wishes to proceed  2) Admit postop for pain control, PT/OT  3) n.p.o. presently     Electronically signed by Polly Ramos MD on 1/8/2020 at 7:16 AM

## 2020-01-08 NOTE — PROGRESS NOTES
4 Eyes Skin Assessment    Julieta Meyer is being assessed upon: Admission    I agree that I, Aranza Brice, along with Saran Frazier RN (either 2 RN's or 1 LPN and 1 RN) have performed a thorough Head to Toe Skin Assessment on the patient. ALL assessment sites listed below have been assessed. Areas assessed by both nurses:     [x]   Head, Face, and Ears   [x]   Shoulders, Back, and Chest  [x]   Arms, Elbows, and Hands   [x]   Coccyx, Sacrum, and Ischium  [x]   Legs, Feet, and Heels    Does the Patient have Skin Breakdown?  No    Jaden Prevention initiated: Yes  Wound Care Orders initiated: No    WOC nurse consulted for Pressure Injury (Stage 3,4, Unstageable, DTI, NWPT, and Complex wounds) and New or Established Ostomies: NA        Primary Nurse eSignature: Madison Caceres RN on 1/7/2020 at 9:57 PM      Co-Signer eSignature: Electronically signed by Saarn Frazier RN on 1/7/20 at 10:00 PM

## 2020-01-08 NOTE — CONSULTS
Zeinab Aparicio on 1/7/2020 5:29 PM    Xr Chest Portable    Result Date: 1/7/2020  XR CHEST PORTABLE 1/7/2020 3:45 PM HISTORY:   Fall  Single view. COMPARISONS:  8/29/2019, 1/18/2018 and 12/17/2017 FINDINGS: There is mild diffuse perihilar interstitial prominence. The heart is generous. Mild pulmonary venous hypertension considered. There is no parenchymal infiltrates or evidence of overt heart failure. Bony structures are intact without acute osseous abdomen on his. Large hiatal hernia again observed. Generous heart size with diffuse mild perihilar interstitial prominence without parenchymal infiltrates or evidence of overt heart failure. Signed by Dr Zeinab Aparicio on 1/7/2020 5:41 PM    Xr Hip 2-3 Vw W Pelvis Left    Result Date: 1/7/2020  EXAMINATION:  XR HIP 2-3 VW W PELVIS LEFT  1/7/2020 5:46 PM HISTORY: Left hip pain post fall COMPARISON: 1/8/2018 pelvis radiograph TECHNIQUE: 3 views: AP pelvis, AP and lateral left hip FINDINGS: There is a subcapital mildly impacted left femur neck fracture. There is diffuse osteoporosis. There are changes from right hip arthroplasty. No additional fractures observed. 1. Subcapital left femur neck fracture. Signed by Dr Zeinab Aparicio on 1/7/2020 5:48 PM      Assessment:  1. Status post fall 1/7/2019 with subcapital left femur neck fracture  2. Systolic murmur as described  3. Complaints of mild dyspnea  4. Hypertension  5. Hyperlipidemia  6. Iron deficiency anemia  7. No clinical angina      Recommendations:  1. Check a baseline proBNP level  2.  Check an echocardiogram further comments to follow

## 2020-01-09 LAB
ANION GAP SERPL CALCULATED.3IONS-SCNC: 14 MMOL/L (ref 7–19)
BUN BLDV-MCNC: 30 MG/DL (ref 8–23)
CALCIUM SERPL-MCNC: 8.4 MG/DL (ref 8.2–9.6)
CHLORIDE BLD-SCNC: 103 MMOL/L (ref 98–111)
CO2: 24 MMOL/L (ref 22–29)
CREAT SERPL-MCNC: 0.8 MG/DL (ref 0.5–0.9)
GFR NON-AFRICAN AMERICAN: >60
GLUCOSE BLD-MCNC: 113 MG/DL (ref 74–109)
HCT VFR BLD CALC: 32.4 % (ref 37–47)
HEMOGLOBIN: 10.4 G/DL (ref 12–16)
POTASSIUM SERPL-SCNC: 4.3 MMOL/L (ref 3.5–5)
SODIUM BLD-SCNC: 141 MMOL/L (ref 136–145)
URINE CULTURE, ROUTINE: NORMAL

## 2020-01-09 PROCEDURE — 51701 INSERT BLADDER CATHETER: CPT

## 2020-01-09 PROCEDURE — 1210000000 HC MED SURG R&B

## 2020-01-09 PROCEDURE — 85018 HEMOGLOBIN: CPT

## 2020-01-09 PROCEDURE — 87040 BLOOD CULTURE FOR BACTERIA: CPT

## 2020-01-09 PROCEDURE — 36415 COLL VENOUS BLD VENIPUNCTURE: CPT

## 2020-01-09 PROCEDURE — 97530 THERAPEUTIC ACTIVITIES: CPT

## 2020-01-09 PROCEDURE — 6370000000 HC RX 637 (ALT 250 FOR IP): Performed by: ORTHOPAEDIC SURGERY

## 2020-01-09 PROCEDURE — 2700000000 HC OXYGEN THERAPY PER DAY

## 2020-01-09 PROCEDURE — 97535 SELF CARE MNGMENT TRAINING: CPT

## 2020-01-09 PROCEDURE — 6360000002 HC RX W HCPCS: Performed by: ORTHOPAEDIC SURGERY

## 2020-01-09 PROCEDURE — 97166 OT EVAL MOD COMPLEX 45 MIN: CPT

## 2020-01-09 PROCEDURE — 80048 BASIC METABOLIC PNL TOTAL CA: CPT

## 2020-01-09 PROCEDURE — 99232 SBSQ HOSP IP/OBS MODERATE 35: CPT | Performed by: INTERNAL MEDICINE

## 2020-01-09 PROCEDURE — 2580000003 HC RX 258: Performed by: ORTHOPAEDIC SURGERY

## 2020-01-09 PROCEDURE — 6360000002 HC RX W HCPCS: Performed by: INTERNAL MEDICINE

## 2020-01-09 PROCEDURE — 85014 HEMATOCRIT: CPT

## 2020-01-09 PROCEDURE — 97162 PT EVAL MOD COMPLEX 30 MIN: CPT

## 2020-01-09 PROCEDURE — 51798 US URINE CAPACITY MEASURE: CPT

## 2020-01-09 PROCEDURE — 2580000003 HC RX 258: Performed by: INTERNAL MEDICINE

## 2020-01-09 RX ADMIN — CEFEPIME 1 G: 1 INJECTION, POWDER, FOR SOLUTION INTRAMUSCULAR; INTRAVENOUS at 16:09

## 2020-01-09 RX ADMIN — CEFEPIME 1 G: 1 INJECTION, POWDER, FOR SOLUTION INTRAMUSCULAR; INTRAVENOUS at 06:26

## 2020-01-09 RX ADMIN — CARVEDILOL 25 MG: 25 TABLET, FILM COATED ORAL at 16:08

## 2020-01-09 RX ADMIN — DOCUSATE SODIUM 100 MG: 100 CAPSULE, LIQUID FILLED ORAL at 23:04

## 2020-01-09 RX ADMIN — APIXABAN 2.5 MG: 2.5 TABLET, FILM COATED ORAL at 06:26

## 2020-01-09 RX ADMIN — OXYCODONE HYDROCHLORIDE 5 MG: 5 TABLET ORAL at 08:52

## 2020-01-09 RX ADMIN — SENNOSIDES AND DOCUSATE SODIUM 1 TABLET: 8.6; 5 TABLET ORAL at 23:04

## 2020-01-09 RX ADMIN — DOCUSATE SODIUM 100 MG: 100 CAPSULE, LIQUID FILLED ORAL at 07:31

## 2020-01-09 RX ADMIN — APIXABAN 2.5 MG: 2.5 TABLET, FILM COATED ORAL at 23:04

## 2020-01-09 RX ADMIN — CEFAZOLIN SODIUM 1 G: 1 INJECTION, SOLUTION INTRAVENOUS at 06:26

## 2020-01-09 RX ADMIN — SENNOSIDES AND DOCUSATE SODIUM 1 TABLET: 8.6; 5 TABLET ORAL at 07:31

## 2020-01-09 RX ADMIN — VANCOMYCIN HYDROCHLORIDE 750 MG: 750 INJECTION, POWDER, LYOPHILIZED, FOR SOLUTION INTRAVENOUS at 17:42

## 2020-01-09 RX ADMIN — CARVEDILOL 25 MG: 25 TABLET, FILM COATED ORAL at 07:31

## 2020-01-09 RX ADMIN — SODIUM CHLORIDE, PRESERVATIVE FREE 2 G: 5 INJECTION INTRAVENOUS at 23:06

## 2020-01-09 ASSESSMENT — PAIN SCALES - WONG BAKER
WONGBAKER_NUMERICALRESPONSE: 4
WONGBAKER_NUMERICALRESPONSE: 2

## 2020-01-09 ASSESSMENT — PAIN DESCRIPTION - ORIENTATION
ORIENTATION: LEFT
ORIENTATION: LEFT

## 2020-01-09 ASSESSMENT — PAIN DESCRIPTION - PROGRESSION: CLINICAL_PROGRESSION: NOT CHANGED

## 2020-01-09 ASSESSMENT — PAIN DESCRIPTION - DESCRIPTORS: DESCRIPTORS: ACHING

## 2020-01-09 ASSESSMENT — PAIN DESCRIPTION - LOCATION
LOCATION: HIP
LOCATION: HIP

## 2020-01-09 ASSESSMENT — PAIN SCALES - GENERAL: PAINLEVEL_OUTOF10: 5

## 2020-01-09 ASSESSMENT — PAIN DESCRIPTION - FREQUENCY: FREQUENCY: INTERMITTENT

## 2020-01-09 ASSESSMENT — PAIN DESCRIPTION - PAIN TYPE: TYPE: SURGICAL PAIN

## 2020-01-09 ASSESSMENT — PAIN DESCRIPTION - ONSET: ONSET: ON-GOING

## 2020-01-09 ASSESSMENT — PAIN - FUNCTIONAL ASSESSMENT: PAIN_FUNCTIONAL_ASSESSMENT: PREVENTS OR INTERFERES SOME ACTIVE ACTIVITIES AND ADLS

## 2020-01-09 NOTE — PROGRESS NOTES
Cardiology Daily Note Bishnu Greenwood MD      Patient:  Win Avalos  [id number]    Patient Active Problem List    Diagnosis Date Noted    Elevated brain natriuretic peptide (BNP) level 01/08/2020     Priority: Low    Acute hypoxemic respiratory failure ([address] 01/08/2020     Priority: Low    D-dimer, elevated 01/08/2020     Priority: Low    Sepsis ([address] 01/08/2020     Priority: Low    Hyperkalemia 01/08/2020     Priority: Low    Hip fracture requiring operative repair, left, closed, initial encounter ([facility] [address] 01/07/2020     Priority: Low    Acute blood loss as cause of postoperative anemia 01/20/2018     Priority: Low    Acute cystitis without hematuria 01/20/2018     Priority: Low    Iron deficiency anemia secondary to inadequate dietary iron intake      Priority: Low    Essential hypertension      Priority: Low    Hyperlipidemia      Priority: Low    Closed right hip fracture, initial encounter ([facility] [address]      Priority: Low       Admit Date:  1/7/2020    Admission Problem List: Present on Admission:   Hip fracture requiring operative repair, left, closed, initial encounter ([facility] [address]   Essential hypertension   Hyperlipidemia   Elevated brain natriuretic peptide (BNP) level   Acute hypoxemic respiratory failure (HCC)   D-dimer, elevated   Sepsis (HCC)   Hyperkalemia      Cardiac Specific Data:  Specialty Problems        Cardiology Problems    Essential hypertension              Subjective:  Ms. Ailin Saab seen today status post operative repair now postoperative day #1 denies any complaints denies dyspnea denies chest pain. CTA pulmonary yesterday no evidence of pulmonary embolism but bilateral lung base consolidation suspicious for pneumonia. Echocardiogram mild to moderate aortic stenosis mild AR severe tricuspid regurgitation normal left ventricular systolic function. No additional diagnostic testing indicated at this time from a cardiac perspective. Continue to monitor closely.     Objective:   BP 132/84   Pulse 96   Temp 98.1 °F (36.7 °C) (Temporal)   Resp 20   Ht 5' (1.524 m)   Wt 107 lb 11.2 oz (48.9 kg)   SpO2 94%   BMI 21.03 kg/m²       Intake/Output Summary (Last 24 hours) at 1/9/2020 0857  Last data filed at 1/9/2020 1956  Gross per 24 hour   Intake 800 ml   Output 425 ml   Net 375 ml       Prior to Admission medications    Medication Sig Start Date End Date Taking?  Authorizing Provider   potassium citrate (UROCIT-K) 10 MEQ (1080 MG) extended release tablet Take by mouth 2 times daily   Yes Historical Provider, MD   carvedilol (COREG) 25 MG tablet Take 25 mg by mouth 2 times daily (with meals)   Yes Historical Provider, MD        cefepime  1 g Intravenous Q12H    vancomycin (VANCOCIN) intermittent dosing (placeholder)   Other RX Placeholder    vancomycin  750 mg Intravenous Q24H    sodium chloride flush  10 mL Intravenous 2 times per day    docusate sodium  100 mg Oral BID    sennosides-docusate sodium  1 tablet Oral BID    apixaban  2.5 mg Oral BID    carvedilol  25 mg Oral BID WC       TELEMETRY: Sinus     Physical Exam:      Physical Exam      General:  Awake, alert, NAD  Skin:  Warm and dry  Neck:  no jvd , no carotid bruits  Chest:  Clear to auscultation, no wheezing or rales  Cardiovascular:  RRR O8Q3 grade 2/6 systolic murmur right upper sternal border otherwise no clicks, gallups, or rubs  Abdomen:  Soft nontender, nondistended, bowel sounds present  Extremities:  Edema: none       Lab Data:  CBC:   Recent Labs     01/08/20  0336 01/08/20  0835 01/08/20  1624 01/09/20  0350   WBC 17.5* 16.4* 13.5*  --    HGB 11.1* 11.5* 10.9* 10.4*   HCT 35.0* 36.2* 33.6* 32.4*   .5* 111.7* 111.6*  --     135 107*  --      BMP:   Recent Labs     01/08/20  0336 01/08/20  0835 01/08/20  1624 01/09/20  0350    140  --  141   K 4.4 5.1* 4.5 4.3    103  --  103   CO2 25 23  --  24   BUN 20 24*  --  30*   CREATININE 0.8 0.9  --  0.8     LIVER PROFILE: No results for input(s):

## 2020-01-09 NOTE — PROGRESS NOTES
Pharmacy Renal Adjustment    Sarmad Santo is a 80 y.o. female. Pharmacy has renally adjusted medications per protocol. Recent Labs     01/08/20  0336 01/08/20  0835   BUN 20 24*       Recent Labs     01/08/20  0336 01/08/20  0835   CREATININE 0.8 0.9       Estimated Creatinine Clearance: 28 mL/min (based on SCr of 0.9 mg/dL).     Height:   Ht Readings from Last 1 Encounters:   01/07/20 5' (1.524 m)     Weight:  Wt Readings from Last 1 Encounters:   01/07/20 110 lb (49.9 kg)     Plan: Adjust the following medications based on renal function:           Ancef 1 g IV every 8 hours for 2 doses post op to 1 g IV every 12 hours for 1 dose (total 24 hours of coverage)    Electronically signed by Sun Nguyễn RPH on 1/9/2020 at 4:16 AM

## 2020-01-09 NOTE — PROGRESS NOTES
Pharmacy Renal Adjustment    Bess Alex is a 80 y.o. female. Pharmacy has renally adjusted Maxipime per protocol. Recent Labs     01/08/20  0835 01/09/20  0350   BUN 24* 30*       Recent Labs     01/08/20  0835 01/09/20  0350   CREATININE 0.9 0.8       Estimated Creatinine Clearance: 32 mL/min (based on SCr of 0.8 mg/dL). Height:   Ht Readings from Last 1 Encounters:   01/07/20 5' (1.524 m)     Weight:  Wt Readings from Last 1 Encounters:   01/09/20 107 lb 11.2 oz (48.9 kg)         Plan: For the indication of sepsis and the patient's current renal status, Cefepime 2 g IV q12h is the appropriate treatment. Dose will be returned to the dose and schedule.     Electronically signed by Laura Arroyo RPh, BCPS, 1/9/2020,4:56 PM

## 2020-01-09 NOTE — PROGRESS NOTES
Orientation: Left  Pain Descriptors: Aching  Pain Frequency: Intermittent  Pain Onset: On-going  Clinical Progression: Not changed  Functional Pain Assessment: Prevents or interferes some active activities and ADLs  Non-Pharmaceutical Pain Intervention(s): Ambulation/Increased Activity; Elevation  Response to Pain Intervention: Patient Satisfied  Vital Signs  Level of Consciousness: Alert  Patient Currently in Pain: (Pt REQUESTED PAIN MEDS AND THESE WERE GIVEN BY RN.)  Social/Functional History  Social/Functional History  Lives With: Family  Additional Comments:  pt STATES HER DTR ASSISTED HER WITH BATHING AND DRESSING AS NEEDED BUT SHE AMB AROUND THE HOUSE IND WITHOUT AN AD. 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 Requires assist of another to ensure TTWB. Toilet Transfers  Equipment Used: Standard bedside commode  Toilet Transfer: Maximum assistance  Toilet Transfers Comments: Assist of another for TTWB. 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         Bed mobility  Supine to Sit: Maximum assistance  Transfers  Stand Pivot

## 2020-01-09 NOTE — PROGRESS NOTES
Physical Therapy  Name: Deborah Hines  MRN:  [id number]  Date of service:  1/9/2020 01/09/20 1437   Lower Extremity Weight Bearing Restrictions   Left Lower Extremity Weight Bearing Toe Touch Weight Bearing   Subjective   Subjective Pt states she is ready to go btb. RN present to  assist iwth cleaning from bowel movement. Bed Mobility   Rolling Maximal assistance   Comment worked with pt on rolling R and L with placing a pillow between le's for rolling the R. Transfers   Sit to Stand Moderate Assistance   Stand to sit Minimal Assistance   Stand Pivot Transfers Maximum Assistance   Balance   Posture Poor   Sitting - Static Fair   Comments allowed pT to sit EOB to work on postural control/balance   Short term goals   Time Frame for Short term goals 2 WKS   Short term goal 1 SUP<>SIT, MOD A 1   Short term goal 2 SIT<>STAND, MIN A 1   Short term goal 3 PIVOT TF WITH MOD A 1   Short term goal 4 TF WITH RW AND MOD 1   Conditions Requiring Skilled Therapeutic Intervention   Body structures, Functions, Activity limitations Decreased functional mobility ; Decreased strength;Decreased safe awareness; Increased pain;Decreased balance;Decreased endurance;Decreased posture   Assessment Pt working well with PT on TF training and bed mobility. pain appears controlled to optimize pt participation. Discharge Recommendations Continue to assess pending progress; Patient would benefit from continued therapy after discharge   Activity Tolerance   Activity Tolerance Patient Tolerated treatment well   PT Equipment Recommendations   Other ASSESSING   Plan   Times per week 5-7   Plan weeks 2   Current Treatment Recommendations Patient/Caregiver Education & Training;Strengthening; Functional Mobility Training;Transfer Training; Safety Education & Training;Positioning;Pain Management;Cognitive Reorientation   Plan Comment TT WB L LE, DOUBLE. INITIALLY PIVOT TF AND PROGRESS TO USE OF RW AS ABLE.  RN NOTIFIED THAT pt WOULD BE BTB WITH PT/OT   Safety Devices   Type of devices Call light within reach;Gait belt;Left in bed;Bed alarm in place       Electronically signed by Royer Vergara PT on 1/9/2020 at 2:52 PM

## 2020-01-09 NOTE — PROGRESS NOTES
8.8 9.2  --  8.4         CRP:  No results for input(s): CRP in the last 72 hours. Sed Rate:  No results for input(s): SEDRATE in the last 72 hours. HgBA1c:  No components found for: HGBA1C  FLP:  No results found for: TRIG, HDL, LDLCALC, LDLDIRECT, LABVLDL  TSH:    Lab Results   Component Value Date    TSH 2.160 12/17/2017     Troponin T:   Recent Labs     01/07/20  1626   TROPONINI <0.01     Pro-BNP: No results for input(s): BNP in the last 72 hours. INR:   Recent Labs     01/07/20  1626   INR 1.11     ABGs:   Lab Results   Component Value Date    PHART 7.420 01/07/2020    PO2ART 45.0 01/07/2020    AEK7JSN 36.0 01/07/2020     UA:  Recent Labs     01/07/20  1806   COLORU YELLOW   PHUR 6.0   WBCUA 4   RBCUA 3   BACTERIA NEGATIVE   CLARITYU Clear   SPECGRAV 1.013   LEUKOCYTESUR SMALL*   UROBILINOGEN 0.2   BILIRUBINUR Negative   BLOODU Negative   GLUCOSEU Negative         Culture Results:    No results for input(s): CXSURG in the last 720 hours. Blood Culture Recent: No results for input(s): BC in the last 720 hours. Cultures:   No results for input(s): CULTURE in the last 72 hours. No results for input(s): BC, Teodora Nia in the last 72 hours. No results for input(s): CXSURG in the last 72 hours. No results for input(s): MG, PHOS in the last 72 hours. No results for input(s): AST, ALT, ALB, BILITOT, ALKPHOS, ALB in the last 72 hours. RAD:   Ct Head Wo Contrast    Result Date: 1/7/2020  CT HEAD WO CONTRAST 1/7/2020 4:00 PM History: Head pain post fall Comparisons: 10/5/2019 head CT Technique: Radiation dose equals  mGy cm. AUTOMATED EXPOSURE CONTROL dose reduction technique was implemented CT evaluation of the head without intravenous contrast. 5 mm transaxial images were obtained. 2-D sagittal and coronal reconstruction images were generated. Findings: There is central and cortical atrophy.  There is low attenuation in the periventricular and subcortical white matter, chronic ischemic Tenderness: There is no tenderness. Musculoskeletal:         General: No swelling or tenderness. Right lower leg: No edema. Left lower leg: No edema. Comments: Decreased range of motion in left hip due to pain. Intact ROM in bilateral upper extremity, as well as right lower extremity. Skin:     General: Skin is warm and dry. Capillary Refill: Capillary refill takes less than 2 seconds. Coloration: Skin is not jaundiced. Neurological:      General: No focal deficit present. Mental Status: She is alert. Cranial Nerves: No cranial nerve deficit. Sensory: No sensory deficit. Psychiatric:         Mood and Affect: Mood normal.         Behavior: Behavior normal.         Thought Content: Thought content normal.         Judgment: Judgment normal.           Assessment/plan:         Hospital Problems           Last Modified POA    * (Principal) Hip fracture requiring operative repair, left, closed, initial encounter ([facility] [address] 1/8/2020 Yes    Elevated brain natriuretic peptide (BNP) level 1/8/2020 Yes    Acute hypoxemic respiratory failure ([facility] [address] 1/8/2020 Yes    D-dimer, elevated 1/8/2020 Yes    Sepsis ([facility] [address] 1/8/2020 Yes    Hyperkalemia 1/8/2020 Yes    Essential hypertension 1/8/2020 Yes    Hyperlipidemia 1/8/2020 Yes            Acute closed traumatic valgus impacted minimally displaced subcapital left femoral neck fracture. · Status post reported mechanical fall. · Fracture likely related to fall and reported history of osteoporosis. · CT head with no acute intracranial pathology  · Orthopedics following  · Status post percutaneous screw fixation of left minimally displaced subcapital femoral head fracture. (01/08/2020)  · PT OT  · Continue symptomatic and supportive management, including pain management PRN. Sepsis  Leukocytosis, initial WBC of 19.4, with a left shift.   Gram-positive bacteremia  · Source of infection likely pneumonia  · Afebrile  · Tachypneic, up to RR of 34  · Tachycardic, with HR up to 111. · Leukocytosis now improving  · Initial blood cultures (drawn 01/08/2020) reportedly growing gram-positive cocci and pairs. · Follow-up repeat Blood cultures x2 sets  · Lactic acid level within lab reference range  · Follow-up Procalcitonin level  · Initial Chest x-ray unremarkable  · CTA (01/08/2020): Impression: No evidence of PE or acute aortic pathology. Consolidation in the lung bases, left greater than right, concerning for pneumonia. Small bilateral pleural effusions, right greater than left. Cardiomegaly. Atherosclerosis of the aorta and coronary arteries. Moderate-sized hiatal hernia. · UA unremarkable  · Continue empiric antibiotics (vancomycin and cefepime)  · Monitor CBC with differential      Acute hypoxemic respiratory failure  Elevated proBNP  HFpEF  Elevated d-dimer  · Initial chest x-ray (01/07/2020) \"generous heart size with diffuse mild perihilar interstitial  prominence without parenchymal infiltrates or evidence of overt heart failure. \"  · Hypoxic, initial presentation, with initial SPO2 of 82 mmHg  · Hypoxemic with PaO2 of 45 mmHg on initial ABG (01/07/2020)  · CTA (01/08/2020): Impression: No evidence of PE or acute aortic pathology. Consolidation in the lung bases, left greater than right, concerning for pneumonia. Small bilateral pleural effusions, right greater than left. Cardiomegaly. Atherosclerosis of the aorta and coronary arteries. Moderate-sized hiatal hernia. · ProBNP level of 14,322 (01/08/2019)  · Strict I's and O's  · Fluid restriction  · Cardiology following      Hyperkalemia  · Potassium of 5.1  · Resolved  · Monitor BMP. Hyperglycemia  · No documented history of diabetes  · Hemoglobin A1c level (01/08/2020): 4.5%. History of Essential hypertension  · Continue carvedilol                  Continue management of other chronic medical conditions - see above and orders.             Advance Directive: Full Code    Dietary Nutrition Supplements: Standard High Calorie Oral Supplement  DIET RENAL;     DVT prophylaxis: Enoxaparin    Consults Made:   IP CONSULT TO ORTHOPEDIC SURGERY  IP CONSULT TO HOSPITALIST  IP CONSULT TO CARDIOLOGY  IP CONSULT TO DIETITIAN  IP CONSULT TO SOCIAL WORK  PHARMACY TO DOSE VANCOMYCIN    Discharge planning: tbd    Time Spent is 35 mins in the examination, evaluation, counseling and review of medications, assessment and plan.      Electronically signed by   Mary Aparicio MD, MPH,   Internal Medicine Hospitalist   1/9/2020 11:11 AM

## 2020-01-09 NOTE — PROGRESS NOTES
Physical Therapy    Facility/Department: [facility] SURG SERVICES  Initial Assessment    NAME: Christi Caro  : 1926  MRN: [id number]    Date of Service: 2020    Discharge Recommendations:  Continue to assess pending progress, Patient would benefit from continued therapy after discharge   PT Equipment Recommendations  Other: ASSESSING    Assessment   Body structures, Functions, Activity limitations: Decreased functional mobility ; Decreased strength;Decreased safe awareness; Increased pain;Decreased balance;Decreased endurance;Decreased posture  Assessment: pt COOPERATIVE WITH PT TO BEGIN MOBILITY TRAINING POST OP L HIP REPAIR. UNABLE TO USE A WALKER AT TIME OF EVAL AND KEEP WEIGHT OFF L LE. Decision Making: Medium Complexity  PT Education: Weight-bearing Education;General Safety;Transfer Training  Patient Education: TTWB L LE  REQUIRES PT FOLLOW UP: Yes  Activity Tolerance  Activity Tolerance: Patient Tolerated treatment well       Patient Diagnosis(es): The primary encounter diagnosis was Closed fracture of left hip, initial encounter ([facility] [address] A diagnosis of Fall from bed, initial encounter was also pertinent to this visit. has a past medical history of Closed right hip fracture, initial encounter ([facility] [address] Hyperlipidemia, and Hypertension. has a past surgical history that includes Thyroidectomy; pr reconstruc hip socket (Right, 2018); and hip pinning (Left, 2020). Restrictions  Restrictions/Precautions  Restrictions/Precautions: Fall Risk, Weight Bearing  Lower Extremity Weight Bearing Restrictions  Left Lower Extremity Weight Bearing: Toe Touch Weight Bearing  Vision/Hearing  Hearing: Exceptions to [facility]  Hearing Exceptions: Hard of hearing/hearing concerns     Subjective  General  Patient assessed for rehabilitation services?: Yes  Diagnosis: FALL AT HOME WITH FX L HIP.   Follows Commands: Within Functional Limits  General Comment  Comments: REMOVED 02 FOR TF AND NOTED SP02 TO DROP TO 80 % AND RETURNED TO 92 WITH 02 IN PLACE. RN NOTIFIED  Subjective  Subjective: pt STATES \"I WANT TO GO HOME\". EXPRESSES UNDERSTANDING OF TTWB ON L LE. Pain Screening  Patient Currently in Pain: (Pt REQUESTED PAIN MEDS AND THESE WERE GIVEN BY RN.)  Vital Signs  Patient Currently in Pain: (Pt REQUESTED PAIN MEDS AND THESE WERE GIVEN BY RN.)       Orientation  Orientation  Overall Orientation Status: Impaired  Orientation Level: Oriented to person;Disoriented to time;Oriented to place; Disoriented to situation  Social/Functional History  Social/Functional History  Lives With: Family  Additional Comments:  pt STATES HER DTR ASSISTED HER WITH BATHING AND DRESSING AS NEEDED BUT SHE AMB AROUND THE HOUSE IND WITHOUT AN AD. 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 Transfers  Sit to Stand: Moderate Assistance  Stand to sit: Minimal Assistance  Stand Pivot Transfers: Maximum Assistance(1+1 TO MANAGE L LE TO MAINTAIN TTWB)        Balance  Posture: Poor(SEVERE FW HEAD AND THORACIC KYPHOSIS)  Sitting - Static: Fair  Comments: ABLE TO SIT EOB AND HOLD BALANCE WITHOUT PERTURBATIONS        Plan   Plan  Times per week: 5-7  Plan weeks: 2  Current Treatment Recommendations: Patient/Caregiver Education & Training, Strengthening, Functional Mobility Training, Transfer Training, Safety Education & Training, Positioning, Pain Management, Cognitive Reorientation  Plan Comment: TT WB L LE, DOUBLE. INITIALLY PIVOT TF AND PROGRESS TO USE OF RW AS ABLE.  RN NOTIFIED THAT pt WOULD BE BTB WITH PT/OT  Safety Devices  Type of devices: Call light within reach, Left in chair, Positioning belt(INFORAMED RAN OF NO CHAIR ALARM)    G-Code       OutComes Score                                                  AM-PAC Score             Goals  Short term goals  Time Frame for Short term goals: 2 WKS  Short term goal 1: SUP<>SIT, MOD A 1  Short term goal 2: SIT<>STAND, MIN A 1  Short term goal 3: PIVOT TF WITH MOD A 1  Short term goal 4: TF WITH RW AND MOD 1       Therapy Time   Individual Concurrent Group Co-treatment   Time In           Time Out           Minutes                   Tanesha Branch, PT    Electronically signed by Tanesha Branch PT on 1/9/2020 at 11:38 AM

## 2020-01-09 NOTE — BRIEF OP NOTE
Brief Postoperative Note  ______________________________________________________________    Patient: Valdo Rodriguez  YOB: 1926  MRN: [id number]  Date of Procedure: 1/8/2020    Pre-Op Diagnosis: FRACTURE L HIP    Post-Op Diagnosis: Same       Procedure(s):  3 SCREWS/ PERCUTANEOUS HIP PINNING    Anesthesia: General    Surgeon(s):  Mary Castro MD    Assistant: None    Estimated Blood Loss (mL): < 10 mL    Complications: None    Specimens:   * No specimens in log *    Implants:  Implant Name Type Inv.  Item Serial No.  Lot No. LRB No. Used   SCREW MEMO 16MM THRD SS [id number] Screw/Plate/Nail/Yo SCREW MEMO 16MM THRD SS 6.5X70MM  SYNTHES  Left 2   SCREW MEMO 16MM THRD SS 6.5X85MM Screw/Plate/Nail/Yo SCREW MEMO 16MM THRD SS 6.5X85MM  SYNTHES  Left 1         Drains:   Urethral Catheter Straight-tip 16 fr (Active)   Catheter Indications Perioperative use in selected surgeries including but not limited to urologic, pelvic or need for intraoperative monitoring of urinary output due to prolonged surgery, large volume infusion or need for diuretic therapy in surgery 1/7/2020  7:35 PM   Securement Device Date Changed 01/07/20 1/7/2020  7:35 PM   Site Assessment [place] 1/7/2020  7:35 PM   Urine Color Yellow 1/7/2020  7:35 PM   Urine Appearance Clear 1/7/2020  7:35 PM   Output (mL) 125 mL 1/8/2020  2:44 PM       Findings: Stable impacted left femoral neck fracture    Mary aCstro MD  Date: 1/8/2020  Time: 6:44 PM

## 2020-01-09 NOTE — CARE COORDINATION
AMBAR met with Pt/family regarding recommendations for short term rehab placement before Pt goes back home. Pt family asked if Pt wanted to go to rehab and she stated no she wanted to go home. Pt family stated she will have someone with her 24/7 when she goes home. Pt may benefit from at least [facility] services at OR if medical team agrees.  Electronically signed by Elizabeth Casper on 1/9/2020 at 1:07 PM

## 2020-01-09 NOTE — PROGRESS NOTES
Occupational Therapy  Facility/Department: [facility] SURG SERVICES  Daily Treatment Note  NAME: Jeronimo Higgins  : 1926  MRN: [id number]    Date of Service: 2020    Discharge Recommendations:  Patient would benefit from continued therapy after discharge       Assessment   Assessment: Assisted back to bed. Bed mobility and ADL training due to incontinent of bowel  Treatment Diagnosis: Left hip fracture status post per cutaneous pinning. History: History of Right hip anterior hip replacement. REQUIRES OT FOLLOW UP: Yes  Activity Tolerance  Activity Tolerance: Patient Tolerated treatment well  Safety Devices  Safety Devices in place: Yes  Type of devices: Bed alarm in place;Call light within reach; Left in bed         Patient Diagnosis(es): The primary encounter diagnosis was Closed fracture of left hip, initial encounter ([facility] [address] A diagnosis of Fall from bed, initial encounter was also pertinent to this visit. has a past medical history of Closed right hip fracture, initial encounter ([facility] [address] Hyperlipidemia, and Hypertension. has a past surgical history that includes Thyroidectomy; pr reconstruc hip socket (Right, 2018); and hip pinning (Left, 2020).     Restrictions  Restrictions/Precautions  Restrictions/Precautions: Fall Risk, Weight Bearing  Lower Extremity Weight Bearing Restrictions  Left Lower Extremity Weight Bearing: Toe Touch Weight Bearing  Subjective   General  Chart Reviewed: Yes  Patient assessed for rehabilitation services?: Yes  Family / Caregiver Present: No(stepped out during tx (dtr))  Pain Assessment  Pain Assessment: Faces  Brandt-Baker Pain Rating: Hurts a little bit  Pain Location: Hip  Pain Orientation: Left  Non-Pharmaceutical Pain Intervention(s): Ambulation/Increased Activity;Repositioned;Elevation  Response to Pain Intervention: Patient Satisfied  Vital Signs  Patient Currently in Pain: Yes   Orientation    Objective    ADL    Toileting: Maximum assistance(rolling right and tilting to right for hygiene after incontinence of bowel)        Balance  Bed mobility  Supine to Sit: Maximum assistance  Sit to Supine: Maximum assistance(assist of two for pain management)  Transfers  Stand Pivot Transfers: Maximum assistance                                                                 Plan   Plan  Times per week: 4-8 X weekly  G-Code     OutComes Score                                                  AM-PAC Score             Goals  Short term goals  Time Frame for Short term goals: 1-2 weeks  Short term goal 1: Perform toilet transfers with moderate assistance. Short term goal 2: Demo ability to stand and maintain TTWB with min/mod assist to promote improved clothing management for toileting and dressing. Short term goal 3: Pt/Caregiver to be independent with therapeutic exercises and positioning recommendations. Long term goals  Long term goal 1: Upgrade as tolerated.        Therapy Time   Individual Concurrent Group Co-treatment   Time In           Time Out 0908         Minutes                   Laurie Kim OT Electronically signed by Laurie Kim OT on 1/9/2020 at 3:20 PM

## 2020-01-10 LAB
ANION GAP SERPL CALCULATED.3IONS-SCNC: 12 MMOL/L (ref 7–19)
BASOPHILS ABSOLUTE: 0 K/UL (ref 0–0.2)
BASOPHILS RELATIVE PERCENT: 0.3 % (ref 0–1)
BLOOD CULTURE, ROUTINE: ABNORMAL
BUN BLDV-MCNC: 30 MG/DL (ref 8–23)
CALCIUM SERPL-MCNC: 8.3 MG/DL (ref 8.2–9.6)
CHLORIDE BLD-SCNC: 102 MMOL/L (ref 98–111)
CO2: 22 MMOL/L (ref 22–29)
CREAT SERPL-MCNC: 0.7 MG/DL (ref 0.5–0.9)
EOSINOPHILS ABSOLUTE: 0.4 K/UL (ref 0–0.6)
EOSINOPHILS RELATIVE PERCENT: 3.8 % (ref 0–5)
GFR NON-AFRICAN AMERICAN: >60
GLUCOSE BLD-MCNC: 105 MG/DL (ref 74–109)
HCT VFR BLD CALC: 28.5 % (ref 37–47)
HEMOGLOBIN: 9.3 G/DL (ref 12–16)
IMMATURE GRANULOCYTES #: 0.1 K/UL
LYMPHOCYTES ABSOLUTE: 0.6 K/UL (ref 1.1–4.5)
LYMPHOCYTES RELATIVE PERCENT: 5.5 % (ref 20–40)
MCH RBC QN AUTO: 36.2 PG (ref 27–31)
MCHC RBC AUTO-ENTMCNC: 32.6 G/DL (ref 33–37)
MCV RBC AUTO: 110.9 FL (ref 81–99)
MONOCYTES ABSOLUTE: 0.6 K/UL (ref 0–0.9)
MONOCYTES RELATIVE PERCENT: 5.5 % (ref 0–10)
NEUTROPHILS ABSOLUTE: 8.8 K/UL (ref 1.5–7.5)
NEUTROPHILS RELATIVE PERCENT: 84.4 % (ref 50–65)
ORGANISM: ABNORMAL
ORGANISM: ABNORMAL
PDW BLD-RTO: 11.4 % (ref 11.5–14.5)
PLATELET # BLD: 95 K/UL (ref 130–400)
PMV BLD AUTO: 12.7 FL (ref 9.4–12.3)
POTASSIUM SERPL-SCNC: 3.9 MMOL/L (ref 3.5–5)
PROCALCITONIN: 5.33 NG/ML
RBC # BLD: 2.57 M/UL (ref 4.2–5.4)
SODIUM BLD-SCNC: 136 MMOL/L (ref 136–145)
WBC # BLD: 10.4 K/UL (ref 4.8–10.8)

## 2020-01-10 PROCEDURE — 6360000002 HC RX W HCPCS: Performed by: INTERNAL MEDICINE

## 2020-01-10 PROCEDURE — 1210000000 HC MED SURG R&B

## 2020-01-10 PROCEDURE — 80048 BASIC METABOLIC PNL TOTAL CA: CPT

## 2020-01-10 PROCEDURE — 97530 THERAPEUTIC ACTIVITIES: CPT

## 2020-01-10 PROCEDURE — 97535 SELF CARE MNGMENT TRAINING: CPT

## 2020-01-10 PROCEDURE — 6360000002 HC RX W HCPCS: Performed by: ORTHOPAEDIC SURGERY

## 2020-01-10 PROCEDURE — 2580000003 HC RX 258: Performed by: ORTHOPAEDIC SURGERY

## 2020-01-10 PROCEDURE — 36415 COLL VENOUS BLD VENIPUNCTURE: CPT

## 2020-01-10 PROCEDURE — 6370000000 HC RX 637 (ALT 250 FOR IP): Performed by: ORTHOPAEDIC SURGERY

## 2020-01-10 PROCEDURE — 51798 US URINE CAPACITY MEASURE: CPT

## 2020-01-10 PROCEDURE — 2700000000 HC OXYGEN THERAPY PER DAY

## 2020-01-10 PROCEDURE — 51701 INSERT BLADDER CATHETER: CPT

## 2020-01-10 PROCEDURE — 2580000003 HC RX 258: Performed by: INTERNAL MEDICINE

## 2020-01-10 PROCEDURE — 85025 COMPLETE CBC W/AUTO DIFF WBC: CPT

## 2020-01-10 RX ADMIN — APIXABAN 2.5 MG: 2.5 TABLET, FILM COATED ORAL at 08:12

## 2020-01-10 RX ADMIN — SENNOSIDES AND DOCUSATE SODIUM 1 TABLET: 8.6; 5 TABLET ORAL at 21:55

## 2020-01-10 RX ADMIN — DOCUSATE SODIUM 100 MG: 100 CAPSULE, LIQUID FILLED ORAL at 21:55

## 2020-01-10 RX ADMIN — SODIUM CHLORIDE, PRESERVATIVE FREE 2 G: 5 INJECTION INTRAVENOUS at 21:55

## 2020-01-10 RX ADMIN — APIXABAN 2.5 MG: 2.5 TABLET, FILM COATED ORAL at 21:56

## 2020-01-10 RX ADMIN — VANCOMYCIN HYDROCHLORIDE 750 MG: 750 INJECTION, POWDER, LYOPHILIZED, FOR SOLUTION INTRAVENOUS at 17:17

## 2020-01-10 RX ADMIN — SODIUM CHLORIDE, PRESERVATIVE FREE 2 G: 5 INJECTION INTRAVENOUS at 09:29

## 2020-01-10 RX ADMIN — DOCUSATE SODIUM 100 MG: 100 CAPSULE, LIQUID FILLED ORAL at 08:13

## 2020-01-10 RX ADMIN — CARVEDILOL 25 MG: 25 TABLET, FILM COATED ORAL at 17:16

## 2020-01-10 RX ADMIN — OXYCODONE HYDROCHLORIDE 5 MG: 5 TABLET ORAL at 21:56

## 2020-01-10 RX ADMIN — CARVEDILOL 25 MG: 25 TABLET, FILM COATED ORAL at 08:13

## 2020-01-10 RX ADMIN — SODIUM CHLORIDE, PRESERVATIVE FREE 10 ML: 5 INJECTION INTRAVENOUS at 09:29

## 2020-01-10 RX ADMIN — SENNOSIDES AND DOCUSATE SODIUM 1 TABLET: 8.6; 5 TABLET ORAL at 08:12

## 2020-01-10 ASSESSMENT — PAIN DESCRIPTION - DESCRIPTORS: DESCRIPTORS: ACHING

## 2020-01-10 ASSESSMENT — PAIN SCALES - WONG BAKER: WONGBAKER_NUMERICALRESPONSE: 6

## 2020-01-10 ASSESSMENT — PAIN DESCRIPTION - PAIN TYPE: TYPE: SURGICAL PAIN

## 2020-01-10 ASSESSMENT — PAIN SCALES - GENERAL
PAINLEVEL_OUTOF10: 0
PAINLEVEL_OUTOF10: 5

## 2020-01-10 ASSESSMENT — PAIN DESCRIPTION - LOCATION: LOCATION: HIP

## 2020-01-10 ASSESSMENT — PAIN DESCRIPTION - ORIENTATION: ORIENTATION: LEFT

## 2020-01-10 NOTE — PROGRESS NOTES
Plan   Plan  Times per week: 4-8 X weekly  G-Code     OutComes Score                                                  AM-PAC Score             Goals  Short term goals  Time Frame for Short term goals: 1-2 weeks  Short term goal 1: Perform toilet transfers with moderate assistance. Short term goal 2: Demo ability to stand and maintain TTWB with min/mod assist to promote improved clothing management for toileting and dressing. Short term goal 3: Pt/Caregiver to be independent with therapeutic exercises and positioning recommendations. Long term goals  Long term goal 1: Upgrade as tolerated.        Therapy Time   Individual Concurrent Group Co-treatment   Time In           Time Out           Minutes                   CICI Cornelius

## 2020-01-10 NOTE — PROGRESS NOTES
Physical Therapy  Name: Bill Garcia  MRN:  [id number]  Date of service:  1/10/2020     01/10/20 1545   Lower Extremity Weight Bearing Restrictions   Left Lower Extremity Weight Bearing Toe Touch Weight Bearing   Subjective   Subjective Patient agreeable to work with therapy   General Comment   Comments Charting is for both morning and afternoon tx's   Pain Screening   Patient Currently in Pain Yes   Pain Assessment   Pain Assessment Faces   Brandt-Clark Pain Rating 6   Pain Type Surgical pain   Pain Location Hip   Pain Orientation Left   Pain Descriptors Aching   Bed Mobility   Rolling Maximal assistance   Supine to Sit Maximal assistance   Sit to Supine Maximal assistance   Transfers   Sit to Stand Moderate Assistance   Stand to sit Minimal Assistance   Bed to Chair Moderate assistance;2 Person Assistance   Stand Pivot Transfers Moderate Assistance;2 Person Assistance   Comment assist x 2 for transfers for safety   Ambulation   Ambulation? No   Other Activities   Comment This morning, assisted patient to bedside commode, assisted with brief change and cleanup, then assisted patient to the recliner. This afternoon, assisted patient to the bedside commode and left with nursing to assist patient back to bed. All transfers were positioned appropriately for stand pivot towards patient's right side, assist with right weight shift to keep weight off left LE. Short term goals   Time Frame for Short term goals 2 WKS   Short term goal 1 SUP<>SIT, MOD A 1   Short term goal 2 SIT<>STAND, MIN A 1   Short term goal 3 PIVOT TF WITH MOD A 1   Short term goal 4 TF WITH RW AND MOD 1   Conditions Requiring Skilled Therapeutic Intervention   Body structures, Functions, Activity limitations Decreased functional mobility ; Decreased strength;Decreased safe awareness; Increased pain;Decreased balance;Decreased endurance;Decreased posture   Assessment Left patient with all needs in reach, pillows for comfort.           Electronically

## 2020-01-10 NOTE — PROGRESS NOTES
OutComes Score                                                  AM-PAC Score             Goals  Short term goals  Time Frame for Short term goals: 1-2 weeks  Short term goal 1: Perform toilet transfers with moderate assistance. Short term goal 2: Demo ability to stand and maintain TTWB with min/mod assist to promote improved clothing management for toileting and dressing. Short term goal 3: Pt/Caregiver to be independent with therapeutic exercises and positioning recommendations. Long term goals  Long term goal 1: Upgrade as tolerated.        Therapy Time   Individual Concurrent Group Co-treatment   Time In           Time Out           Minutes                   Krissy Bravo

## 2020-01-10 NOTE — PROGRESS NOTES
0234  Gross per 24 hour   Intake 340.33 ml   Output 550 ml   Net -209.67 ml       Medications:  Current Facility-Administered Medications   Medication Dose Route Frequency Provider Last Rate Last Dose    ceFEPIme (MAXIPIME) 2 g in sodium chloride (PF) 20 mL IV syringe  2 g Intravenous Q12H Kanwal Tamez MD   2 g at 01/10/20 0929    oxyCODONE-acetaminophen (PERCOCET) 5-325 MG per tablet 1 tablet  1 tablet Oral Q4H PRN Bud Edward MD        morphine injection 1 mg  1 mg Intravenous Q6H PRN Bud Edward MD        vancomycin [facility]) intermittent dosing (placeholder)   Other RX Jeanine Mo MD        vancomycin (VANCOCIN) 750 mg in dextrose 5 % 250 mL IVPB  750 mg Intravenous Q24H Bud Edward MD   Stopped at 01/09/20 1842    sodium chloride flush 0.9 % injection 10 mL  10 mL Intravenous 2 times per day Bud Edward MD   10 mL at 01/10/20 0929    sodium chloride flush 0.9 % injection 10 mL  10 mL Intravenous PRN Bud Edward MD        docusate sodium (COLACE) capsule 100 mg  100 mg Oral BID Bud Edward MD   100 mg at 01/10/20 0813    sennosides-docusate sodium (SENOKOT-S) 8.6-50 MG tablet 1 tablet  1 tablet Oral BID Bud Edward MD   1 tablet at 01/10/20 5837    acetaminophen (TYLENOL) tablet 650 mg  650 mg Oral Q4H PRN Bud Edward MD        oxyCODONE (ROXICODONE) immediate release tablet 5 mg  5 mg Oral Q4H PRN Bud Edward MD   5 mg at 01/09/20 4992    Or    oxyCODONE (ROXICODONE) immediate release tablet 10 mg  10 mg Oral Q4H PRN Bud Edward MD        morphine injection 2 mg  2 mg Intravenous Q2H PRN Bud Edward MD        Or    morphine injection 4 mg  4 mg Intravenous Q2H PRN Bud Edward MD        bisacodyl (DULCOLAX) suppository 10 mg  10 mg Rectal Daily PRN Bud Edward MD        apixaban Rich Gilliland) tablet 2.5 mg  2.5 mg Oral BID Bud Edward MD   2.5 mg at 01/10/20 2318    carvedilol (COREG) tablet 25 mg  25 mg Oral BID  Bud Edward MD   25 mg at 01/10/20 0877    clear. CT appearance of the head is unchanged. Impression: 1. No CT evidence of an acute intracranial process. Signed by Dr Morenita Wing on 1/7/2020 5:29 PM    Xr Chest Portable    Result Date: 1/7/2020  XR CHEST PORTABLE 1/7/2020 3:45 PM HISTORY:   Fall  Single view. COMPARISONS:  8/29/2019, 1/18/2018 and 12/17/2017 FINDINGS: There is mild diffuse perihilar interstitial prominence. The heart is generous. Mild pulmonary venous hypertension considered. There is no parenchymal infiltrates or evidence of overt heart failure. Bony structures are intact without acute osseous abdomen on his. Large hiatal hernia again observed. Generous heart size with diffuse mild perihilar interstitial prominence without parenchymal infiltrates or evidence of overt heart failure. Signed by Dr Morenita Wing on 1/7/2020 5:41 PM    Xr Hip 2-3 Vw W Pelvis Left    Result Date: 1/7/2020  EXAMINATION:  XR HIP 2-3 VW W PELVIS LEFT  1/7/2020 5:46 PM HISTORY: Left hip pain post fall COMPARISON: 1/8/2018 pelvis radiograph TECHNIQUE: 3 views: AP pelvis, AP and lateral left hip FINDINGS: There is a subcapital mildly impacted left femur neck fracture. There is diffuse osteoporosis. There are changes from right hip arthroplasty. No additional fractures observed. 1. Subcapital left femur neck fracture. Signed by Dr Morenita Wing on 1/7/2020 5:48 PM      2D Echo: 01/08/2020   Summary   Mild to moderate aortic stenosis is present. The aortic valve area is 0.8 cm2 with a maximum gradient of 25 mmHg and a   mean gradient of 14 mmHg. Mild aortic regurgitation is noted. Severe tricuspid regurgitation with estimated RVSP of 74 mm Hg. Normal left ventricular size and function. Moderate concentric left ventricular hypertrophy. E/A flow reversal noted. Suggestive of diastolic dysfunction.       Signature   ----------------------------------------------------------------   Electronically signed by Pierre Woodard hip due to pain. Intact ROM in bilateral upper extremity, as well as right lower extremity. Skin:     General: Skin is warm and dry. Capillary Refill: Capillary refill takes less than 2 seconds. Coloration: Skin is not jaundiced. Neurological:      General: No focal deficit present. Mental Status: She is alert. Cranial Nerves: No cranial nerve deficit. Sensory: No sensory deficit. Psychiatric:         Mood and Affect: Mood normal.         Behavior: Behavior normal.         Thought Content: Thought content normal.         Judgment: Judgment normal.           Assessment/plan:         Hospital Problems           Last Modified POA    * (Principal) Hip fracture requiring operative repair, left, closed, initial encounter ([facility] [address] 1/8/2020 Yes    Elevated brain natriuretic peptide (BNP) level 1/8/2020 Yes    Acute hypoxemic respiratory failure ([facility] [address] 1/8/2020 Yes    D-dimer, elevated 1/8/2020 Yes    Sepsis ([facility] [address] 1/8/2020 Yes    Hyperkalemia 1/8/2020 Yes    Essential hypertension 1/8/2020 Yes    Hyperlipidemia 1/8/2020 Yes            Acute closed traumatic valgus impacted minimally displaced subcapital left femoral neck fracture. · Status post reported mechanical fall. · Fracture likely related to fall and reported history of osteoporosis. · CT head with no acute intracranial pathology  · Orthopedics following  · Status post percutaneous screw fixation of left minimally displaced subcapital femoral head fracture. (01/08/2020)  · PT OT  · Continue symptomatic and supportive management, including pain management PRN. Sepsis  Leukocytosis, initial WBC of 19.4, with a left shift. Gram-positive bacteremia  · Source of infection likely pneumonia  · Afebrile  · Tachypneic, up to RR of 34  · Tachycardic, with HR up to 111. · Leukocytosis improved  · Initial blood cultures (drawn 01/08/2020) reportedly growing gram-positive cocci and pairs.   · Repeat blood cultures no growth to date  · Lactic

## 2020-01-10 NOTE — PROGRESS NOTES
Nutrition Assessment    Type and Reason for Visit: Reassess    Nutrition Recommendations: Liberalize diet to General. Modify ONS order to TID. Nutrition Assessment: Pt asleep at time of visit with visitors at bedside. Meal tray untouched. Will liberalize diet to General and modify ONS order to TID. Malnutrition Assessment:  · Malnutrition Status: At risk for malnutrition  · Context: Acute illness or injury  · Findings of the 6 clinical characteristics of malnutrition (Minimum of 2 out of 6 clinical characteristics is required to make the diagnosis of moderate or severe Protein Calorie Malnutrition based on AND/ASPEN Guidelines):  1. Energy Intake-Less than or equal to 50% of estimated energy requirement, Unable to assess    2. Weight Loss-No significant weight loss,    3. Fat Loss-No significant subcutaneous fat loss,    4. Muscle Loss-No significant muscle mass loss,    5. Fluid Accumulation-No significant fluid accumulation,    6.   Strength-Not measured    Nutrition Risk Level: High    Nutrient Needs:  · Estimated Daily Total Kcal: 7673-1832 kcals/day  · Estimated Daily Protein (g):  g/PRO/day  · Estimated Daily Total Fluid (ml/day): 2293-6783 mL/day    Nutrition Diagnosis:   · Problem: Inadequate oral intake  · Etiology: related to Acute injury/trauma     Signs and symptoms:  as evidenced by Intake 0-25%    Objective Information:  · Wound Type: Surgical Wound  · Current Nutrition Therapies:  · Oral Diet Orders: Renal   · Oral Diet intake: 0%  · Anthropometric Measures:  · Ht: 5' (152.4 cm)   · Current Body Wt: 107 lb (48.5 kg)  · BMI Classification: BMI 18.5 - 24.9 Normal Weight    Nutrition Interventions:   Modify current diet, Modify current ONS  Continued Inpatient Monitoring    Nutrition Evaluation:   · Evaluation: Goal achieved   · Goals: New goal: Pt will consume 50% or more of meals and supplements    · Monitoring: Nutrition Progression, Meal Intake, Supplement Intake, Skin

## 2020-01-11 LAB
ANION GAP SERPL CALCULATED.3IONS-SCNC: 13 MMOL/L (ref 7–19)
BASOPHILS ABSOLUTE: 0 K/UL (ref 0–0.2)
BASOPHILS RELATIVE PERCENT: 0.4 % (ref 0–1)
BUN BLDV-MCNC: 27 MG/DL (ref 8–23)
CALCIUM SERPL-MCNC: 8 MG/DL (ref 8.2–9.6)
CHLORIDE BLD-SCNC: 101 MMOL/L (ref 98–111)
CO2: 22 MMOL/L (ref 22–29)
CREAT SERPL-MCNC: 0.7 MG/DL (ref 0.5–0.9)
EOSINOPHILS ABSOLUTE: 0.4 K/UL (ref 0–0.6)
EOSINOPHILS RELATIVE PERCENT: 4.6 % (ref 0–5)
GFR NON-AFRICAN AMERICAN: >60
GLUCOSE BLD-MCNC: 106 MG/DL (ref 74–109)
HCT VFR BLD CALC: 28.8 % (ref 37–47)
HEMOGLOBIN: 9.2 G/DL (ref 12–16)
IMMATURE GRANULOCYTES #: 0.1 K/UL
LYMPHOCYTES ABSOLUTE: 0.6 K/UL (ref 1.1–4.5)
LYMPHOCYTES RELATIVE PERCENT: 6.8 % (ref 20–40)
MCH RBC QN AUTO: 36.1 PG (ref 27–31)
MCHC RBC AUTO-ENTMCNC: 31.9 G/DL (ref 33–37)
MCV RBC AUTO: 112.9 FL (ref 81–99)
MONOCYTES ABSOLUTE: 0.6 K/UL (ref 0–0.9)
MONOCYTES RELATIVE PERCENT: 7.8 % (ref 0–10)
NEUTROPHILS ABSOLUTE: 6.6 K/UL (ref 1.5–7.5)
NEUTROPHILS RELATIVE PERCENT: 79.8 % (ref 50–65)
PDW BLD-RTO: 11.4 % (ref 11.5–14.5)
PLATELET # BLD: 100 K/UL (ref 130–400)
PMV BLD AUTO: 13 FL (ref 9.4–12.3)
POTASSIUM SERPL-SCNC: 3.6 MMOL/L (ref 3.5–5)
PRO-BNP: 7167 PG/ML (ref 0–1800)
RBC # BLD: 2.55 M/UL (ref 4.2–5.4)
SODIUM BLD-SCNC: 136 MMOL/L (ref 136–145)
VANCOMYCIN TROUGH: 7.9 UG/ML (ref 10–20)
WBC # BLD: 8.3 K/UL (ref 4.8–10.8)

## 2020-01-11 PROCEDURE — 2580000003 HC RX 258: Performed by: INTERNAL MEDICINE

## 2020-01-11 PROCEDURE — 83880 ASSAY OF NATRIURETIC PEPTIDE: CPT

## 2020-01-11 PROCEDURE — 2580000003 HC RX 258: Performed by: ORTHOPAEDIC SURGERY

## 2020-01-11 PROCEDURE — 85025 COMPLETE CBC W/AUTO DIFF WBC: CPT

## 2020-01-11 PROCEDURE — 6370000000 HC RX 637 (ALT 250 FOR IP): Performed by: ORTHOPAEDIC SURGERY

## 2020-01-11 PROCEDURE — 80048 BASIC METABOLIC PNL TOTAL CA: CPT

## 2020-01-11 PROCEDURE — 80202 ASSAY OF VANCOMYCIN: CPT

## 2020-01-11 PROCEDURE — 2700000000 HC OXYGEN THERAPY PER DAY

## 2020-01-11 PROCEDURE — 6360000002 HC RX W HCPCS: Performed by: ORTHOPAEDIC SURGERY

## 2020-01-11 PROCEDURE — 36415 COLL VENOUS BLD VENIPUNCTURE: CPT

## 2020-01-11 PROCEDURE — 1210000000 HC MED SURG R&B

## 2020-01-11 PROCEDURE — 6360000002 HC RX W HCPCS: Performed by: INTERNAL MEDICINE

## 2020-01-11 PROCEDURE — 97530 THERAPEUTIC ACTIVITIES: CPT

## 2020-01-11 RX ADMIN — MORPHINE SULFATE 2 MG: 4 INJECTION, SOLUTION INTRAMUSCULAR; INTRAVENOUS at 21:13

## 2020-01-11 RX ADMIN — SODIUM CHLORIDE, PRESERVATIVE FREE 2 G: 5 INJECTION INTRAVENOUS at 21:13

## 2020-01-11 RX ADMIN — SENNOSIDES AND DOCUSATE SODIUM 1 TABLET: 8.6; 5 TABLET ORAL at 08:09

## 2020-01-11 RX ADMIN — DOCUSATE SODIUM 100 MG: 100 CAPSULE, LIQUID FILLED ORAL at 21:13

## 2020-01-11 RX ADMIN — OXYCODONE HYDROCHLORIDE 5 MG: 5 TABLET ORAL at 18:29

## 2020-01-11 RX ADMIN — SODIUM CHLORIDE, PRESERVATIVE FREE 2 G: 5 INJECTION INTRAVENOUS at 10:23

## 2020-01-11 RX ADMIN — SODIUM CHLORIDE, PRESERVATIVE FREE 10 ML: 5 INJECTION INTRAVENOUS at 21:14

## 2020-01-11 RX ADMIN — SENNOSIDES AND DOCUSATE SODIUM 1 TABLET: 8.6; 5 TABLET ORAL at 21:13

## 2020-01-11 RX ADMIN — APIXABAN 2.5 MG: 2.5 TABLET, FILM COATED ORAL at 08:09

## 2020-01-11 RX ADMIN — VANCOMYCIN HYDROCHLORIDE 750 MG: 750 INJECTION, POWDER, LYOPHILIZED, FOR SOLUTION INTRAVENOUS at 17:48

## 2020-01-11 RX ADMIN — DOCUSATE SODIUM 100 MG: 100 CAPSULE, LIQUID FILLED ORAL at 08:09

## 2020-01-11 RX ADMIN — SODIUM CHLORIDE, PRESERVATIVE FREE 10 ML: 5 INJECTION INTRAVENOUS at 08:09

## 2020-01-11 RX ADMIN — CARVEDILOL 25 MG: 25 TABLET, FILM COATED ORAL at 08:09

## 2020-01-11 RX ADMIN — APIXABAN 2.5 MG: 2.5 TABLET, FILM COATED ORAL at 21:13

## 2020-01-11 ASSESSMENT — PAIN SCALES - GENERAL
PAINLEVEL_OUTOF10: 4
PAINLEVEL_OUTOF10: 0
PAINLEVEL_OUTOF10: 9
PAINLEVEL_OUTOF10: 3

## 2020-01-11 ASSESSMENT — PAIN SCALES - WONG BAKER: WONGBAKER_NUMERICALRESPONSE: 4

## 2020-01-11 ASSESSMENT — PAIN DESCRIPTION - PROGRESSION: CLINICAL_PROGRESSION: GRADUALLY IMPROVING

## 2020-01-11 ASSESSMENT — PAIN DESCRIPTION - LOCATION: LOCATION: HIP

## 2020-01-11 ASSESSMENT — PAIN DESCRIPTION - PAIN TYPE: TYPE: ACUTE PAIN;SURGICAL PAIN

## 2020-01-11 ASSESSMENT — PAIN DESCRIPTION - FREQUENCY: FREQUENCY: INTERMITTENT

## 2020-01-11 ASSESSMENT — PAIN DESCRIPTION - ONSET: ONSET: GRADUAL

## 2020-01-11 ASSESSMENT — PAIN - FUNCTIONAL ASSESSMENT: PAIN_FUNCTIONAL_ASSESSMENT: PREVENTS OR INTERFERES SOME ACTIVE ACTIVITIES AND ADLS

## 2020-01-11 ASSESSMENT — PAIN DESCRIPTION - DIRECTION: RADIATING_TOWARDS: LEG

## 2020-01-11 ASSESSMENT — PAIN DESCRIPTION - ORIENTATION: ORIENTATION: LEFT

## 2020-01-11 ASSESSMENT — PAIN DESCRIPTION - DESCRIPTORS: DESCRIPTORS: DISCOMFORT

## 2020-01-11 NOTE — PROGRESS NOTES
Patient resting well, vs stable and cms+ to lle and palpable pedal pulse noted to lle, dressing d/i to left hip, no nausea and will cont to monitor Electronically signed by Meridee Jeans, RN on 1/11/2020 at 3:45 AM

## 2020-01-11 NOTE — PROGRESS NOTES
 apixaban (ELIQUIS) tablet 2.5 mg  2.5 mg Oral BID Elvi Ross MD   2.5 mg at 01/11/20 0809    carvedilol (COREG) tablet 25 mg  25 mg Oral BID  Elvi Ross MD   25 mg at 01/11/20 0809    ondansetron (ZOFRAN) injection 4 mg  4 mg Intravenous Q6H PRN Elvi Ross MD        potassium chloride 10 mEq/100 mL IVPB (Peripheral Line)  10 mEq Intravenous PRN Elvi Ross MD        magnesium sulfate 1 g in dextrose 5% 100 mL IVPB  1 g Intravenous PRN Elvi Ross MD        magnesium hydroxide (MILK OF MAGNESIA) 400 MG/5ML suspension 30 mL  30 mL Oral Daily PRN Elvi Ross MD               cefepime  2 g Intravenous Q12H    vancomycin (VANCOCIN) intermittent dosing (placeholder)   Other RX Placeholder    vancomycin  750 mg Intravenous Q24H    sodium chloride flush  10 mL Intravenous 2 times per day    docusate sodium  100 mg Oral BID    sennosides-docusate sodium  1 tablet Oral BID    apixaban  2.5 mg Oral BID    carvedilol  25 mg Oral BID      oxyCODONE-acetaminophen, morphine, sodium chloride flush, acetaminophen, oxyCODONE **OR** oxyCODONE, morphine **OR** morphine, bisacodyl, ondansetron, potassium chloride, magnesium sulfate, magnesium hydroxide  DIET GENERAL;  Dietary Nutrition Supplements: Standard High Calorie Oral Supplement       Labs:   CBC with DIFF:  Recent Labs     01/08/20  1624 01/09/20  0350 01/10/20  0425 01/11/20  0315   WBC 13.5*  --  10.4 8.3   RBC 3.01*  --  2.57* 2.55*   HGB 10.9* 10.4* 9.3* 9.2*   HCT 33.6* 32.4* 28.5* 28.8*   .6*  --  110.9* 112.9*   MCH 36.2*  --  36.2* 36.1*   MCHC 32.4*  --  32.6* 31.9*   RDW 11.7  --  11.4* 11.4*   *  --  95* 100*   MPV 12.8*  --  12.7* 13.0*   NEUTOPHILPCT 89.4*  --  84.4* 79.8*   LYMPHOPCT 3.7*  --  5.5* 6.8*   MONOPCT 3.8  --  5.5 7.8   EOSRELPCT 2.4  --  3.8 4.6   BASOPCT 0.3  --  0.3 0.4   NEUTROABS 12.0*  --  8.8* 6.6   LYMPHSABS 0.5*  --  0.6* 0.6*   MONOSABS 0.50  --  0.60 0.60   EOSABS 0.30  --  0.40 0.40   BASOSABS 0.00 --  0.00 0.00       CMP/BMP:  Recent Labs     01/09/20  0350 01/10/20  0425 01/11/20  0315    136 136   K 4.3 3.9 3.6    102 101   CO2 24 22 22   ANIONGAP 14 12 13   GLUCOSE 113* 105 106   BUN 30* 30* 27*   CREATININE 0.8 0.7 0.7   LABGLOM >60 >60 >60   CALCIUM 8.4 8.3 8.0*         CRP:  No results for input(s): CRP in the last 72 hours. Sed Rate:  No results for input(s): SEDRATE in the last 72 hours. HgBA1c:  No components found for: HGBA1C  FLP:  No results found for: TRIG, HDL, LDLCALC, LDLDIRECT, LABVLDL  TSH:    Lab Results   Component Value Date    TSH 2.160 12/17/2017     Troponin T:   No results for input(s): TROPONINI in the last 72 hours. Pro-BNP: No results for input(s): BNP in the last 72 hours. INR:   No results for input(s): INR in the last 72 hours. ABGs:   Lab Results   Component Value Date    PHART 7.420 01/07/2020    PO2ART 45.0 01/07/2020    IZK0ATX 36.0 01/07/2020     UA:  No results for input(s): NITRITE, COLORU, PHUR, LABCAST, WBCUA, RBCUA, MUCUS, TRICHOMONAS, YEAST, BACTERIA, CLARITYU, SPECGRAV, LEUKOCYTESUR, UROBILINOGEN, BILIRUBINUR, BLOODU, GLUCOSEU, AMORPHOUS in the last 72 hours. Invalid input(s): Yaneth Ar      Culture Results:    No results for input(s): CXSURG in the last 720 hours. Blood Culture Recent:   Recent Labs     01/09/20  1143 01/08/20  1624   BC No Growth to date. Any change in status will be called. Bottle volume = 8 ml  MIXED SKIN DURAN INCLUDING  *  No further workup  Isolated from one set only. Susceptibility testing is not  routinely done as these organism frequently represents skin  contamination in blood cultures. If testing is indicated,  please contact Lab within 7 days. Isolated from Aerobic and Anaerobic bottles    No further workup  Isolated from Aerobic and Anaerobic bottles  Isolated from one set only. Susceptibility testing is not  routinely done as these organism frequently represents skin  contamination in blood cultures.  If hiatal hernia again observed. Generous heart size with diffuse mild perihilar interstitial prominence without parenchymal infiltrates or evidence of overt heart failure. Signed by Dr Marine Benavides on 1/7/2020 5:41 PM    Xr Hip 2-3 Vw W Pelvis Left    Result Date: 1/7/2020  EXAMINATION:  XR HIP 2-3 VW W PELVIS LEFT  1/7/2020 5:46 PM HISTORY: Left hip pain post fall COMPARISON: 1/8/2018 pelvis radiograph TECHNIQUE: 3 views: AP pelvis, AP and lateral left hip FINDINGS: There is a subcapital mildly impacted left femur neck fracture. There is diffuse osteoporosis. There are changes from right hip arthroplasty. No additional fractures observed. 1. Subcapital left femur neck fracture. Signed by Dr Marine Benavides on 1/7/2020 5:48 PM      2D Echo: 01/08/2020   Summary   Mild to moderate aortic stenosis is present. The aortic valve area is 0.8 cm2 with a maximum gradient of 25 mmHg and a   mean gradient of 14 mmHg. Mild aortic regurgitation is noted. Severe tricuspid regurgitation with estimated RVSP of 74 mm Hg. Normal left ventricular size and function. Moderate concentric left ventricular hypertrophy. E/A flow reversal noted. Suggestive of diastolic dysfunction. Signature   ----------------------------------------------------------------   Electronically signed by Pam Cates MD(Interpreting   physician) on 01/08/2020 10:22 AM   ----------------------------------------------------------------           Objective:   Vitals: BP 98/64   Pulse 73   Temp 98.8 °F (37.1 °C) (Temporal)   Resp 16   Ht 5' (1.524 m)   Wt 107 lb 11.2 oz (48.9 kg)   SpO2 97%   BMI 21.03 kg/m²   24HR INTAKE/OUTPUT:      Intake/Output Summary (Last 24 hours) at 1/11/2020 1230  Last data filed at 1/11/2020 0553  Gross per 24 hour   Intake 1176 ml   Output 675 ml   Net 501 ml       Physical Exam  Vitals signs and nursing note reviewed. Constitutional:       General: She is not in acute distress.      Appearance: She is not ill-appearing, toxic-appearing or diaphoretic. HENT:      Head: Normocephalic and atraumatic. Right Ear: External ear normal.      Left Ear: External ear normal.      Nose: Nose normal. No congestion or rhinorrhea. Mouth/Throat:      Mouth: Mucous membranes are moist.   Eyes:      General: No scleral icterus. Right eye: No discharge. Left eye: No discharge. Extraocular Movements: Extraocular movements intact. Conjunctiva/sclera: Conjunctivae normal.      Pupils: Pupils are equal, round, and reactive to light. Neck:      Musculoskeletal: Normal range of motion and neck supple. No neck rigidity or muscular tenderness. Cardiovascular:      Rate and Rhythm: Normal rate and regular rhythm. Pulses: Normal pulses. Heart sounds: Murmur present. Pulmonary:      Effort: Pulmonary effort is normal. No respiratory distress. Breath sounds: Normal breath sounds. No wheezing or rales. Chest:      Chest wall: No tenderness. Abdominal:      General: Bowel sounds are normal. There is no distension. Palpations: Abdomen is soft. Tenderness: There is no tenderness. Musculoskeletal:         General: No swelling or tenderness. Right lower leg: No edema. Left lower leg: No edema. Comments: Decreased range of motion in left hip due to pain. Intact ROM in bilateral upper extremity, as well as right lower extremity. Skin:     General: Skin is warm and dry. Capillary Refill: Capillary refill takes less than 2 seconds. Coloration: Skin is not jaundiced. Neurological:      General: No focal deficit present. Mental Status: She is alert. Cranial Nerves: No cranial nerve deficit. Sensory: No sensory deficit. Psychiatric:         Mood and Affect: Mood normal.         Behavior: Behavior normal.         Thought Content:  Thought content normal.         Judgment: Judgment normal.           Assessment/plan:         Hospital Problems Last Modified POA    * (Principal) Hip fracture requiring operative repair, left, closed, initial encounter ([facility] [address] 1/8/2020 Yes    Elevated brain natriuretic peptide (BNP) level 1/8/2020 Yes    Acute hypoxemic respiratory failure ([facility] [address] 1/8/2020 Yes    D-dimer, elevated 1/8/2020 Yes    Sepsis ([facility] [address] 1/8/2020 Yes    Hyperkalemia 1/8/2020 Yes    Essential hypertension 1/8/2020 Yes    Hyperlipidemia 1/8/2020 Yes            Acute closed traumatic valgus impacted minimally displaced subcapital left femoral neck fracture. · Status post reported mechanical fall. · Fracture likely related to fall and reported history of osteoporosis. · CT head with no acute intracranial pathology  · Orthopedics following  · Status post percutaneous screw fixation of left minimally displaced subcapital femoral head fracture. (01/08/2020)  · PT OT  · Continue symptomatic and supportive management, including pain management PRN. Sepsis  Leukocytosis, initial WBC of 19.4, with a left shift. Gram-positive bacteremia  · Source of infection likely pneumonia  · Afebrile  · Tachypneic, up to RR of 34, on presentation  · Tachycardic, with HR up to 111, on presentation. · Leukocytosis improved  · Initial blood cultures (drawn 01/08/2020) reportedly growing alpha hemolytic Streptococcus, - mixed skin lamont, no further work-up recommended  · Repeat blood cultures no growth to date  · Lactic acid level within lab reference range  · Elevated Procalcitonin level (01/08/2020) : 5.33  <=0.07 ng/mL  · Initial Chest x-ray unremarkable  · CTA (01/08/2020): Impression: No evidence of PE or acute aortic pathology. Consolidation in the lung bases, left greater than right, concerning for pneumonia. Small bilateral pleural effusions, right greater than left. Cardiomegaly. Atherosclerosis of the aorta and coronary arteries. Moderate-sized hiatal hernia.   · UA unremarkable  · Continue empiric antibiotics (vancomycin and cefepime)  · Consider

## 2020-01-11 NOTE — PROGRESS NOTES
Physical Therapy  Jose M Westbrook  [id number]     01/11/20 1150   Subjective   Subjective Attempt, patient did not want to get OOB at this time. Will cont to follow.    Electronically signed by Rehana Hernandez PTA on 1/11/2020 at 11:51 AM

## 2020-01-12 LAB
ANION GAP SERPL CALCULATED.3IONS-SCNC: 12 MMOL/L (ref 7–19)
BASOPHILS ABSOLUTE: 0 K/UL (ref 0–0.2)
BASOPHILS RELATIVE PERCENT: 0.5 % (ref 0–1)
BUN BLDV-MCNC: 25 MG/DL (ref 8–23)
CALCIUM SERPL-MCNC: 8 MG/DL (ref 8.2–9.6)
CHLORIDE BLD-SCNC: 104 MMOL/L (ref 98–111)
CO2: 22 MMOL/L (ref 22–29)
CREAT SERPL-MCNC: 0.6 MG/DL (ref 0.5–0.9)
EOSINOPHILS ABSOLUTE: 0.5 K/UL (ref 0–0.6)
EOSINOPHILS RELATIVE PERCENT: 6.1 % (ref 0–5)
GFR NON-AFRICAN AMERICAN: >60
GLUCOSE BLD-MCNC: 100 MG/DL (ref 74–109)
HCT VFR BLD CALC: 26.6 % (ref 37–47)
HEMOGLOBIN: 8.6 G/DL (ref 12–16)
IMMATURE GRANULOCYTES #: 0 K/UL
LYMPHOCYTES ABSOLUTE: 1 K/UL (ref 1.1–4.5)
LYMPHOCYTES RELATIVE PERCENT: 12.5 % (ref 20–40)
MCH RBC QN AUTO: 35.4 PG (ref 27–31)
MCHC RBC AUTO-ENTMCNC: 32.3 G/DL (ref 33–37)
MCV RBC AUTO: 109.5 FL (ref 81–99)
MONOCYTES ABSOLUTE: 0.9 K/UL (ref 0–0.9)
MONOCYTES RELATIVE PERCENT: 10.9 % (ref 0–10)
NEUTROPHILS ABSOLUTE: 5.4 K/UL (ref 1.5–7.5)
NEUTROPHILS RELATIVE PERCENT: 69.5 % (ref 50–65)
PDW BLD-RTO: 11.3 % (ref 11.5–14.5)
PLATELET # BLD: 100 K/UL (ref 130–400)
PMV BLD AUTO: 12.5 FL (ref 9.4–12.3)
POTASSIUM SERPL-SCNC: 3.6 MMOL/L (ref 3.5–5)
RBC # BLD: 2.43 M/UL (ref 4.2–5.4)
SODIUM BLD-SCNC: 138 MMOL/L (ref 136–145)
WBC # BLD: 7.8 K/UL (ref 4.8–10.8)

## 2020-01-12 PROCEDURE — 6360000002 HC RX W HCPCS: Performed by: INTERNAL MEDICINE

## 2020-01-12 PROCEDURE — 6370000000 HC RX 637 (ALT 250 FOR IP): Performed by: ORTHOPAEDIC SURGERY

## 2020-01-12 PROCEDURE — 85025 COMPLETE CBC W/AUTO DIFF WBC: CPT

## 2020-01-12 PROCEDURE — 36415 COLL VENOUS BLD VENIPUNCTURE: CPT

## 2020-01-12 PROCEDURE — 2580000003 HC RX 258: Performed by: ORTHOPAEDIC SURGERY

## 2020-01-12 PROCEDURE — 84145 PROCALCITONIN (PCT): CPT

## 2020-01-12 PROCEDURE — 97530 THERAPEUTIC ACTIVITIES: CPT

## 2020-01-12 PROCEDURE — 1210000000 HC MED SURG R&B

## 2020-01-12 PROCEDURE — 80048 BASIC METABOLIC PNL TOTAL CA: CPT

## 2020-01-12 PROCEDURE — 97110 THERAPEUTIC EXERCISES: CPT

## 2020-01-12 RX ORDER — LEVOFLOXACIN 5 MG/ML
750 INJECTION, SOLUTION INTRAVENOUS
Status: DISCONTINUED | OUTPATIENT
Start: 2020-01-12 | End: 2020-01-13 | Stop reason: HOSPADM

## 2020-01-12 RX ORDER — LEVOFLOXACIN 500 MG/1
750 TABLET, FILM COATED ORAL DAILY
Qty: 5 TABLET | Refills: 0 | Status: SHIPPED | OUTPATIENT
Start: 2020-01-12 | End: 2020-01-15

## 2020-01-12 RX ORDER — SENNA AND DOCUSATE SODIUM 50; 8.6 MG/1; MG/1
1 TABLET, FILM COATED ORAL 2 TIMES DAILY
Qty: 60 TABLET | Refills: 0 | Status: SHIPPED | OUTPATIENT
Start: 2020-01-12 | End: 2020-02-11

## 2020-01-12 RX ORDER — LEVOFLOXACIN 5 MG/ML
500 INJECTION, SOLUTION INTRAVENOUS EVERY 24 HOURS
Status: DISCONTINUED | OUTPATIENT
Start: 2020-01-12 | End: 2020-01-12 | Stop reason: DRUGHIGH

## 2020-01-12 RX ORDER — OXYCODONE HYDROCHLORIDE 5 MG/1
5 TABLET ORAL EVERY 4 HOURS PRN
Qty: 18 TABLET | Refills: 0 | Status: SHIPPED | OUTPATIENT
Start: 2020-01-12 | End: 2020-01-15

## 2020-01-12 RX ADMIN — CARVEDILOL 25 MG: 25 TABLET, FILM COATED ORAL at 07:37

## 2020-01-12 RX ADMIN — SENNOSIDES AND DOCUSATE SODIUM 1 TABLET: 8.6; 5 TABLET ORAL at 07:37

## 2020-01-12 RX ADMIN — APIXABAN 2.5 MG: 2.5 TABLET, FILM COATED ORAL at 07:37

## 2020-01-12 RX ADMIN — DOCUSATE SODIUM 100 MG: 100 CAPSULE, LIQUID FILLED ORAL at 07:37

## 2020-01-12 RX ADMIN — OXYCODONE HYDROCHLORIDE 5 MG: 5 TABLET ORAL at 19:18

## 2020-01-12 RX ADMIN — SENNOSIDES AND DOCUSATE SODIUM 1 TABLET: 8.6; 5 TABLET ORAL at 19:18

## 2020-01-12 RX ADMIN — DOCUSATE SODIUM 100 MG: 100 CAPSULE, LIQUID FILLED ORAL at 19:18

## 2020-01-12 RX ADMIN — SODIUM CHLORIDE, PRESERVATIVE FREE 10 ML: 5 INJECTION INTRAVENOUS at 07:40

## 2020-01-12 RX ADMIN — LEVOFLOXACIN 750 MG: 5 INJECTION, SOLUTION INTRAVENOUS at 11:46

## 2020-01-12 RX ADMIN — CARVEDILOL 25 MG: 25 TABLET, FILM COATED ORAL at 17:46

## 2020-01-12 RX ADMIN — APIXABAN 2.5 MG: 2.5 TABLET, FILM COATED ORAL at 19:18

## 2020-01-12 ASSESSMENT — PAIN DESCRIPTION - LOCATION
LOCATION: HIP
LOCATION: HIP

## 2020-01-12 ASSESSMENT — PAIN SCALES - GENERAL
PAINLEVEL_OUTOF10: 0
PAINLEVEL_OUTOF10: 4
PAINLEVEL_OUTOF10: 0

## 2020-01-12 ASSESSMENT — PAIN DESCRIPTION - ORIENTATION
ORIENTATION: LEFT
ORIENTATION: LEFT

## 2020-01-12 ASSESSMENT — PAIN DESCRIPTION - ONSET: ONSET: GRADUAL

## 2020-01-12 ASSESSMENT — PAIN DESCRIPTION - PROGRESSION: CLINICAL_PROGRESSION: GRADUALLY IMPROVING

## 2020-01-12 ASSESSMENT — PAIN DESCRIPTION - DIRECTION: RADIATING_TOWARDS: LEG

## 2020-01-12 ASSESSMENT — PAIN DESCRIPTION - PAIN TYPE
TYPE: ACUTE PAIN;SURGICAL PAIN
TYPE: ACUTE PAIN;SURGICAL PAIN

## 2020-01-12 ASSESSMENT — PAIN - FUNCTIONAL ASSESSMENT
PAIN_FUNCTIONAL_ASSESSMENT: PREVENTS OR INTERFERES SOME ACTIVE ACTIVITIES AND ADLS
PAIN_FUNCTIONAL_ASSESSMENT: PREVENTS OR INTERFERES SOME ACTIVE ACTIVITIES AND ADLS

## 2020-01-12 ASSESSMENT — PAIN SCALES - WONG BAKER: WONGBAKER_NUMERICALRESPONSE: 0

## 2020-01-12 ASSESSMENT — PAIN DESCRIPTION - DESCRIPTORS: DESCRIPTORS: DISCOMFORT

## 2020-01-12 ASSESSMENT — PAIN DESCRIPTION - FREQUENCY: FREQUENCY: INTERMITTENT

## 2020-01-12 NOTE — CARE COORDINATION
SW received call from nursing in regards to pt's dc plan. Attempted to reach out to pt's children (listed emergency contacts) by phone and no answer. Pt's wish is to return home w/ 24/7 family support and home health for additional therapy/support. Nursing informed SW that they've discussed w/ pt and family the risks/benefits of pt returning home w/ HH vs dc to SNF. Still plan to return home. MD notified for [facility] orders and SW will continue to follow.     Electronically signed by Alan Torres on 1/12/2020 at 11:42 AM

## 2020-01-12 NOTE — DISCHARGE SUMMARY
Jeronimo Higgins  :  1926  MRN:  [id number]    Admit date:  2020  Discharge date:  2020       Admitting Physician:  Ishmael Maurer MD    Advance Directive: Full Code    Consults Made:   IP CONSULT TO ORTHOPEDIC SURGERY  IP CONSULT TO HOSPITALIST  IP CONSULT TO CARDIOLOGY  IP CONSULT TO DIETITIAN  IP CONSULT TO SOCIAL WORK  IP CONSULT TO SOCIAL WORK  IP CONSULT TO HOME CARE NEEDS  IP CONSULT TO [address] [place] [address]      Primary Care Physician:  Filemon Ken APRN - NP    Discharge Diagnoses:  Principal Problem:    Hip fracture requiring operative repair, left, closed, initial encounter [facility])  Active Problems:    Elevated brain natriuretic peptide (BNP) level    Acute hypoxemic respiratory failure (HCC)    D-dimer, elevated    Sepsis ([address]    Hyperkalemia    Essential hypertension    Hyperlipidemia  Resolved Problems:    * No resolved hospital problems. *            Pertinent Labs:  CBC with DIFF:  Recent Labs     01/10/20  0425 01/11/20  0315 01/12/20  0321   WBC 10.4 8.3 7.8   RBC 2.57* 2.55* 2.43*   HGB 9.3* 9.2* 8.6*   HCT 28.5* 28.8* 26.6*   .9* 112.9* 109.5*   MCH 36.2* 36.1* 35.4*   MCHC 32.6* 31.9* 32.3*   RDW 11.4* 11.4* 11.3*   PLT 95* 100* 100*   MPV 12.7* 13.0* 12.5*   NEUTOPHILPCT 84.4* 79.8* 69.5*   LYMPHOPCT 5.5* 6.8* 12.5*   MONOPCT 5.5 7.8 10.9*   EOSRELPCT 3.8 4.6 6.1*   BASOPCT 0.3 0.4 0.5   NEUTROABS 8.8* 6.6 5.4   LYMPHSABS 0.6* 0.6* 1.0*   MONOSABS 0.60 0.60 0.90   EOSABS 0.40 0.40 0.50   BASOSABS 0.00 0.00 0.00       CMP/BMP:  Recent Labs     01/10/20  0425 01/11/20  0315 01/12/20  0321    136 138   K 3.9 3.6 3.6    101 104   CO2 22 22 22   ANIONGAP 12 13 12   GLUCOSE 105 106 100   BUN 30* 27* 25*   CREATININE 0.7 0.7 0.6   LABGLOM >60 >60 >60   CALCIUM 8.3 8.0* 8.0*         CRP:  No results for input(s): CRP in the last 72 hours. Sed Rate:  No results for input(s): SEDRATE in the last 72 hours.       HgBA1c:  No components found for: HGBA1C  FLP:  No results HEAD WO CONTRAST 1/7/2020 4:00 PM History: Head pain post fall Comparisons: 10/5/2019 head CT Technique: Radiation dose equals  mGy cm. AUTOMATED EXPOSURE CONTROL dose reduction technique was implemented CT evaluation of the head without intravenous contrast. 5 mm transaxial images were obtained. 2-D sagittal and coronal reconstruction images were generated. Findings: There is central and cortical atrophy. There is low attenuation in the periventricular and subcortical white matter, chronic ischemic changes. There is no intra or extra-axial hemorrhage. There is no mass effect or midline shift. There is no hydrocephalus. There is no skull fracture acute calvarial abnormality. Paranasal sinuses imaged incompletely are clear. CT appearance of the head is unchanged. Impression: 1. No CT evidence of an acute intracranial process. Signed by Dr Chris Keith on 1/7/2020 5:29 PM    Xr Chest Portable    Result Date: 1/7/2020  XR CHEST PORTABLE 1/7/2020 3:45 PM HISTORY:   Fall  Single view. COMPARISONS:  8/29/2019, 1/18/2018 and 12/17/2017 FINDINGS: There is mild diffuse perihilar interstitial prominence. The heart is generous. Mild pulmonary venous hypertension considered. There is no parenchymal infiltrates or evidence of overt heart failure. Bony structures are intact without acute osseous abdomen on his. Large hiatal hernia again observed. Generous heart size with diffuse mild perihilar interstitial prominence without parenchymal infiltrates or evidence of overt heart failure.  Signed by Dr Chris Keith on 1/7/2020 5:41 PM    Cta Pulmonary W Contrast    Result Date: 1/8/2020  EXAMINATION: CTA PULMONARY W CONTRAST 1/8/2020 4:25 PM HISTORY: Elevated d-dimer , evaluate for PE Comparison: Chest x-ray 1/7/2020 Technique: Sequential imaging was performed from the thoracic inlet through the upper abdomen after the administration of IV contrast. Sagittal and coronal reformations were made from pneumonia. 3. Small bilateral pleural effusions, right greater than left. 4. Cardiomegaly. Atherosclerosis of the aorta and coronary arteries. 4. Moderate-sized hiatal hernia. Signed by Dr Marina Johnston on 1/8/2020 4:34 PM    Xr Hip 2-3 Vw W Pelvis Left    Result Date: 1/8/2020  EXAMINATION:  XR HIP 2-3 VW W PELVIS LEFT  1/8/2020 10:21 PM HISTORY: Femoral neck pinning COMPARISON: No comparison study. TECHNIQUE: Image number: 2 Fluoroscopy time: 36 seconds. Dose: 3.1627 mGy FINDINGS: AP and lateral projection C-arm images from femoral neck pinning identified. 3 pins traversing the subcapital femoral neck fracture. Examination is available for review. Signed by Dr Sophy Landa on 1/8/2020 10:23 PM    Xr Hip 2-3 Vw W Pelvis Left    Result Date: 1/7/2020  EXAMINATION:  XR HIP 2-3 VW W PELVIS LEFT  1/7/2020 5:46 PM HISTORY: Left hip pain post fall COMPARISON: 1/8/2018 pelvis radiograph TECHNIQUE: 3 views: AP pelvis, AP and lateral left hip FINDINGS: There is a subcapital mildly impacted left femur neck fracture. There is diffuse osteoporosis. There are changes from right hip arthroplasty. No additional fractures observed. 1. Subcapital left femur neck fracture. Signed by Dr Sophy Landa on 1/7/2020 5:48 PM    2D Echo: 01/08/2020   Summary   Mild to moderate aortic stenosis is present.   The aortic valve area is 0.8 cm2 with a maximum gradient of 25 mmHg and a   mean gradient of 14 mmHg.   Mild aortic regurgitation is noted.   Severe tricuspid regurgitation with estimated RVSP of 74 mm Hg.   Normal left ventricular size and function.   Moderate concentric left ventricular hypertrophy.  E/A flow reversal noted.  Suggestive of diastolic dysfunction.      Signature   ----------------------------------------------------------------   Electronically signed by Polina Marie MD(Interpreting   [id number]) on 01/08/2020 10:22 AM   ----------------------------------------------------------------       Hospital Course:   Ms. Denyse Meckel, a 51-year-old female, hx of HLD, HTN, presenting to the ED (01/07/2020), on account of acute onset of left hip pain, after a reported mechanical fall.     Leukocytosis noted on initial presentation, with a WBC of 19.4, however with no obvious source of infection. X-ray of the hip, reporting concern for subcapital left femoral neck fracture. Orthopedics consulted from ED. Patient was subsequently admitted for further work-up and management. Acute closed traumatic valgus impacted minimally displaced subcapital left femoral neck fracture. · Status post reported mechanical fall. · Fracture likely related to fall and reported history of osteoporosis. · CT head with no acute intracranial pathology  · Orthopedics following  ? Status post percutaneous screw fixation of left minimally displaced subcapital femoral head fracture. (01/08/2020)  · PT OT  · Continued symptomatic and supportive management, including pain management PRN.       Sepsis  Leukocytosis, initial WBC of 19.4, with a left shift. Gram-positive bacteremia -contaminant  · Source of infection likely pneumonia  · Afebrile  · Tachypneic, up to RR of 34, on presentation  · Tachycardic, with HR up to 111, on presentation. · Leukocytosis improved  · Initial blood cultures (drawn 01/08/2020) reportedly growing alpha hemolytic Streptococcus, - mixed skin lamont, no further work-up recommended  · Repeat blood cultures no growth to date  · Lactic acid level within lab reference range  · Elevated Procalcitonin level (01/08/2020) : 5.33  <=0.07 ng/mL  · Initial Chest x-ray unremarkable  · CTA (01/08/2020): Impression: No evidence of PE or acute aortic pathology. Consolidation in the lung bases, left greater than right, concerning for pneumonia. Small bilateral pleural effusions, right greater than left. Cardiomegaly. Atherosclerosis of the aorta and coronary arteries. Moderate-sized hiatal hernia.   · UA unremarkable  · Initially started empirically

## 2020-01-12 NOTE — PROGRESS NOTES
Patient resting well vs stable and cms+lle and palpable pedal pulse noted to lle, mepilex dressing d/i to left hip, pain control noted well, will cont to monitor Electronically signed by Kendall Miles RN on 1/11/2020 at 7:55 PM

## 2020-01-12 NOTE — PROGRESS NOTES
morphine injection 4 mg  4 mg Intravenous Q2H PRN Debbie Blackwell MD        bisacodyl (DULCOLAX) suppository 10 mg  10 mg Rectal Daily PRN Debbie Blackwell MD        apixaban Mendel Lied) tablet 2.5 mg  2.5 mg Oral BID Debbie Blackwell MD   2.5 mg at 01/12/20 0737    carvedilol (COREG) tablet 25 mg  25 mg Oral BID WC Debbie Blackwell MD   25 mg at 01/12/20 0737    ondansetron (ZOFRAN) injection 4 mg  4 mg Intravenous Q6H PRN Debbie Blackwell MD        potassium chloride 10 mEq/100 mL IVPB (Peripheral Line)  10 mEq Intravenous PRN Debbie Blackwell MD        magnesium sulfate 1 g in dextrose 5% 100 mL IVPB  1 g Intravenous PRN Debbie Blackwell MD        magnesium hydroxide (MILK OF MAGNESIA) 400 MG/5ML suspension 30 mL  30 mL Oral Daily PRN Debbie Blackwell MD               levofloxacin  500 mg Intravenous Q24H    vancomycin (VANCOCIN) intermittent dosing (placeholder)   Other RX Placeholder    sodium chloride flush  10 mL Intravenous 2 times per day    docusate sodium  100 mg Oral BID    sennosides-docusate sodium  1 tablet Oral BID    apixaban  2.5 mg Oral BID    carvedilol  25 mg Oral BID      oxyCODONE-acetaminophen, morphine, sodium chloride flush, acetaminophen, oxyCODONE **OR** oxyCODONE, morphine **OR** morphine, bisacodyl, ondansetron, potassium chloride, magnesium sulfate, magnesium hydroxide  DIET GENERAL;  Dietary Nutrition Supplements: Standard High Calorie Oral Supplement       Labs:   CBC with DIFF:  Recent Labs     01/10/20  0425 01/11/20  0315 01/12/20  0321   WBC 10.4 8.3 7.8   RBC 2.57* 2.55* 2.43*   HGB 9.3* 9.2* 8.6*   HCT 28.5* 28.8* 26.6*   .9* 112.9* 109.5*   MCH 36.2* 36.1* 35.4*   MCHC 32.6* 31.9* 32.3*   RDW 11.4* 11.4* 11.3*   PLT 95* 100* 100*   MPV 12.7* 13.0* 12.5*   NEUTOPHILPCT 84.4* 79.8* 69.5*   LYMPHOPCT 5.5* 6.8* 12.5*   MONOPCT 5.5 7.8 10.9*   EOSRELPCT 3.8 4.6 6.1*   BASOPCT 0.3 0.4 0.5   NEUTROABS 8.8* 6.6 5.4   LYMPHSABS 0.6* 0.6* 1.0*   MONOSABS 0.60 0.60 0.90   EOSABS 0.40 0.40 0.50 BASOSABS 0.00 0.00 0.00       CMP/BMP:  Recent Labs     01/10/20  0425 01/11/20  0315 01/12/20  0321    136 138   K 3.9 3.6 3.6    101 104   CO2 22 22 22   ANIONGAP 12 13 12   GLUCOSE 105 106 100   BUN 30* 27* 25*   CREATININE 0.7 0.7 0.6   LABGLOM >60 >60 >60   CALCIUM 8.3 8.0* 8.0*         CRP:  No results for input(s): CRP in the last 72 hours. Sed Rate:  No results for input(s): SEDRATE in the last 72 hours. HgBA1c:  No components found for: HGBA1C  FLP:  No results found for: TRIG, HDL, LDLCALC, LDLDIRECT, LABVLDL  TSH:    Lab Results   Component Value Date    TSH 2.160 12/17/2017     Troponin T:   No results for input(s): TROPONINI in the last 72 hours. Pro-BNP: No results for input(s): BNP in the last 72 hours. INR:   No results for input(s): INR in the last 72 hours. ABGs:   Lab Results   Component Value Date    PHART 7.420 01/07/2020    PO2ART 45.0 01/07/2020    NCM3VHR 36.0 01/07/2020     UA:  No results for input(s): NITRITE, COLORU, PHUR, LABCAST, WBCUA, RBCUA, MUCUS, TRICHOMONAS, YEAST, BACTERIA, CLARITYU, SPECGRAV, LEUKOCYTESUR, UROBILINOGEN, BILIRUBINUR, BLOODU, GLUCOSEU, AMORPHOUS in the last 72 hours. Invalid input(s): Genoveva Henderson      Culture Results:    No results for input(s): CXSURG in the last 720 hours. Blood Culture Recent:   Recent Labs     01/09/20  1143 01/08/20  1624   BC No Growth to date. Any change in status will be called. Bottle volume = 8 ml  MIXED SKIN DURAN INCLUDING  *  No further workup  Isolated from one set only. Susceptibility testing is not  routinely done as these organism frequently represents skin  contamination in blood cultures. If testing is indicated,  please contact Lab within 7 days. Isolated from Aerobic and Anaerobic bottles    No further workup  Isolated from Aerobic and Anaerobic bottles  Isolated from one set only.   Susceptibility testing is not  routinely done as these organism frequently represents skin  contamination in blood cultures. If testing is indicated,  please contact Lab within 7 days. Cultures:   No results for input(s): CULTURE in the last 72 hours. Recent Labs     01/09/20  1143 01/09/20  1204   BC No Growth to date. Any change in status will be called. --    BLOODCULT2  --  No Growth to date. Any change in status will be called. No results for input(s): CXSURG in the last 72 hours. No results for input(s): MG, PHOS in the last 72 hours. No results for input(s): AST, ALT, ALB, BILITOT, ALKPHOS, ALB in the last 72 hours. RAD:   Ct Head Wo Contrast    Result Date: 1/7/2020  CT HEAD WO CONTRAST 1/7/2020 4:00 PM History: Head pain post fall Comparisons: 10/5/2019 head CT Technique: Radiation dose equals  mGy cm. AUTOMATED EXPOSURE CONTROL dose reduction technique was implemented CT evaluation of the head without intravenous contrast. 5 mm transaxial images were obtained. 2-D sagittal and coronal reconstruction images were generated. Findings: There is central and cortical atrophy. There is low attenuation in the periventricular and subcortical white matter, chronic ischemic changes. There is no intra or extra-axial hemorrhage. There is no mass effect or midline shift. There is no hydrocephalus. There is no skull fracture acute calvarial abnormality. Paranasal sinuses imaged incompletely are clear. CT appearance of the head is unchanged. Impression: 1. No CT evidence of an acute intracranial process. Signed by Dr Kianna Page on 1/7/2020 5:29 PM    Xr Chest Portable    Result Date: 1/7/2020  XR CHEST PORTABLE 1/7/2020 3:45 PM HISTORY:   Fall  Single view. COMPARISONS:  8/29/2019, 1/18/2018 and 12/17/2017 FINDINGS: There is mild diffuse perihilar interstitial prominence. The heart is generous. Mild pulmonary venous hypertension considered. There is no parenchymal infiltrates or evidence of overt heart failure.  Bony structures are intact without acute osseous abdomen on 01/08/2020  · Will de-escalating antibiotics, to Levofloxacin, for a total of 7 day course (End date: 01/14/2020)  · Monitor CBC with differential      Acute hypoxemic respiratory failure  Elevated proBNP  HFpEF  Elevated d-dimer  · Initial chest x-ray (01/07/2020) \"generous heart size with diffuse mild perihilar interstitial  prominence without parenchymal infiltrates or evidence of overt heart failure. \"  · Hypoxic, initial presentation, with initial SPO2 of 82 mmHg  · Hypoxemic with PaO2 of 45 mmHg on initial ABG (01/07/2020)  · CTA (01/08/2020): Impression: No evidence of PE or acute aortic pathology. Consolidation in the lung bases, left greater than right, concerning for pneumonia. Small bilateral pleural effusions, right greater than left. Cardiomegaly. Atherosclerosis of the aorta and coronary arteries. Moderate-sized hiatal hernia. · ProBNP level of 14,322 (01/08/2019)  · Repeat ProBNP level of 7,167 (01/11/2019)  · Strict I's and O's  · Fluid restriction  · Cardiology followed      Hyperkalemia  · Resolved  · Monitor BMP. Hyperglycemia  · No documented history of diabetes  · Hemoglobin A1c level (01/08/2020): 4.5%. History of Essential hypertension  · Stable  · Continue carvedilol                  Continue management of other chronic medical conditions - see above and orders. Advance Directive: Full Code    DIET GENERAL;  Dietary Nutrition Supplements: Standard High Calorie Oral Supplement     DVT prophylaxis: Apixaban    Consults Made:   IP CONSULT TO ORTHOPEDIC SURGERY  IP CONSULT TO HOSPITALIST  IP CONSULT TO CARDIOLOGY  IP CONSULT TO DIETITIAN  IP CONSULT TO SOCIAL WORK  PHARMACY TO DOSE VANCOMYCIN    Discharge planning: tbd    Time Spent is 25 mins in the examination, evaluation, counseling and review of medications, assessment and plan.      Electronically signed by   Aicha Ramírez MD, MPH,   Internal Medicine Hospitalist   1/12/2020 9:54 AM

## 2020-01-12 NOTE — PROGRESS NOTES
Spoke with Dr. Walter Ayala on patient's family needing more education and also equipment needing ordered so patient can discharge safely and family can feel confident in being able to care for patient. Will have  call [facility] in AM to order w/c for patient and once family decides which home health services will call and get referral. Patient will possibly dc home with home health tomorrow. Family seems to be on board with this.

## 2020-01-12 NOTE — PROGRESS NOTES
SS spoke with patient and family and patient decided to go home with home health. When writer went in to ask what home health agency they would like to use the family appeared confused and said they knew nothing about home health, all they did know is they did not want [facility]. Will call our home health to get further direction spoke with Artie Boswell about options for home health.  In area of [place], will also need to order a w/c for patient at home with elevating leg rest

## 2020-01-12 NOTE — PROGRESS NOTES
Writer and aide went to room to change patient, family was at bedside writer attempted to teach nileyla how to change patient with help of aid and niece barely was able to assist with changing. When writer told family patient is unable to put weight on LLE for the next 6 weeks at least and asked if family would feel comfortable with transfers  they looked overwhelmed with questioning but then they said \" yea we can do it, family has 2 steps to get into house and and are not sure how to do it will get therapy to work with family on transfers. Did recommend ambulance transport when daughter stated \" we can't take her by car? \" I said it would probably not be safe to do such and it would take extensive assistance for them to get her out at home. Family does not currently have a w/c to transport patient order obtained and will find out what provider to get equipment that family chooses. Writer is still strongly encouraging EMS transport for patient but family is still trying to decide. [facility]ing [facility] to try to order w/c with elevating leg rest at this time still awaiting on family to decide home health agency as well.

## 2020-01-13 VITALS
TEMPERATURE: 98.5 F | DIASTOLIC BLOOD PRESSURE: 80 MMHG | WEIGHT: 107.7 LBS | RESPIRATION RATE: 20 BRPM | HEART RATE: 90 BPM | HEIGHT: 60 IN | BODY MASS INDEX: 21.14 KG/M2 | SYSTOLIC BLOOD PRESSURE: 124 MMHG | OXYGEN SATURATION: 90 %

## 2020-01-13 LAB
ANION GAP SERPL CALCULATED.3IONS-SCNC: 12 MMOL/L (ref 7–19)
BASOPHILS ABSOLUTE: 0 K/UL (ref 0–0.2)
BASOPHILS RELATIVE PERCENT: 0.4 % (ref 0–1)
BUN BLDV-MCNC: 23 MG/DL (ref 8–23)
CALCIUM SERPL-MCNC: 8.1 MG/DL (ref 8.2–9.6)
CHLORIDE BLD-SCNC: 105 MMOL/L (ref 98–111)
CO2: 20 MMOL/L (ref 22–29)
CREAT SERPL-MCNC: 0.7 MG/DL (ref 0.5–0.9)
CULTURE, BLOOD 2: NORMAL
EOSINOPHILS ABSOLUTE: 0.3 K/UL (ref 0–0.6)
EOSINOPHILS RELATIVE PERCENT: 2.8 % (ref 0–5)
GFR NON-AFRICAN AMERICAN: >60
GLUCOSE BLD-MCNC: 125 MG/DL (ref 74–109)
HCT VFR BLD CALC: 28.3 % (ref 37–47)
HEMOGLOBIN: 9.2 G/DL (ref 12–16)
IMMATURE GRANULOCYTES #: 0.1 K/UL
LYMPHOCYTES ABSOLUTE: 0.8 K/UL (ref 1.1–4.5)
LYMPHOCYTES RELATIVE PERCENT: 9.3 % (ref 20–40)
MCH RBC QN AUTO: 35 PG (ref 27–31)
MCHC RBC AUTO-ENTMCNC: 32.5 G/DL (ref 33–37)
MCV RBC AUTO: 107.6 FL (ref 81–99)
MONOCYTES ABSOLUTE: 0.8 K/UL (ref 0–0.9)
MONOCYTES RELATIVE PERCENT: 9.3 % (ref 0–10)
NEUTROPHILS ABSOLUTE: 6.9 K/UL (ref 1.5–7.5)
NEUTROPHILS RELATIVE PERCENT: 77.5 % (ref 50–65)
PDW BLD-RTO: 11.3 % (ref 11.5–14.5)
PLATELET # BLD: 138 K/UL (ref 130–400)
PMV BLD AUTO: 12.6 FL (ref 9.4–12.3)
POTASSIUM SERPL-SCNC: 3.7 MMOL/L (ref 3.5–5)
RBC # BLD: 2.63 M/UL (ref 4.2–5.4)
SODIUM BLD-SCNC: 137 MMOL/L (ref 136–145)
WBC # BLD: 8.9 K/UL (ref 4.8–10.8)

## 2020-01-13 PROCEDURE — 36415 COLL VENOUS BLD VENIPUNCTURE: CPT

## 2020-01-13 PROCEDURE — 80048 BASIC METABOLIC PNL TOTAL CA: CPT

## 2020-01-13 PROCEDURE — 2580000003 HC RX 258: Performed by: ORTHOPAEDIC SURGERY

## 2020-01-13 PROCEDURE — 6370000000 HC RX 637 (ALT 250 FOR IP): Performed by: ORTHOPAEDIC SURGERY

## 2020-01-13 PROCEDURE — 85025 COMPLETE CBC W/AUTO DIFF WBC: CPT

## 2020-01-13 RX ADMIN — CARVEDILOL 25 MG: 25 TABLET, FILM COATED ORAL at 17:24

## 2020-01-13 RX ADMIN — APIXABAN 2.5 MG: 2.5 TABLET, FILM COATED ORAL at 08:06

## 2020-01-13 RX ADMIN — CARVEDILOL 25 MG: 25 TABLET, FILM COATED ORAL at 08:06

## 2020-01-13 RX ADMIN — SODIUM CHLORIDE, PRESERVATIVE FREE 10 ML: 5 INJECTION INTRAVENOUS at 08:06

## 2020-01-13 NOTE — DISCHARGE SUMMARY
found for: TRIG, HDL, LDLCALC, LDLDIRECT, LABVLDL  TSH:    Lab Results   Component Value Date    TSH 2.160 12/17/2017     Troponin T: No results for input(s): TROPONINI in the last 72 hours. Pro-BNP: No results for input(s): BNP in the last 72 hours. INR: No results for input(s): INR in the last 72 hours. ABGs:   Lab Results   Component Value Date    PHART 7.420 01/07/2020    PO2ART 45.0 01/07/2020    ZFO5YXT 36.0 01/07/2020     UA:No results for input(s): NITRITE, COLORU, PHUR, LABCAST, WBCUA, RBCUA, MUCUS, TRICHOMONAS, YEAST, BACTERIA, CLARITYU, SPECGRAV, LEUKOCYTESUR, UROBILINOGEN, BILIRUBINUR, BLOODU, GLUCOSEU, AMORPHOUS in the last 72 hours. Invalid input(s): Myra [place]      Culture Results:    No results for input(s): CXSURG in the last 720 hours. Blood Culture Recent:   Recent Labs     01/09/20  1143 01/08/20  1624   BC No Growth to date. Any change in status will be called. Bottle volume = 8 ml  MIXED SKIN DURAN INCLUDING  *  No further workup  Isolated from one set only. Susceptibility testing is not  routinely done as these organism frequently represents skin  contamination in blood cultures. If testing is indicated,  please contact Lab within 7 days. Isolated from Aerobic and Anaerobic bottles    No further workup  Isolated from Aerobic and Anaerobic bottles  Isolated from one set only. Susceptibility testing is not  routinely done as these organism frequently represents skin  contamination in blood cultures. If testing is indicated,  please contact Lab within 7 days. Cultures:   No results for input(s): CULTURE in the last 72 hours. No results for input(s): BC, Cruz Larger in the last 72 hours. No results for input(s): CXSURG in the last 72 hours. No results for input(s): MG, PHOS in the last 72 hours. No results for input(s): AST, ALT, ALB, BILITOT, ALKPHOS, ALB in the last 72 hours.         Significant Diagnostic Studies:   Ct Head Wo Contrast    Result Date: 1/7/2020  CT HEAD WO CONTRAST 1/7/2020 4:00 PM History: Head pain post fall Comparisons: 10/5/2019 head CT Technique: Radiation dose equals  mGy cm. AUTOMATED EXPOSURE CONTROL dose reduction technique was implemented CT evaluation of the head without intravenous contrast. 5 mm transaxial images were obtained. 2-D sagittal and coronal reconstruction images were generated. Findings: There is central and cortical atrophy. There is low attenuation in the periventricular and subcortical white matter, chronic ischemic changes. There is no intra or extra-axial hemorrhage. There is no mass effect or midline shift. There is no hydrocephalus. There is no skull fracture acute calvarial abnormality. Paranasal sinuses imaged incompletely are clear. CT appearance of the head is unchanged. Impression: 1. No CT evidence of an acute intracranial process. Signed by Dr Amador Wick on 1/7/2020 5:29 PM    Xr Chest Portable    Result Date: 1/7/2020  XR CHEST PORTABLE 1/7/2020 3:45 PM HISTORY:   Fall  Single view. COMPARISONS:  8/29/2019, 1/18/2018 and 12/17/2017 FINDINGS: There is mild diffuse perihilar interstitial prominence. The heart is generous. Mild pulmonary venous hypertension considered. There is no parenchymal infiltrates or evidence of overt heart failure. Bony structures are intact without acute osseous abdomen on his. Large hiatal hernia again observed. Generous heart size with diffuse mild perihilar interstitial prominence without parenchymal infiltrates or evidence of overt heart failure.  Signed by Dr Amador Wick on 1/7/2020 5:41 PM    Cta Pulmonary W Contrast    Result Date: 1/8/2020  EXAMINATION: CTA PULMONARY W CONTRAST 1/8/2020 4:25 PM HISTORY: Elevated d-dimer , evaluate for PE Comparison: Chest x-ray 1/7/2020 Technique: Sequential imaging was performed from the thoracic inlet through the upper abdomen after the administration of IV contrast. Sagittal and coronal reformations were made from the original source data and reviewed. Additionally, 3-D volume rendered images of the vessels were made per CTA protocol. Automated exposure control was utilized for radiation dose reduction. Radiation dose: DLP 1107 mGy-cm Findings: There are surgical clips at the base of the neck. There appears to be some thyroid tissue on the left with a hypodense nodule measuring 7 mm in size. The trachea and main bronchi appear widely patent and in normal anatomic position. The esophagus is difficult to evaluate. It appears grossly normal. There is a large hiatal hernia. No pathologically enlarged axillary lymph nodes are identified. Motion somewhat limits evaluation of the mediastinum. No definite mediastinal or hilar lymphadenopathy is identified. The heart is enlarged. There is no appreciable pericardial effusion. Atherosclerotic calcifications are seen within the aorta and its branch vessels. The ascending thoracic aorta measured 3.6 cm in diameter. There is dilation of the main pulmonary artery, suggesting pulmonary arterial hypertension. The pulmonary arteries appear well opacified, and there are no filling defects to suggest PE. Coronary artery calcifications are present. There are small bilateral pleural effusions with associated compressive atelectasis, right greater than left. Diffuse bronchial wall thickening is evident. Multiple tiny subpleural nodules are noted in the left upper lobe. Calcified pleural plaques are evident. There is consolidation in the lung bases bilaterally, left greater than right. Review of the visualized portion of the upper abdomen demonstrates bilateral renal cysts. Review of the visualized osseous structures demonstrates some cortical irregularity in the lower sternum, which may be related to motion. No definite acute osseous abnormality is identified. Impression: 1. No evidence of PE or acute aortic pathology.  2. Consolidation in the lung bases, left greater than right, concerning for pneumonia. 3. Small bilateral pleural effusions, right greater than left. 4. Cardiomegaly. Atherosclerosis of the aorta and coronary arteries. 4. Moderate-sized hiatal hernia. Signed by Dr Cecelia Avila on 1/8/2020 4:34 PM    Xr Hip 2-3 Vw W Pelvis Left    Result Date: 1/8/2020  EXAMINATION:  XR HIP 2-3 VW W PELVIS LEFT  1/8/2020 10:21 PM HISTORY: Femoral neck pinning COMPARISON: No comparison study. TECHNIQUE: Image number: 2 Fluoroscopy time: 36 seconds. Dose: 3.1627 mGy FINDINGS: AP and lateral projection C-arm images from femoral neck pinning identified. 3 pins traversing the subcapital femoral neck fracture. Examination is available for review. Signed by Dr Larissa Silva on 1/8/2020 10:23 PM    Xr Hip 2-3 Vw W Pelvis Left    Result Date: 1/7/2020  EXAMINATION:  XR HIP 2-3 VW W PELVIS LEFT  1/7/2020 5:46 PM HISTORY: Left hip pain post fall COMPARISON: 1/8/2018 pelvis radiograph TECHNIQUE: 3 views: AP pelvis, AP and lateral left hip FINDINGS: There is a subcapital mildly impacted left femur neck fracture. There is diffuse osteoporosis. There are changes from right hip arthroplasty. No additional fractures observed. 1. Subcapital left femur neck fracture. Signed by Dr Larissa Silva on 1/7/2020 5:48 PM    2D Echo: 01/08/2020   Summary   Mild to moderate aortic stenosis is present.   The aortic valve area is 0.8 cm2 with a maximum gradient of 25 mmHg and a   mean gradient of 14 mmHg.   Mild aortic regurgitation is noted.   Severe tricuspid regurgitation with estimated RVSP of 74 mm Hg.   Normal left ventricular size and function.   Moderate concentric left ventricular hypertrophy.  E/A flow reversal noted.  Suggestive of diastolic dysfunction.      Signature   ----------------------------------------------------------------   Electronically signed by Nathan Bearden MD(Interpreting   [id number]) on 01/08/2020 10:22 AM   ----------------------------------------------------------------       Hospital Course:   Ms. Spent on discharge is more than 36 mins in the examination, evaluation, counseling and review of medications and discharge plan. Electronically signed by   Kanwal Tamez MD, MPH,   Internal Medicine Hospitalist   1/13/2020 9:53 AM      Thank you NAOMI Carroll NP for the opportunity to be involved in this patient's care. If you have any questions or concerns please feel free to contact me at [phone]        EMR Dragon/Transcription disclaimer:   Much of this encounter note is an electronic transcription/translation of spoken language to printed text.  The electronic translation of spoken language may permit erroneous, or at times, nonsensical words or phrases to be inadvertently transcribed; although attempts have made to review the note for such errors, some may still exist.

## 2020-01-13 NOTE — CARE COORDINATION
All DME ordered thru [facility] per family requests.   Electronically signed by Amairani Hough RN, BSN on 1/13/2020 at 7:27 AM

## 2020-01-13 NOTE — CARE COORDINATION
Spoke with patient regarding MD orders for [facility] services. Patient agreeable and has chosen [facility]. Referral Faxed. [address] [place] [address] [phone]. [phone]. Please notify [facility] when patient discharges and fax DC Summary,  DC med list and any new [facility] orders. Please contact Pardeep Bowman  at [phone] before any changes in discharge plan occurs. I have spoke with Debra Thornton regarding this patient and have received orders for [facility]. The Plan for Transition of Care is related to the following treatment goals: Wound will heal without complications and patient will regain strength and be safe in her moblility post surgical intervention. The Patient was provided with a choice of provider and agrees with the discharge plan. [x] Yes [] No    Freedom of choice list was provided with basic dialogue that supports the patient's individualized plan of care/goals, treatment preferences and shares the quality data associated with the providers.  [x] Yes [] No  Electronically signed by Bea Astorga RN on 1/13/2020 at 1:04 PM

## 2020-01-14 LAB
BLOOD CULTURE, ROUTINE: NORMAL
CULTURE, BLOOD 2: NORMAL
PROCALCITONIN: 0.61 NG/ML

## 2020-01-31 ENCOUNTER — TELEPHONE (OUTPATIENT)
Dept: CARDIOLOGY | Age: 85
End: 2020-01-31

## 2020-07-18 ENCOUNTER — APPOINTMENT (OUTPATIENT)
Dept: GENERAL RADIOLOGY | Age: 85
End: 2020-07-18
Payer: MEDICARE

## 2020-07-18 ENCOUNTER — HOSPITAL ENCOUNTER (EMERGENCY)
Age: 85
Discharge: HOME | End: 2020-07-18
Payer: MEDICARE

## 2020-07-18 VITALS
RESPIRATION RATE: 18 BRPM | WEIGHT: 110 LBS | BODY MASS INDEX: 18.78 KG/M2 | HEIGHT: 64 IN | OXYGEN SATURATION: 92 % | SYSTOLIC BLOOD PRESSURE: 126 MMHG | TEMPERATURE: 98.2 F | DIASTOLIC BLOOD PRESSURE: 74 MMHG | HEART RATE: 72 BPM

## 2020-07-18 PROCEDURE — 99283 EMERGENCY DEPT VISIT LOW MDM: CPT

## 2020-07-18 PROCEDURE — 99999 PR OFFICE/OUTPT VISIT,PROCEDURE ONLY: CPT | Performed by: NURSE PRACTITIONER

## 2020-07-18 PROCEDURE — 73502 X-RAY EXAM HIP UNI 2-3 VIEWS: CPT

## 2020-07-18 PROCEDURE — 72100 X-RAY EXAM L-S SPINE 2/3 VWS: CPT

## 2020-07-18 RX ORDER — HYDROCODONE BITARTRATE AND ACETAMINOPHEN 5; 325 MG/1; MG/1
1 TABLET ORAL EVERY 6 HOURS PRN
Qty: 10 TABLET | Refills: 0 | Status: SHIPPED | OUTPATIENT
Start: 2020-07-18 | End: 2020-07-21

## 2020-07-18 ASSESSMENT — ENCOUNTER SYMPTOMS
RHINORRHEA: 0
CONSTIPATION: 0
NAUSEA: 0
SINUS PRESSURE: 0
SHORTNESS OF BREATH: 0
VOMITING: 0
DIARRHEA: 0
COUGH: 0
SORE THROAT: 0
TROUBLE SWALLOWING: 0
ABDOMINAL PAIN: 0

## 2020-07-18 ASSESSMENT — PAIN SCALES - GENERAL: PAINLEVEL_OUTOF10: 4

## 2020-07-18 NOTE — ED NOTES
Patient ambulated with front wheel walker approximately 30 feet. Tolerated well. Tino So, NAOMI aware.      Durga Leal RN  07/18/20 3691

## 2020-07-18 NOTE — ED PROVIDER NOTES
patient is not nervous/anxious. A complete review of systems was performed and is negative except as noted above in the HPI. PAST MEDICAL HISTORY     Past Medical History:   Diagnosis Date    Closed right hip fracture, initial encounter ([facility]     Hyperlipidemia     Hypertension          SURGICAL HISTORY       Past Surgical History:   Procedure Laterality Date    HIP PINNING Left 1/8/2020    3 SCREWS/ PERCUTANEOUS HIP PINNING performed by Viral Lenz MD at [place] [address] Right 1/18/2018    HIP HEMIARTHROPLASTY performed by Xi Valdez MD at [address] [place] [address]       Previous Medications    CARVEDILOL (COREG) 25 MG TABLET    Take 25 mg by mouth 2 times daily (with meals)    POTASSIUM CITRATE (UROCIT-K) 10 MEQ (1080 MG) EXTENDED RELEASE TABLET    Take by mouth 2 times daily       ALLERGIES     Patient has no known allergies. FAMILY HISTORY     No family history on file. SOCIAL HISTORY       Social History     Socioeconomic History    Marital status:       Spouse name: Not on file    Number of children: Not on file    Years of education: Not on file    Highest education level: Not on file   Occupational History    Not on file   Social Needs    Financial resource strain: Not on file    Food insecurity     Worry: Not on file     Inability: Not on file    Transportation needs     Medical: Not on file     Non-medical: Not on file   Tobacco Use    Smoking status: Never Smoker    Smokeless tobacco: Never Used   Substance and Sexual Activity    Alcohol use: No    Drug use: No    Sexual activity: Not on file   Lifestyle    Physical activity     Days per week: Not on file     Minutes per session: Not on file    Stress: Not on file   Relationships    Social connections     Talks on phone: Not on file     Gets together: Not on file     Attends Yazdanism service: Not on file     Active member of club or organization: Not on file     Attends meetings of clubs or organizations: Not on file     Relationship status: Not on file    Intimate partner violence     Fear of current or ex partner: Not on file     Emotionally abused: Not on file     Physically abused: Not on file     Forced sexual activity: Not on file   Other Topics Concern    Not on file   Social History Narrative    Not on file       SCREENINGS             PHYSICAL EXAM    (up to 7 for level 4, 8 or more for level 5)     ED Triage Vitals [07/18/20 1606]   BP Temp Temp src Pulse Resp SpO2 Height Weight   126/74 98.2 °F (36.8 °C) -- 72 18 92 % 5' 4\" (1.626 m) 110 lb (49.9 kg)       Physical Exam  Vitals signs reviewed. Constitutional:       Appearance: Normal appearance. HENT:      Head: Normocephalic and atraumatic. Neck:      Musculoskeletal: Normal range of motion and neck supple. Cardiovascular:      Rate and Rhythm: Normal rate and regular rhythm. Pulses: Normal pulses. Heart sounds: Normal heart sounds. Pulmonary:      Effort: Pulmonary effort is normal.      Breath sounds: Normal breath sounds. Musculoskeletal:      Left hip: She exhibits tenderness (mild left hip). She exhibits normal range of motion and normal strength. Comments: Kyphosis noted   Skin:     General: Skin is warm. Neurological:      Mental Status: She is alert. DIAGNOSTIC RESULTS     EKG: All EKG's are interpreted by the Emergency Department Physician who either signs or Co-signs this chart in the absence of a cardiologist.        RADIOLOGY:   Non-plain film images such as CT, Ultrasound and MRI are read by the radiologist. Plainradiographic images are visualized and preliminarily interpreted by the emergency physician with the below findings:        Interpretation per the Radiologist below, if available at the time of this note:    XR HIP 2-3 VW W PELVIS LEFT   Final Result   1. Right hip prosthesis and left hip femoral neck screws.    2. Intact pelvis. Signed by Dr Alisha Barahona on 7/18/2020 5:17 PM      XR LUMBAR SPINE (2-3 VIEWS)   Final Result   1. Scoliosis with multilevel degenerative disc, endplate, and facet   disease. 2. No acute bony abnormality is seen. Signed by Dr Alisha Barahona on 7/18/2020 5:14 PM            ED BEDSIDE ULTRASOUND:   Performed by ED Physician - none    LABS:  Labs Reviewed - No data to display    All other labs were within normal range or not returned as of this dictation. EMERGENCY DEPARTMENT COURSE and DIFFERENTIALDIAGNOSIS/MDM:   Vitals:    Vitals:    07/18/20 1606   BP: 126/74   Pulse: 72   Resp: 18   Temp: 98.2 °F (36.8 °C)   SpO2: 92%   Weight: 110 lb (49.9 kg)   Height: 5' 4\" (1.626 m)       MDM  Number of Diagnoses or Management Options  Left hip pain:   Diagnosis management comments: Going to give a few days of pain medication and have her follow-up with her PCP or orthopedics. She was able to ambulate in the lassiter with some assistance. Amount and/or Complexity of Data Reviewed  Tests in the radiology section of CPT®: ordered and reviewed          CONSULTS:  None    PROCEDURES:  Unless otherwise notedbelow, none     Procedures    FINAL IMPRESSION     1. Left hip pain          DISPOSITION/PLAN   DISPOSITION        PATIENT REFERRED TO:  @FUP@    DISCHARGE MEDICATIONS:  New Prescriptions    HYDROCODONE-ACETAMINOPHEN (NORCO) 5-325 MG PER TABLET    Take 1 tablet by mouth every 6 hours as needed for Pain for up to 3 days. Intended supply: 3 days. Take lowest dose possible to manage pain     Attestation: The Prescription Monitoring Report for this patient was reviewed today. ([id number]) NAOMI Villafana)  Periodic Controlled Substance Monitoring: Possible medication side effects, risk of tolerance/dependence & alternative treatments discussed., No signs of potential drug abuse or diversion identified.  [facility] AND [facility] NAOMI Miranda)    (Please note that portions of this note were completed with a voice recognition program.  Efforts were made to edit the dictations butoccasionally words are mis-transcribed.)    NAOMI Morales (electronically signed)  [facility], APRPARESH  07/18/20 9179

## 2020-10-08 ENCOUNTER — HOSPITAL ENCOUNTER (EMERGENCY)
Age: 85
Discharge: HOME | End: 2020-10-08
Payer: MEDICARE

## 2020-10-08 ENCOUNTER — APPOINTMENT (OUTPATIENT)
Dept: GENERAL RADIOLOGY | Age: 85
End: 2020-10-08
Payer: MEDICARE

## 2020-10-08 VITALS
SYSTOLIC BLOOD PRESSURE: 130 MMHG | RESPIRATION RATE: 16 BRPM | DIASTOLIC BLOOD PRESSURE: 89 MMHG | HEART RATE: 116 BPM | OXYGEN SATURATION: 98 % | TEMPERATURE: 96.2 F

## 2020-10-08 PROCEDURE — 90715 TDAP VACCINE 7 YRS/> IM: CPT | Performed by: NURSE PRACTITIONER

## 2020-10-08 PROCEDURE — 99283 EMERGENCY DEPT VISIT LOW MDM: CPT

## 2020-10-08 PROCEDURE — 99999 PR OFFICE/OUTPT VISIT,PROCEDURE ONLY: CPT | Performed by: NURSE PRACTITIONER

## 2020-10-08 PROCEDURE — 73590 X-RAY EXAM OF LOWER LEG: CPT

## 2020-10-08 PROCEDURE — 6360000002 HC RX W HCPCS: Performed by: NURSE PRACTITIONER

## 2020-10-08 PROCEDURE — 99281 EMR DPT VST MAYX REQ PHY/QHP: CPT

## 2020-10-08 PROCEDURE — 90471 IMMUNIZATION ADMIN: CPT | Performed by: NURSE PRACTITIONER

## 2020-10-08 PROCEDURE — 12002 RPR S/N/AX/GEN/TRNK2.6-7.5CM: CPT

## 2020-10-08 RX ORDER — CEPHALEXIN 250 MG/1
250 CAPSULE ORAL 3 TIMES DAILY
Qty: 21 CAPSULE | Refills: 0 | Status: SHIPPED | OUTPATIENT
Start: 2020-10-08 | End: 2020-10-15

## 2020-10-08 RX ORDER — LIDOCAINE HYDROCHLORIDE 10 MG/ML
5 INJECTION, SOLUTION EPIDURAL; INFILTRATION; INTRACAUDAL; PERINEURAL ONCE
Status: DISCONTINUED | OUTPATIENT
Start: 2020-10-08 | End: 2020-10-08 | Stop reason: HOSPADM

## 2020-10-08 RX ADMIN — TETANUS TOXOID, REDUCED DIPHTHERIA TOXOID AND ACELLULAR PERTUSSIS VACCINE, ADSORBED 0.5 ML: 5; 2.5; 8; 8; 2.5 SUSPENSION INTRAMUSCULAR at 18:27

## 2020-10-08 NOTE — ED PROVIDER NOTES
[facility]  eMERGENCY dEPARTMENT eNCOUnter      Pt Name: Chris Patel  MRN: [id number]  [place] 9/11/1926  Date of evaluation: 10/8/2020  Provider: [address]       Chief Complaint   Patient presents with    Laceration     right shin from dog leash         HISTORY OF PRESENT ILLNESS   (Location/Symptom, Timing/Onset,Context/Setting, Quality, Duration, Modifying Factors, Severity)  Note limiting factors. Neno Fly a 80 y.o. female who presents to the emergency department for evaluation of laceration. Pt presents with her son who tells me that patient lower leg was cut by pt's dog's chain. He describes the dog's chain rapping around pt's right lower leg yesterday while she was on potty chair. He tells me that patient did not fall or sustain other injury. She has had no fevers or recent illness. Pt's son is concerned that the wound to her lower right leg may become infected. He tells me that she needs a tetanus up date. HPI    Nursing Notes were reviewed. REVIEW OF SYSTEMS    (2-9 systems for level 4, 10 or more for level 5)     Review of Systems   Constitutional: Negative. Skin: Positive for wound (right lower leg). A complete review of systems was performed and is negative except as noted above in the HPI.        PAST MEDICAL HISTORY     Past Medical History:   Diagnosis Date    [facility]     Hyperlipidemia     Hypertension          SURGICAL HISTORY       Past Surgical History:   Procedure Laterality Date    HIP PINNING Left 1/8/2020    3 SCREWS/ PERCUTANEOUS HIP PINNING performed by Robbi Banda MD at [place] [address] Right 1/18/2018    HIP HEMIARTHROPLASTY performed by Michael Rivera MD at [address] [place] [address]       Previous Medications    CARVEDILOL (COREG) 25 MG TABLET    Take 25 mg by mouth 2 times daily (with meals)    POTASSIUM CITRATE (UROCIT-K) 10 MEQ (1080 MG) EXTENDED RELEASE TABLET    Take by mouth 2 times daily       ALLERGIES     Patient has no known allergies. FAMILY HISTORY     No family history on file. SOCIAL HISTORY       Social History     Socioeconomic History    Marital status:      Spouse name: Not on file    Number of children: Not on file    Years of education: Not on file    Highest education level: Not on file   Occupational History    Not on file   Social Needs    Financial resource strain: Not on file    Food insecurity     Worry: Not on file     Inability: Not on file    Transportation needs     Medical: Not on file     Non-medical: Not on file   Tobacco Use    Smoking status: Never Smoker    Smokeless tobacco: Never Used   Substance and Sexual Activity    Alcohol use: No    Drug use: No    Sexual activity: Not on file   Lifestyle    Physical activity     Days per week: Not on file     Minutes per session: Not on file    Stress: Not on file   Relationships    Social connections     Talks on phone: Not on file     Gets together: Not on file     Attends Yarsani service: Not on file     Active member of club or organization: Not on file     Attends meetings of clubs or organizations: Not on file     Relationship status: Not on file    Intimate partner violence     Fear of current or ex partner: Not on file     Emotionally abused: Not on file     Physically abused: Not on file     Forced sexual activity: Not on file   Other Topics Concern    Not on file   Social History Narrative    Not on file       SCREENINGS             PHYSICAL EXAM    (up to 7 for level 4, 8 or more for level 5)     ED Triage Vitals [10/08/20 1801]   BP Temp Temp src Pulse Resp SpO2 Height Weight   130/89 96.2 °F (35.7 °C) -- 116 16 98 % -- --       Physical Exam  Vitals signs reviewed. Neck:      Comments: Neck held in flexon  Cardiovascular:      Rate and Rhythm: Normal rate.    Pulmonary:      Effort: Pulmonary effort is normal.   Musculoskeletal:      Comments: CMS intact to right lower leg  [id number] superficial skin flap type wound   Skin:     General: Skin is warm and dry. Neurological:      Mental Status: She is alert. Comments: Pt is alert disoriented to time-pt's son tells me that her mental status is at baseline         DIAGNOSTIC RESULTS     EKG: All EKG's are interpreted by the Emergency Department Physician who either signs or Co-signs this chart in the absence of acardiologist.        RADIOLOGY:   Non-plain film images such as CT, Ultrasound andMRI are read by the radiologist. Plain radiographic images are visualized and preliminarily interpreted by the emergency physician with the below findings:        Interpretation per the Radiologist below, if available at the time of this note:    XR TIBIA FIBULA RIGHT (2 VIEWS)   Final Result   No acute osseous findings. Signed by Dr Bryanna Meyer on 10/8/2020 6:51 PM            ED BEDSIDE ULTRASOUND:   Performed by ED Physician - none    LABS:  Labs Reviewed - No data to display    All other labs were within normal range or not returned as of this dictation. RE-ASSESSMENT           EMERGENCY DEPARTMENT COURSE and DIFFERENTIALDIAGNOSIS/MDM:   Vitals:    Vitals:    10/08/20 1801   BP: 130/89   Pulse: 116   Resp: 16   Temp: 96.2 °F (35.7 °C)   SpO2: 98%       MDM      CONSULTS:  None    PROCEDURES:  Unless otherwise notedbelow, none     Lac Repair    Date/Time: 10/8/2020 6:23 PM  Performed by: NAOMI Arriaga  Authorized by: NAOMI Arriaga     Consent:     Consent obtained:  Verbal    Consent given by:  Patient    Risks discussed:  Infection and pain    Alternatives discussed:  No treatment  Universal protocol:     Patient identity confirmed:  Verbally with patient  Anesthesia (see MAR for exact dosages):      Anesthesia method:  None  Laceration details:     Location:  Leg    Leg location:  R lower leg    Length (cm):  4  Repair type:     Repair type: Simple  Pre-procedure details:     Preparation:  Patient was prepped and draped in usual sterile fashion  Exploration:     Hemostasis achieved with:  Direct pressure    Wound exploration: wound explored through full range of motion      Wound extent: no fascia violation noted and no foreign bodies/material noted      Contaminated: no    Treatment:     Area cleansed with:  Saline    Amount of cleaning:  Standard    Irrigation solution:  Sterile saline    Irrigation method:  Pressure wash and syringe    Visualized foreign bodies/material removed: no    Skin repair:     Repair method:  Steri-Strips    Number of Steri-Strips:  6  Approximation:     Approximation:  Close  Post-procedure details:     Dressing:  Tube gauze and non-adherent dressing    Patient tolerance of procedure: Tolerated well, no immediate complications        FINAL IMPRESSION     1. Injury of right lower extremity, initial encounter    2.  Skin tear of lower leg without complication, right, initial encounter          DISPOSITION/PLAN   DISPOSITION        PATIENT REFERRED TO:  NAOMI Sidhu - MOMO GRIER [facility] Dr Endy Guerrero [address] [place] [address] Drive [address] [phone]    Schedule an appointment as soon as possible for a visit in 3 days  For wound re-check      DISCHARGE MEDICATIONS:       Current Discharge Medication List           Medication List      START taking these medications    cephALEXin 250 MG capsule  Commonly known as:  Keflex  Take 1 capsule by mouth 3 times daily for 7 days        ASK your doctor about these medications    carvedilol 25 MG tablet  Commonly known as:  COREG     potassium citrate 10 MEQ (1080 MG) extended release tablet  Commonly known as:  UROCIT-K           Where to Get Your Medications      You can get these medications from any pharmacy    Bring a paper prescription for each of these medications  · cephALEXin 250 MG capsule         (Pleasenote that portions of this note were completed with a voice recognition program.

## 2020-12-22 ENCOUNTER — HOSPITAL ENCOUNTER (EMERGENCY)
Age: 85
Discharge: HOME | End: 2020-12-22
Attending: EMERGENCY MEDICINE
Payer: MEDICARE

## 2020-12-22 ENCOUNTER — APPOINTMENT (OUTPATIENT)
Dept: GENERAL RADIOLOGY | Age: 85
End: 2020-12-22
Payer: MEDICARE

## 2020-12-22 VITALS
OXYGEN SATURATION: 96 % | HEART RATE: 98 BPM | TEMPERATURE: 98.3 F | RESPIRATION RATE: 18 BRPM | DIASTOLIC BLOOD PRESSURE: 79 MMHG | SYSTOLIC BLOOD PRESSURE: 108 MMHG

## 2020-12-22 LAB
ADENOVIRUS BY PCR: NOT DETECTED
ALBUMIN SERPL-MCNC: 3.4 G/DL (ref 3.5–5.2)
ALP BLD-CCNC: 56 U/L (ref 35–104)
ALT SERPL-CCNC: 7 U/L (ref 5–33)
ANION GAP SERPL CALCULATED.3IONS-SCNC: 10 MMOL/L (ref 7–19)
AST SERPL-CCNC: 16 U/L (ref 5–32)
BASOPHILS ABSOLUTE: 0 K/UL (ref 0–0.2)
BASOPHILS RELATIVE PERCENT: 0.4 % (ref 0–1)
BILIRUB SERPL-MCNC: 0.7 MG/DL (ref 0.2–1.2)
BORDETELLA PARAPERTUSSIS BY PCR: NOT DETECTED
BORDETELLA PERTUSSIS BY PCR: NOT DETECTED
BUN BLDV-MCNC: 12 MG/DL (ref 8–23)
CALCIUM SERPL-MCNC: 8.7 MG/DL (ref 8.2–9.6)
CHLAMYDOPHILIA PNEUMONIAE BY PCR: NOT DETECTED
CHLORIDE BLD-SCNC: 101 MMOL/L (ref 98–111)
CO2: 22 MMOL/L (ref 22–29)
CORONAVIRUS 229E BY PCR: NOT DETECTED
CORONAVIRUS HKU1 BY PCR: NOT DETECTED
CORONAVIRUS NL63 BY PCR: NOT DETECTED
CORONAVIRUS OC43 BY PCR: NOT DETECTED
CREAT SERPL-MCNC: 0.6 MG/DL (ref 0.5–0.9)
EOSINOPHILS ABSOLUTE: 0 K/UL (ref 0–0.6)
EOSINOPHILS RELATIVE PERCENT: 0.1 % (ref 0–5)
GFR AFRICAN AMERICAN: >59
GFR NON-AFRICAN AMERICAN: >60
GLUCOSE BLD-MCNC: 161 MG/DL (ref 74–109)
HCT VFR BLD CALC: 37.5 % (ref 37–47)
HEMOGLOBIN: 11.8 G/DL (ref 12–16)
HUMAN METAPNEUMOVIRUS BY PCR: NOT DETECTED
HUMAN RHINOVIRUS/ENTEROVIRUS BY PCR: NOT DETECTED
IMMATURE GRANULOCYTES #: 0 K/UL
INFLUENZA A BY PCR: NOT DETECTED
INFLUENZA B BY PCR: NOT DETECTED
LYMPHOCYTES ABSOLUTE: 0.6 K/UL (ref 1.1–4.5)
LYMPHOCYTES RELATIVE PERCENT: 6.8 % (ref 20–40)
MCH RBC QN AUTO: 32.2 PG (ref 27–31)
MCHC RBC AUTO-ENTMCNC: 31.5 G/DL (ref 33–37)
MCV RBC AUTO: 102.5 FL (ref 81–99)
MONOCYTES ABSOLUTE: 0.5 K/UL (ref 0–0.9)
MONOCYTES RELATIVE PERCENT: 5.4 % (ref 0–10)
MYCOPLASMA PNEUMONIAE BY PCR: NOT DETECTED
NEUTROPHILS ABSOLUTE: 7.4 K/UL (ref 1.5–7.5)
NEUTROPHILS RELATIVE PERCENT: 86.8 % (ref 50–65)
PARAINFLUENZA VIRUS 1 BY PCR: NOT DETECTED
PARAINFLUENZA VIRUS 2 BY PCR: NOT DETECTED
PARAINFLUENZA VIRUS 3 BY PCR: NOT DETECTED
PARAINFLUENZA VIRUS 4 BY PCR: NOT DETECTED
PDW BLD-RTO: 13.1 % (ref 11.5–14.5)
PLATELET # BLD: 156 K/UL (ref 130–400)
PMV BLD AUTO: 11.3 FL (ref 9.4–12.3)
POTASSIUM SERPL-SCNC: 4.1 MMOL/L (ref 3.5–5)
RBC # BLD: 3.66 M/UL (ref 4.2–5.4)
RESPIRATORY SYNCYTIAL VIRUS BY PCR: NOT DETECTED
SARS-COV-2, PCR: DETECTED
SODIUM BLD-SCNC: 133 MMOL/L (ref 136–145)
TOTAL PROTEIN: 6.5 G/DL (ref 6.6–8.7)
WBC # BLD: 8.5 K/UL (ref 4.8–10.8)

## 2020-12-22 PROCEDURE — 99284 EMERGENCY DEPT VISIT MOD MDM: CPT

## 2020-12-22 PROCEDURE — 99999 PR OFFICE/OUTPT VISIT,PROCEDURE ONLY: CPT | Performed by: EMERGENCY MEDICINE

## 2020-12-22 PROCEDURE — 73590 X-RAY EXAM OF LOWER LEG: CPT

## 2020-12-22 PROCEDURE — 85025 COMPLETE CBC W/AUTO DIFF WBC: CPT

## 2020-12-22 PROCEDURE — 71045 X-RAY EXAM CHEST 1 VIEW: CPT

## 2020-12-22 PROCEDURE — 73502 X-RAY EXAM HIP UNI 2-3 VIEWS: CPT

## 2020-12-22 PROCEDURE — 0202U NFCT DS 22 TRGT SARS-COV-2: CPT

## 2020-12-22 PROCEDURE — 80053 COMPREHEN METABOLIC PANEL: CPT

## 2020-12-22 PROCEDURE — 36415 COLL VENOUS BLD VENIPUNCTURE: CPT

## 2020-12-22 ASSESSMENT — ENCOUNTER SYMPTOMS
SHORTNESS OF BREATH: 0
ABDOMINAL PAIN: 0
DIARRHEA: 0
COUGH: 1
VOMITING: 0
WHEEZING: 0
BACK PAIN: 0

## 2020-12-23 ENCOUNTER — CARE COORDINATION (OUTPATIENT)
Dept: CARE COORDINATION | Age: 85
End: 2020-12-23

## 2020-12-23 NOTE — CARE COORDINATION
Patient contacted regarding [id number] diagnosis\". Discussed COVID-19 related testing which was available at this time. Test results were positive. Patient informed of results, if available? Yes    Care Transition Nurse/ Ambulatory Care Manager contacted the family by telephone to perform post discharge assessment. Call within 2 business days of discharge: Yes. Verified name and  with family as identifiers. Provided introduction to self, and explanation of the CTN/ACM role, and reason for call due to risk factors for infection and/or exposure to COVID-19. Symptoms reviewed with family who verbalized the following symptoms: cough, no new symptoms and no worsening symptoms. Due to no new or worsening symptoms encounter was not routed to provider for escalation. Non-face-to-face services provided:  Obtained and reviewed discharge summary and/or continuity of care documents  Education of patient/family/caregiver/guardian to support self-management-monitoring O2 saturation, drinking plenty of fluids, and getting plenty of rest.  Reviewed with son red flags related to COVID-19 and to return to the ER if patient develops chest pain, high fever, O2 saturation below 90%, increasing shortness of breath, chest pain, blue lips, confusion, difficulty staying awake, or any other severe symptoms. Advance Care Planning:   Does patient have an Advance Directive:  not on file. Patient has following risk factors of: no known risk factors. CTN/ACM reviewed discharge instructions, medical action plan and red flags such as increased shortness of breath, increasing fever and signs of decompensation with family who verbalized understanding. Discussed exposure protocols and quarantine with CDC Guidelines What to do if you are sick with coronavirus disease .  Family was given an opportunity for questions and concerns.  The family agrees to contact the [facility] exposure line [phone], [facility] [address] ([phone]) and PCP office for questions related to their healthcare. CTN/ACM provided contact information for future needs. Reviewed and educated family on any new and changed medications related to discharge diagnosis     Patient/family/caregiver given information for [facility] and agrees to enroll son declined at this time. Son agrees to call me back if he changes his mind. Plan for follow-up call in 5-7 days based on severity of symptoms and risk factors. CTN spoke with patient's son who is POA and caring for his mother. Son states that patient slept better last night. Son states that patient has a productive cough, and denies  fever, nausea, vomiting, diarrhea, or shortness of breath. Patient's temperature this morning is 98.3 and O2 saturation is 94%. Patient agrees to call PCP if experiencing new or worsening symptoms or will call 911 for severe or life threatening symptoms. Son agrees that patient will self quarantine and will follow COVID-19 precautions.     Griffin Lopez RN  Care Transitions Nurse

## 2020-12-23 NOTE — ED PROVIDER NOTES
[facility] EMERGENCY DEPT  eMERGENCY dEPARTMENT eNCOUnter      Pt Name: Patsy Faye  MRN: [id number]  [place] 9/11/1926  Date of evaluation: 12/22/2020  Provider: Gale Liu MD    CHIEF COMPLAINT       Chief Complaint   Patient presents with    Nasal Congestion     brought by son POA         HISTORY OF PRESENT ILLNESS   (Location/Symptom, Timing/Onset,Context/Setting, Quality, Duration, Modifying Factors, Severity)  Note limiting factors. Patsy Faye is a 80 y.o. female who presents to the emergency department for evaluation of injury sustained from a fall that occurred about 1 week prior. Patient reports that she stumbled and landed on her right leg. She also complains of some pain in her left hip area. She did not strike her head or sustain loss of consciousness. In discussion with her son who is her power of  he states that she has had about a 2-day history of nasal congestion and a mild cough. Patient states that she has not felt short of breath. She denies vomiting or diarrhea episodes. She resides at home and ambulates with the assistance of a walker. Patient son is concerned that she could possibly have Covid. She has not had any sick contacts with anyone who is tested positive. HPI    NursingNotes were reviewed. REVIEW OF SYSTEMS    (2-9 systems for level 4, 10 or more for level 5)     Review of Systems   Constitutional: Negative for fever. HENT: Positive for congestion. Respiratory: Positive for cough. Negative for shortness of breath and wheezing. Cardiovascular: Negative for chest pain. Gastrointestinal: Negative for abdominal pain, diarrhea and vomiting. Musculoskeletal: Positive for gait problem. Negative for back pain. All other systems reviewed and are negative.            PAST MEDICALHISTORY     Past Medical History:   Diagnosis Date    Closed right hip fracture, initial encounter ([facility] [address]     Hyperlipidemia     Hypertension          SURGICAL HISTORY Past Surgical History:   Procedure Laterality Date    HIP PINNING Left 1/8/2020    3 SCREWS/ PERCUTANEOUS HIP PINNING performed by Fred Tyson MD at [address] Right 1/18/2018    HIP HEMIARTHROPLASTY performed by Hipolito Acuña MD at [address] [place] [address]     Previous Medications    CARVEDILOL (COREG) 25 MG TABLET    Take 25 mg by mouth 2 times daily (with meals)    POTASSIUM CITRATE (UROCIT-K) 10 MEQ (1080 MG) EXTENDED RELEASE TABLET    Take by mouth 2 times daily       ALLERGIES     Patient has no known allergies. FAMILY HISTORY     History reviewed. No pertinent family history. SOCIAL HISTORY       Social History     Socioeconomic History    Marital status:       Spouse name: None    Number of children: None    Years of education: None    Highest education level: None   Occupational History    None   Social Needs    Financial resource strain: None    Food insecurity     Worry: None     Inability: None    Transportation needs     Medical: None     Non-medical: None   Tobacco Use    Smoking status: Never Smoker    Smokeless tobacco: Never Used   Substance and Sexual Activity    Alcohol use: No    Drug use: No    Sexual activity: None   Lifestyle    Physical activity     Days per week: None     Minutes per session: None    Stress: None   Relationships    Social connections     Talks on phone: None     Gets together: None     Attends Congregation service: None     Active member of club or organization: None     Attends meetings of clubs or organizations: None     Relationship status: None    Intimate partner violence     Fear of current or ex partner: None     Emotionally abused: None     Physically abused: None     Forced sexual activity: None   Other Topics Concern    None   Social History Narrative    None       SCREENINGS    Emeka Coma Scale  Eye Opening: Spontaneous  Best Verbal Response: Oriented  Best Motor Response: Obeys commands  Emeka Coma Scale Score: 15        PHYSICAL EXAM    (up to 7 for level 4, 8 or more for level 5)     ED Triage Vitals [12/22/20 1823]   BP Temp Temp src Pulse Resp SpO2 Height Weight   (!) 137/100 98.3 °F (36.8 °C) -- 99 18 96 % -- --       Physical Exam  Vitals signs and nursing note reviewed. HENT:      Head: Atraumatic. Mouth/Throat:      Mouth: Mucous membranes are not dry. Pharynx: No posterior oropharyngeal erythema. Eyes:      General: No scleral icterus. Pupils: Pupils are equal, round, and reactive to light. Neck:      Trachea: No tracheal deviation. Comments: Patient's head is rotated towards her right shoulder. She appears to have significant contracture involving the lateral neck area. Cardiovascular:      Rate and Rhythm: Normal rate and regular rhythm. Heart sounds: Normal heart sounds. No murmur. Pulmonary:      Effort: Pulmonary effort is normal. No respiratory distress. Breath sounds: Normal breath sounds. No stridor. Abdominal:      General: There is no distension. Palpations: Abdomen is soft. Tenderness: There is no abdominal tenderness. There is no guarding. Musculoskeletal:      Right hip: She exhibits normal range of motion and no tenderness. Left hip: She exhibits tenderness. She exhibits normal range of motion and no deformity. Right knee: No tenderness found. Left knee: No tenderness found. Legs:       Comments: Ecchymosis as noted over bilateral lower extremities. Skin:     Capillary Refill: Capillary refill takes less than 2 seconds. Coloration: Skin is not pale. Findings: No rash. Neurological:      Mental Status: She is alert and oriented to person, place, and time. Psychiatric:         Behavior: Behavior is cooperative.          DIAGNOSTIC RESULTS       RADIOLOGY:  Non-plain film images such as CT, Ultrasound and MRI are read by the radiologist. Plain radiographic images are visualized and preliminarily interpreted bythe emergency physician with the below findings:        XR HIP 2-3 VW W PELVIS LEFT    (Results Pending)   XR TIBIA FIBULA RIGHT (2 VIEWS)    (Results Pending)   XR TIBIA FIBULA LEFT (2 VIEWS)    (Results Pending)   XR CHEST PORTABLE    (Results Pending)     CXR: No infiltrate  Pelvis: No fx  Right Tib/Fib: No fx  Left Tib/Fib: No fx      LABS:  Labs Reviewed   RESPIRATORY PANEL, MOLECULAR, WITH COVID-19 - Abnormal; Notable for the following components:       Result Value    SARS-CoV-2, PCR DETECTED (*)     All other components within normal limits    Narrative:     CALL  [facility]  [facility] tel. ,  Results called to Ni Damico RN ER, 12/22/2020 20:00, by [facility]  Results called to Ni Damico RN ER, 12/22/2020 20:00, by Iam De León - Abnormal; Notable for the following components:    Sodium 133 (*)     Glucose 161 (*)     Total Protein 6.5 (*)     Alb 3.4 (*)     All other components within normal limits   CBC WITH AUTO DIFFERENTIAL - Abnormal; Notable for the following components:    RBC 3.66 (*)     Hemoglobin 11.8 (*)     .5 (*)     MCH 32.2 (*)     MCHC 31.5 (*)     Neutrophils % 86.8 (*)     Lymphocytes % 6.8 (*)     Lymphocytes Absolute 0.6 (*)     All other components within normal limits       All other labs were within normal range or not returned as of this dictation. EMERGENCY DEPARTMENT COURSE and DIFFERENTIAL DIAGNOSIS/MDM:   Vitals:    Vitals:    12/22/20 1823 12/22/20 1839   BP: (!) 137/100 110/85   Pulse: 99    Resp: 18    Temp: 98.3 °F (36.8 °C)    SpO2: 96%        MDM    Reassessment    Patient's radiograph negative for any evidence of acute fracture. Chest radiograph does not reveal overt pneumonia. She has been able to maintain oxygen saturation at 95% on room air. She is alert and conversational.  I discussed with her her diagnosis of COVID-19 infection.   Have encouraged her to self isolate at home, drink plenty of fluids and take Tylenol for body aches. Patient verbalized understanding and all questions were answered. PROCEDURES:  Unless otherwise noted below, none     Procedures    FINAL IMPRESSION      1. COVID-19 virus infection    2.  Contusion of lower extremity, unspecified laterality, initial encounter          DISPOSITION/PLAN   DISPOSITION discharged      PATIENT REFERRED TO:  NAOMI August NP [facility]   [address] [phone]            (Please note that portions of this note were completed with a voice recognition program.  Efforts were made to edit thedictations but occasionally words are mis-transcribed.)    Katie Reyes MD (electronically signed)  Attending Emergency Physician          Katie Reyse MD  12/22/20 2042

## 2020-12-23 NOTE — ED NOTES
Attempted to contact Pt son on his cell phone, no answer at this time     Dipti Weber, RN  12/22/20 [place]

## 2021-01-11 ENCOUNTER — CARE COORDINATION (OUTPATIENT)
Dept: CARE COORDINATION | Age: 86
End: 2021-01-11

## 2021-01-11 NOTE — CARE COORDINATION
You Patient resolved from the Care Transitions episode on 1/11/21    Patient/family has been provided the following resources and education related to COVID-19:                         Signs, symptoms and red flags related to COVID-19            CDC exposure and quarantine guidelines            [facility] exposure contact - [phone]            Contact for their local Department of Health                 Patient currently reports that the following symptoms have improved:  no new/worsening symptoms     No further outreach scheduled with this CTN/ACM. Episode of Care resolved. Patient has this CTN/ACM contact information if future needs arise. CTN spoke with patient's son. Son states that patient is feeling better and her symptoms have resolved. Son also is recovering from COVID-19. Son agrees to call PCP if patient experiences new or worsening symptoms or will call 911 for severe or life threatening symptoms. Son agrees to follow COVID-19 precautions.

## 2021-01-20 ENCOUNTER — APPOINTMENT (OUTPATIENT)
Dept: GENERAL RADIOLOGY | Age: 86
DRG: 871 | End: 2021-01-20
Payer: MEDICARE

## 2021-01-20 ENCOUNTER — APPOINTMENT (OUTPATIENT)
Dept: CT IMAGING | Age: 86
DRG: 871 | End: 2021-01-20
Payer: MEDICARE

## 2021-01-20 ENCOUNTER — HOSPITAL ENCOUNTER (INPATIENT)
Age: 86
LOS: 4 days | Discharge: HOME | DRG: 871 | End: 2021-01-24
Attending: EMERGENCY MEDICINE | Admitting: FAMILY MEDICINE
Payer: MEDICARE

## 2021-01-20 DIAGNOSIS — I48.91 ATRIAL FIBRILLATION, UNSPECIFIED TYPE (HCC): ICD-10-CM

## 2021-01-20 DIAGNOSIS — R41.82 ALTERED MENTAL STATUS, UNSPECIFIED ALTERED MENTAL STATUS TYPE: Primary | ICD-10-CM

## 2021-01-20 DIAGNOSIS — R93.89 ABNORMAL CT SCAN: ICD-10-CM

## 2021-01-20 DIAGNOSIS — R93.89 ABNORMAL X-RAY: ICD-10-CM

## 2021-01-20 DIAGNOSIS — A41.9 SEPSIS, DUE TO UNSPECIFIED ORGANISM, UNSPECIFIED WHETHER ACUTE ORGAN DYSFUNCTION PRESENT (HCC): ICD-10-CM

## 2021-01-20 DIAGNOSIS — N39.0 URINARY TRACT INFECTION WITHOUT HEMATURIA, SITE UNSPECIFIED: ICD-10-CM

## 2021-01-20 LAB
ALBUMIN SERPL-MCNC: 3.1 G/DL (ref 3.5–5.2)
ALP BLD-CCNC: 84 U/L (ref 35–104)
ALT SERPL-CCNC: 8 U/L (ref 5–33)
ANION GAP SERPL CALCULATED.3IONS-SCNC: 14 MMOL/L (ref 7–19)
AST SERPL-CCNC: 17 U/L (ref 5–32)
BACTERIA: ABNORMAL /HPF
BASOPHILS ABSOLUTE: 0.1 K/UL (ref 0–0.2)
BASOPHILS RELATIVE PERCENT: 0.3 % (ref 0–1)
BILIRUB SERPL-MCNC: 1.1 MG/DL (ref 0.2–1.2)
BILIRUBIN URINE: ABNORMAL
BLOOD, URINE: ABNORMAL
BUN BLDV-MCNC: 26 MG/DL (ref 8–23)
CALCIUM SERPL-MCNC: 8.9 MG/DL (ref 8.2–9.6)
CHLORIDE BLD-SCNC: 106 MMOL/L (ref 98–111)
CLARITY: ABNORMAL
CO2: 20 MMOL/L (ref 22–29)
COLOR: ABNORMAL
CREAT SERPL-MCNC: 0.6 MG/DL (ref 0.5–0.9)
EKG P AXIS: NORMAL DEGREES
EKG P-R INTERVAL: NORMAL MS
EKG Q-T INTERVAL: 374 MS
EKG QRS DURATION: 88 MS
EKG QTC CALCULATION (BAZETT): 445 MS
EKG T AXIS: -121 DEGREES
EOSINOPHILS ABSOLUTE: 0.1 K/UL (ref 0–0.6)
EOSINOPHILS RELATIVE PERCENT: 0.7 % (ref 0–5)
EPITHELIAL CELLS, UA: ABNORMAL /HPF
GFR AFRICAN AMERICAN: >59
GFR NON-AFRICAN AMERICAN: >60
GLUCOSE BLD-MCNC: 220 MG/DL (ref 74–109)
GLUCOSE URINE: NEGATIVE MG/DL
HCT VFR BLD CALC: 38.3 % (ref 37–47)
HEMOGLOBIN: 11.9 G/DL (ref 12–16)
HYALINE CASTS: ABNORMAL /LPF (ref 0–5)
IMMATURE GRANULOCYTES #: 0.2 K/UL
KETONES, URINE: NEGATIVE MG/DL
LACTIC ACID, SEPSIS: 2.9 MG/DL (ref 0.5–1.9)
LACTIC ACID: 4.7 MMOL/L (ref 0.5–1.9)
LEUKOCYTE ESTERASE, URINE: ABNORMAL
LIPASE: 52 U/L (ref 13–60)
LYMPHOCYTES ABSOLUTE: 0.7 K/UL (ref 1.1–4.5)
LYMPHOCYTES RELATIVE PERCENT: 3.8 % (ref 20–40)
MCH RBC QN AUTO: 31.8 PG (ref 27–31)
MCHC RBC AUTO-ENTMCNC: 31.1 G/DL (ref 33–37)
MCV RBC AUTO: 102.4 FL (ref 81–99)
MONOCYTES ABSOLUTE: 0.8 K/UL (ref 0–0.9)
MONOCYTES RELATIVE PERCENT: 4.4 % (ref 0–10)
NEUTROPHILS ABSOLUTE: 15.5 K/UL (ref 1.5–7.5)
NEUTROPHILS RELATIVE PERCENT: 89.8 % (ref 50–65)
NITRITE, URINE: POSITIVE
PDW BLD-RTO: 14.9 % (ref 11.5–14.5)
PH UA: 5 (ref 5–8)
PLATELET # BLD: 253 K/UL (ref 130–400)
PMV BLD AUTO: 13 FL (ref 9.4–12.3)
POTASSIUM REFLEX MAGNESIUM: 4.6 MMOL/L (ref 3.5–5)
PROCALCITONIN: 0.33 NG/ML (ref 0–0.09)
PROTEIN UA: 30 MG/DL
RBC # BLD: 3.74 M/UL (ref 4.2–5.4)
RBC UA: ABNORMAL /HPF (ref 0–2)
SARS-COV-2, NAAT: NOT DETECTED
SODIUM BLD-SCNC: 140 MMOL/L (ref 136–145)
SPECIFIC GRAVITY UA: 1.03 (ref 1–1.03)
TOTAL PROTEIN: 6.9 G/DL (ref 6.6–8.7)
TROPONIN: 0.02 NG/ML (ref 0–0.03)
UROBILINOGEN, URINE: 1 E.U./DL
WBC # BLD: 17.3 K/UL (ref 4.8–10.8)
WBC UA: ABNORMAL /HPF (ref 0–5)

## 2021-01-20 PROCEDURE — U0002 COVID-19 LAB TEST NON-CDC: HCPCS

## 2021-01-20 PROCEDURE — 87040 BLOOD CULTURE FOR BACTERIA: CPT

## 2021-01-20 PROCEDURE — 85025 COMPLETE CBC W/AUTO DIFF WBC: CPT

## 2021-01-20 PROCEDURE — 36415 COLL VENOUS BLD VENIPUNCTURE: CPT

## 2021-01-20 PROCEDURE — 2580000003 HC RX 258: Performed by: FAMILY MEDICINE

## 2021-01-20 PROCEDURE — 2580000003 HC RX 258: Performed by: EMERGENCY MEDICINE

## 2021-01-20 PROCEDURE — 83605 ASSAY OF LACTIC ACID: CPT

## 2021-01-20 PROCEDURE — 87086 URINE CULTURE/COLONY COUNT: CPT

## 2021-01-20 PROCEDURE — 73502 X-RAY EXAM HIP UNI 2-3 VIEWS: CPT

## 2021-01-20 PROCEDURE — 84145 PROCALCITONIN (PCT): CPT

## 2021-01-20 PROCEDURE — 99284 EMERGENCY DEPT VISIT MOD MDM: CPT

## 2021-01-20 PROCEDURE — 71275 CT ANGIOGRAPHY CHEST: CPT

## 2021-01-20 PROCEDURE — 81001 URINALYSIS AUTO W/SCOPE: CPT

## 2021-01-20 PROCEDURE — 70450 CT HEAD/BRAIN W/O DYE: CPT

## 2021-01-20 PROCEDURE — 87186 SC STD MICRODIL/AGAR DIL: CPT

## 2021-01-20 PROCEDURE — 1210000000 HC MED SURG R&B

## 2021-01-20 PROCEDURE — 96374 THER/PROPH/DIAG INJ IV PUSH: CPT

## 2021-01-20 PROCEDURE — 74177 CT ABD & PELVIS W/CONTRAST: CPT

## 2021-01-20 PROCEDURE — 6360000002 HC RX W HCPCS: Performed by: EMERGENCY MEDICINE

## 2021-01-20 PROCEDURE — 84484 ASSAY OF TROPONIN QUANT: CPT

## 2021-01-20 PROCEDURE — 6370000000 HC RX 637 (ALT 250 FOR IP): Performed by: FAMILY MEDICINE

## 2021-01-20 PROCEDURE — 83690 ASSAY OF LIPASE: CPT

## 2021-01-20 PROCEDURE — 72125 CT NECK SPINE W/O DYE: CPT

## 2021-01-20 PROCEDURE — 6360000004 HC RX CONTRAST MEDICATION: Performed by: EMERGENCY MEDICINE

## 2021-01-20 PROCEDURE — 93005 ELECTROCARDIOGRAM TRACING: CPT | Performed by: EMERGENCY MEDICINE

## 2021-01-20 PROCEDURE — 80053 COMPREHEN METABOLIC PANEL: CPT

## 2021-01-20 RX ORDER — SODIUM CHLORIDE 0.9 % (FLUSH) 0.9 %
10 SYRINGE (ML) INJECTION PRN
Status: DISCONTINUED | OUTPATIENT
Start: 2021-01-20 | End: 2021-01-24 | Stop reason: HOSPADM

## 2021-01-20 RX ORDER — SODIUM CHLORIDE 9 MG/ML
INJECTION, SOLUTION INTRAVENOUS CONTINUOUS
Status: DISCONTINUED | OUTPATIENT
Start: 2021-01-20 | End: 2021-01-22

## 2021-01-20 RX ORDER — SODIUM CHLORIDE 9 MG/ML
INJECTION, SOLUTION INTRAVENOUS CONTINUOUS
Status: DISCONTINUED | OUTPATIENT
Start: 2021-01-20 | End: 2021-01-23

## 2021-01-20 RX ORDER — 0.9 % SODIUM CHLORIDE 0.9 %
30 INTRAVENOUS SOLUTION INTRAVENOUS ONCE
Status: COMPLETED | OUTPATIENT
Start: 2021-01-20 | End: 2021-01-20

## 2021-01-20 RX ORDER — SODIUM CHLORIDE 0.9 % (FLUSH) 0.9 %
10 SYRINGE (ML) INJECTION EVERY 12 HOURS SCHEDULED
Status: DISCONTINUED | OUTPATIENT
Start: 2021-01-20 | End: 2021-01-24 | Stop reason: HOSPADM

## 2021-01-20 RX ORDER — OYSTER SHELL CALCIUM WITH VITAMIN D 500; 200 MG/1; [IU]/1
1 TABLET, FILM COATED ORAL DAILY
Status: DISCONTINUED | OUTPATIENT
Start: 2021-01-20 | End: 2021-01-24 | Stop reason: HOSPADM

## 2021-01-20 RX ORDER — CARVEDILOL 12.5 MG/1
25 TABLET ORAL 2 TIMES DAILY WITH MEALS
Status: DISCONTINUED | OUTPATIENT
Start: 2021-01-20 | End: 2021-01-24 | Stop reason: HOSPADM

## 2021-01-20 RX ADMIN — SODIUM CHLORIDE 75 ML/HR: 9 INJECTION, SOLUTION INTRAVENOUS at 17:45

## 2021-01-20 RX ADMIN — SODIUM CHLORIDE 1000 ML: 9 INJECTION, SOLUTION INTRAVENOUS at 16:20

## 2021-01-20 RX ADMIN — Medication 500 MG: at 19:21

## 2021-01-20 RX ADMIN — SODIUM CHLORIDE: 9 INJECTION, SOLUTION INTRAVENOUS at 20:59

## 2021-01-20 RX ADMIN — IOPAMIDOL 90 ML: 755 INJECTION, SOLUTION INTRAVENOUS at 16:18

## 2021-01-20 RX ADMIN — ENOXAPARIN SODIUM 50 MG: 60 INJECTION SUBCUTANEOUS at 19:29

## 2021-01-20 RX ADMIN — OYSTER SHELL CALCIUM WITH VITAMIN D 1 TABLET: 500; 200 TABLET, FILM COATED ORAL at 23:21

## 2021-01-20 RX ADMIN — CEFTRIAXONE 1000 MG: 1 INJECTION, POWDER, FOR SOLUTION INTRAMUSCULAR; INTRAVENOUS at 16:20

## 2021-01-20 RX ADMIN — Medication 10 ML: at 20:59

## 2021-01-20 NOTE — ED NOTES
Pt in gown and hooked up to cardiac monitor. Pt is ill appearing at this time.  Spoke with son Radha Johnson, he states that patient is not eating and drinking much       Nestora ALLEN Mcdonnell  [address] [id number]

## 2021-01-20 NOTE — H&P
HISTORY AND PHYSICAL             Date: 1/20/2021        Patient Name: Lady Rausch     YOB: 1926      Age:  80 y.o. Chief Complaint     Chief Complaint   Patient presents with    Shortness of Breath     Hx of COVID in december, worse over the last week        History Obtained From   patient, electronic medical record    History of Present Illness   Most of the HPI is obtained from emergency room physician, as patient is at baseline dementia state, very fragile/sick. Patient seen in ER bay 16 51-year-old  female with a known past medical history of hypertension, hyperlipidemia, diagnosed with COVID-19 back in December 2020, reports for patient's son Cristobal Lackey, phone number [phone], patient has been home alone and has continued to deteriorate since his diagnosis. Of note patient was last hospitalized in January 2020, at which time she underwent operative repair of the left hip undergoing percutaneous screw fixation. Work-up in the emergency room currently reveals patient to meet sepsis criteria with elevated WBC count, tachycardia as well as urinary tract infection. CTA chest with evidence of groundglass opacities in the peripheries bilaterally. Patient stating that she would like to go home repetitively, currently denying any pain when asked directly. Evidence of tachycardia sinus in nature on telemetry monitor. Documentation by nurse who has been in conversation with patient's son of possible transition to comfort/hospice measures. Patient will be admitted to medical floors Covid testing is negative, ongoing antibiotic therapy, follow-up on urine culture/blood culture. Palliative consultation to be placed.     Past Medical History     Past Medical History:   Diagnosis Date    Closed right hip fracture, initial encounter ([facility] [address]     Hyperlipidemia     Hypertension         Past Surgical History     Past Surgical History:   Procedure Laterality Date    HIP PINNING Left Comments: Underlying dementia evident         Labs      Recent Results (from the past 24 hour(s))   CBC Auto Differential    Collection Time: 01/20/21  2:13 PM   Result Value Ref Range    WBC 17.3 (H) 4.8 - 10.8 K/uL    RBC 3.74 (L) 4.20 - 5.40 M/uL    Hemoglobin 11.9 (L) 12.0 - 16.0 g/dL    Hematocrit 38.3 37.0 - 47.0 %    .4 (H) 81.0 - 99.0 fL    MCH 31.8 (H) 27.0 - 31.0 pg    MCHC 31.1 (L) 33.0 - 37.0 g/dL    RDW 14.9 (H) 11.5 - 14.5 %    Platelets 448 472 - 802 K/uL    MPV 13.0 (H) 9.4 - 12.3 fL    Neutrophils % 89.8 (H) 50.0 - 65.0 %    Lymphocytes % 3.8 (L) 20.0 - 40.0 %    Monocytes % 4.4 0.0 - 10.0 %    Eosinophils % 0.7 0.0 - 5.0 %    Basophils % 0.3 0.0 - 1.0 %    Neutrophils Absolute 15.5 (H) 1.5 - 7.5 K/uL    Immature Granulocytes # 0.2 K/uL    Lymphocytes Absolute 0.7 (L) 1.1 - 4.5 K/uL    Monocytes Absolute 0.80 0.00 - 0.90 K/uL    Eosinophils Absolute 0.10 0.00 - 0.60 K/uL    Basophils Absolute 0.10 0.00 - 0.20 K/uL   Comprehensive Metabolic Panel w/ Reflex to MG    Collection Time: 01/20/21  2:13 PM   Result Value Ref Range    Sodium 140 136 - 145 mmol/L    Potassium reflex Magnesium 4.6 3.5 - 5.0 mmol/L    Chloride 106 98 - 111 mmol/L    CO2 20 (L) 22 - 29 mmol/L    Anion Gap 14 7 - 19 mmol/L    Glucose 220 (H) 74 - 109 mg/dL    BUN 26 (H) 8 - 23 mg/dL    CREATININE 0.6 0.5 - 0.9 mg/dL    GFR Non-African American >60 >60    GFR African American >59 >59    Calcium 8.9 8.2 - 9.6 mg/dL    Total Protein 6.9 6.6 - 8.7 g/dL    Alb 3.1 (L) 3.5 - 5.2 g/dL    Total Bilirubin 1.1 0.2 - 1.2 mg/dL    Alkaline Phosphatase 84 35 - 104 U/L    ALT 8 5 - 33 U/L    AST 17 5 - 32 U/L   Lipase    Collection Time: 01/20/21  2:13 PM   Result Value Ref Range    Lipase 52 13 - 60 U/L   Troponin    Collection Time: 01/20/21  2:13 PM   Result Value Ref Range    Troponin 0.02 0.00 - 0.03 ng/mL   Urinalysis Reflex to Culture    Collection Time: 01/20/21  2:53 PM    Specimen: Urine, clean catch   Result Value Ref Range    Color, UA DARK YELLOW (A) Straw/Yellow    Clarity, UA TURBID (A) Clear    Glucose, Ur Negative Negative mg/dL    Bilirubin Urine MODERATE (A) Negative    Ketones, Urine Negative Negative mg/dL    Specific Gravity, UA 1.027 1.005 - 1.030    Blood, Urine LARGE (A) Negative    pH, UA 5.0 5.0 - 8.0    Protein, UA 30 (A) Negative mg/dL    Urobilinogen, Urine 1.0 <2.0 E.U./dL    Nitrite, Urine POSITIVE (A) Negative    Leukocyte Esterase, Urine MODERATE (A) Negative   Microscopic Urinalysis    Collection Time: 01/20/21  2:53 PM   Result Value Ref Range    Hyaline Casts, UA 2-4 0 - 5 /LPF    WBC, UA 21-30 (A) 0 - 5 /HPF    RBC, UA 3-5 (A) 0 - 2 /HPF    Epithelial Cells, UA 0-2 /HPF    Bacteria, UA 4+ (A) None Seen /HPF   COVID-19    Collection Time: 01/20/21  3:02 PM   Result Value Ref Range    SARS-CoV-2, NAAT Not Detected Not Detected   Lactic Acid, Plasma    Collection Time: 01/20/21  3:10 PM   Result Value Ref Range    Lactic Acid 4.7 (HH) 0.5 - 1.9 mmol/L   EKG 12 Lead    Collection Time: 01/20/21  3:58 PM   Result Value Ref Range    P-R Interval  ms    QRS Duration 88 ms    Q-T Interval 374 ms    QTc Calculation (Bazett) 445 ms    P Axis  degrees    T Axis -121 degrees        Imaging/Diagnostics Last 24 Hours   Ct Head Wo Contrast    Result Date: 1/20/2021  EXAM: CT OF THE HEAD WITHOUT IV CONTRAST 1/20/2021 COMPARISON: Head CT dated January 7, 2020 INDICATION: Female, 80years-old. Altered mental status PROCEDURE: Non contrast enhanced head CT was performed. Radiation dose equals DLP 2095. mGy-cm. Automated exposure control dose reduction technique was implemented. FINDINGS: The patient is severely altered. Despite multiple attempts, this study is essentially nondiagnostic. Within those limitations: There is no large acute hemorrhage or midline shift. No obvious acute hydrocephalus. No obvious midline shift. 1.  Limited examination, severely. 2.  Grossly, no acute intracranial abnormalities.  Signed by  Junella Riedel on 1/20/2021 4:21 PM    Ct Cervical Spine Wo Contrast    Result Date: 1/20/2021  Exam:   CT CERVICAL SPINE WO CONTRAST  Date:  1/20/2021 History:  Female, age  80 years; fall COMPARISON:  None. Findings : This is a severely limited examination due to patient's altered mental status. Within those limitations: There is no obvious acute compression deformity. No obvious dislocation. Multilevel degenerative changes, with disc disease and facet arthropathy. Impression: Severely limited examination. Grossly, no acute compression deformity or dislocation. Signed by Dr Junella Riedel on 1/20/2021 4:27 PM    Ct Abdomen Pelvis W Iv Contrast Additional Contrast? None    Result Date: 1/20/2021  Examination. CT ABDOMEN PELVIS W IV CONTRAST 1/20/2021 3:17 PM History: Abdominal pain and left hip pain. DLP: 443 mGycm. The CT scan of the abdomen and pelvis is performed after intravenous contrast enhancement. The images are acquired in axial plane with subsequent reconstruction in coronal and sagittal plane. There is no previous study for comparison. There are atelectatic changes at bilateral bases right more than the left. There is small bibasilar pleural effusion and lower lobar consolidation, left more than the right. The limited visualized cardiomediastinal structures show moderate cardiomegaly, particularly enlargement of the right and left left atrium and left ventricle. A moderately ectatic ascending thoracic aorta is seen measuring 4.2 cm in diameter. Severe atheromatous changes of coronary arteries are seen. The liver and spleen appear unremarkable. A significantly distended gallbladder seen with moderate enhancement of the wall. No surrounding fat infiltration or stranding. No surrounding fluid. No radiopaque calculi. Common bile duct is not distended. The pancreas appear normal. Normal pancreatic duct is visualized. There are degenerative glands are unremarkable.  There are several well-defined sharply marginated renal nodules/masses in both kidneys. The largest mass in the upper pole of the right kidney measures 6 cm in diameter. The CT density suggest a cyst. The largest mass in the upper pole of the left kidney measures 6.5 cm. The CT density suggest a cyst. No radiopaque calculi. No hydronephrosis. The ureters are not well-visualized in this study. The urinary bladder is completely obscured by the extensive artifacts from the hip arthroplasty hardware. A small partially calcified uterus is seen. No adnexal masses. There is a large hiatal hernia containing a rounded high density object which probably represent a tablet/medication. Into abdominal stomach appear unremarkable and decompressed. Small bowel is nondistended. Appendix is partially visualized and appears unremarkable. Moderate gas and stool is seen in the colon. There is focal thickening of the distal sigmoid colon with narrowing of the lumen. This may represent peristalsis or an evolving neoplasm. There is diverticulosis of the colon. No finding to suggest diverticulitis. The distal sigmoid colon and rectum are suboptimally evaluated due to the artifacts. Severe atheromatous changes of the abdominal aorta and iliac arteries are seen. Atheromatous plaques are seen in the proximal celiac and superior mesenteric arteries with more than 50% diameter stenosis of the celiac trunk. The images reviewed in bone window show moderate diffuse osteopenia of the bony structures. Chronic degenerative changes of the lumbar spine with a moderate dextroscoliosis. There is hardware internal fixation of the left femoral neck fracture. There is incomplete healing of the fracture. There is impaction of the bony fragments. One of the metallic nail is projecting into the superior aspect of the hip joint space. Right hip arthroplasty is incompletely visualized and evaluated. An area of moderate thickening of the wall and resultant narrowing of the distal sigmoid colon. This may be transitional due to peristalsis or an evolving neoplastic process. This may be further evaluated. The diverticulosis of the colon. No evidence for diverticulitis. Multiple well-defined low-density renal lesions probably represent renal cysts. No solid nodule. Evaluate distended gallbladder without stone. A large hiatal hernia. A small left basal pleural effusion. A healing ununited internally fixed fracture of the left femoral neck with hardware complication as detailed above. Moderate diffuse osteopenia. A moderate ectasia of the ascending thoracic aorta. No dissection. Moderate cardiomegaly. Signed by Dr Montserrat Palomino on 1/20/2021 4:38 PM    Cta Pulmonary W Contrast    Result Date: 1/20/2021  CTA PULMONARY W CONTRAST 1/20/2021 3:18 PM HISTORY: Possible PE COMPARISON: None. DLP: 230 mGy cm TECHNIQUE: Helical tomographic images of the chest were obtained after the administration of intravenous contrast following angiogram protocol. Additionally, 3D reformatted images were provided. FINDINGS:  Reason Limited examination. There is no evidence of pulmonary embolus. The ascending aorta measures up to 4.3 cm. Timing of contrast limits evaluation for a dissection. Bilateral peripheral groundglass opacities and consolidative processes in the lower lobes and upper lobes, concerning for infectious process. Moderate cardiomegaly. Coronary artery disease. Trace pericardial effusion. No obvious mediastinal hilar or axillary lymphadenopathy by size criteria. Moderate to significant hiatal hernia. Chronic appearing wedging of T8 vertebral body. Please see report from CT abdomen pelvis dated for intra-abdominal findings. 1.   No evidence pulmonary embolus. 2. Groundglass and organized and consider processes in the peripheries bilaterally, concerning for infection.  Signed by Dr Susie Perez on 1/20/2021 4:44 PM    Xr Hip 2-3 Vw W Pelvis Left    Result Date: 1/20/2021  EXAM: XR HIP 2-3 VW W PELVIS LEFT -- 1/20/2021 2:00 PM HISTORY: 89 years, Female, left hip pain COMPARISON: 12/22/2020, 7/18/2020 TECHNIQUE:  3 images. AP pelvis, AP and lateral views of the left hip FINDINGS:  Right hip replacement with distal stem incompletely visualized. There are 3 left femoral neck screws without evidence of hardware fracture or loosening. One of the screw tips appears to extend beyond the femoral head margin. There is similar shortening of the left femoral neck with a persistent femoral mass lucent fracture line. There is a fracture line extending to the articular surface, which appears new compared to priors. Diffuse bone demineralization and overlying bowel gas limit evaluation of the pelvis. Pubic symphysis and sacroiliac joints anatomic. Degenerative changes in the lumbar spine. 1. Left femoral head and neck fracture, favored to represent an acute on chronic process. This appears increased in conspicuity compared to 12/22/2020, which can be seen with additional interval fracture or osteolysis and healing of the previously described fracture. 2. Diffuse bone demineralization limits evaluation.  Signed by Dr Miguel Schulz on 1/20/2021 5:28 PM      Assessment      Hospital Problems           Last Modified POA    * (Principal) Sepsis due to urinary tract infection ([facility] [address] 1/20/2021 Yes    Essential hypertension 1/20/2021 Yes          Plan     Sepsis   - Suspected source: Urinary tract infection  -Urinalysis reviewed  - Note of elevated blood sugar level on admission, most recent hemoglobin A1c 4.5, likely stress-induced due to underlying infection  - Continue IV antibiotics  - Continue IVF  - Blood cultures pending   - Urine culture pending  - Repeat Lactic Acid , noted to be 4.7 on admission  - Will continue to monitor closely   -COVID-19 screening not detected      Deconditioned state/malnutrition/generalized weakness  -Dietary consultation  -Encourage oral intake  -Daily metabolic profile, monitor and correct electrolytes as needed  -SLP consultation, PT/OT  -Palliative care consultation for possible transition to comfort measures/hospice if no improvement with antibiotic therapy  -CT head reviewed-unremarkable acute findings  -CT cervical spine reviewed-no acute compression deformity or dislocation    Sinus tachycardia  -Continue with patient's home Coreg twice daily  -Continue intravenous fluids  -Routine vitals    Incidental finding left femoral head/neck fracture/osteopenia  -Patient with previous percutaneous fixation January 2020  -Patient does not wish to proceed with surgical measures at current time  -Out of care consultation placed  -Continue with calcium plus vitamin D supplementation    Finding on CT abdomen and pelvis moderate thickening of the wall and resultant narrowing of the distal sigmoid colon, peristalsis versus evolving neoplastic process?  -Patient currently DNR not wishing to pursue further measures  -Palliative care consultation        DVT prophylaxis-Lovenox    Discharge disposition-continue with treatment for underlying sepsis due to urinary source, palliative care consultation requested for possible transition to comfort/home hospice services      Consultations Ordered:  IP CONSULT TO PALLIATIVE CARE      EMR Dragon/Transcription disclaimer:   Much of this encounter note is an electronic transcription/translation of spoken language to printed text.  The electronic translation of spoken language may permit erroneous, or at times, nonsensical words or phrases to be inadvertently transcribed; although attempts have made to review the note for such errors, some may still exist.    Electronically signed by   Noah Manjarrez   Internal Medicine Hospitalist  On 1/20/2021  At 6:30 PM

## 2021-01-20 NOTE — ED PROVIDER NOTES
[facility] 3 RAMANA/VAS/MED  eMERGENCY dEPARTMENT eNCOUnter      Pt Name: Angelito Swain  MRN: [id number]  [place] 9/11/1926  Date of evaluation: 1/20/2021  Provider: Guerline Edouard MD    [address] [place] [address]       Chief Complaint   Patient presents with    Shortness of Breath     Hx of COVID in december, worse over the last week         HISTORY OF PRESENT ILLNESS   (Location/Symptom, Timing/Onset,Context/Setting, Quality, Duration, Modifying Factors, Severity)  Note limiting factors. Angelito Swain is a 80 y.o. female who presents to the emergency department with multiple complaints. Patient is confused. Cannot provide a reliable history. Spoke with patient's son Nadeen Santos. Son tells me that patient had Covid in December and has had gradual decline ever since. Not eating or drinking. Said the patient has complained of abdominal pain. Patient cannot really give me any history. No report of any injuries or falls per patient's son. Patient does indicate she has some pain in her left hip. Son tells me this is chronic from a complication from a prior hip fracture. He said that there was discussion of further surgery but patient declined any further surgery following her initial surgery for hip fracture. Patient has significant contracture of her neck that is chronic and baseline. Patient has had increasing confusion for the past several weeks. HPI    NursingNotes were reviewed. REVIEW OF SYSTEMS    (2-9 systems for level 4, 10 or more for level 5)     Review of Systems   Unable to perform ROS: Mental status change       A complete review of systems was performed and is negative except as noted above in the HPI.        PAST MEDICAL HISTORY     Past Medical History:   Diagnosis Date    Closed right hip fracture, initial encounter ([facility] [address]     Hyperlipidemia     Hypertension          SURGICAL HISTORY       Past Surgical History:   Procedure Laterality Date    HIP PINNING Left 1/8/2020    3 SCREWS/ PERCUTANEOUS HIP PINNING performed by Drake Capone MD at [address] Right 1/18/2018    HIP HEMIARTHROPLASTY performed by Aristides Vidal MD at [address] [place] [address]       Current Discharge Medication List      CONTINUE these medications which have NOT CHANGED    Details   potassium citrate (UROCIT-K) 10 MEQ (1080 MG) extended release tablet Take by mouth 2 times daily      carvedilol (COREG) 25 MG tablet Take 25 mg by mouth 2 times daily (with meals)             ALLERGIES     Patient has no known allergies. FAMILY HISTORY     No family history on file. SOCIAL HISTORY       Social History     Socioeconomic History    Marital status:       Spouse name: Not on file    Number of children: Not on file    Years of education: Not on file    Highest education level: Not on file   Occupational History    Not on file   Social Needs    Financial resource strain: Not on file    Food insecurity     Worry: Not on file     Inability: Not on file    Transportation needs     Medical: Not on file     Non-medical: Not on file   Tobacco Use    Smoking status: Never Smoker    Smokeless tobacco: Never Used   Substance and Sexual Activity    Alcohol use: No    Drug use: No    Sexual activity: Not on file   Lifestyle    Physical activity     Days per week: Not on file     Minutes per session: Not on file    Stress: Not on file   Relationships    Social connections     Talks on phone: Not on file     Gets together: Not on file     Attends Advent service: Not on file     Active member of club or organization: Not on file     Attends meetings of clubs or organizations: Not on file     Relationship status: Not on file    Intimate partner violence     Fear of current or ex partner: Not on file     Emotionally abused: Not on file     Physically abused: Not on file     Forced sexual activity: Not on file   Other Topics Concern    Not on file deficit   Psychiatric:      Comments: Limited exam.  Confused         DIAGNOSTIC RESULTS     EKG: All EKG's are interpreted by the Emergency Department Physician who either signs or Co-signs this chart in the absence of a cardiologist.    Atrial fibrillation. Artifact. Borderline ST changes. RADIOLOGY:   Non-plain film images such as CT, Ultrasound and MRI are read by the radiologist. Plainradiographic images are visualized and preliminarily interpreted by the emergency physician with the below findings:    Interpretation per the Radiologist below, if available at the time of this note:    CTA PULMONARY W CONTRAST   Final Result   1. No evidence pulmonary embolus. 2. Groundglass and organized and consider processes in the peripheries   bilaterally, concerning for infection. Signed by Dr Antonio Bonilla on 1/20/2021 4:44 PM      CT ABDOMEN PELVIS W IV CONTRAST Additional Contrast? None   Final Result   An area of moderate thickening of the wall and resultant   narrowing of the distal sigmoid colon. This may be transitional due to   peristalsis or an evolving neoplastic process. This may be further   evaluated. The diverticulosis of the colon. No evidence for diverticulitis. Multiple well-defined low-density renal lesions probably represent   renal cysts. No solid nodule. Evaluate distended gallbladder without stone. A large hiatal hernia. A small left basal pleural effusion. A healing ununited internally fixed fracture of the left femoral neck   with hardware complication as detailed above. Moderate diffuse   osteopenia. A moderate ectasia of the ascending thoracic aorta. No dissection. Moderate cardiomegaly. Signed by Dr Mag Schilling on 1/20/2021 4:38 PM      CT CERVICAL SPINE WO CONTRAST   Final Result   Impression:   Severely limited examination. Grossly, no acute compression deformity or dislocation.    Signed by Dr Antonio Bonilla on 1/20/2021 4:27 PM      CT Head WO Contrast Final Result   1. Limited examination, severely. 2.  Grossly, no acute intracranial abnormalities. Signed by Dr Jesenia Pak on 1/20/2021 4:21 PM      XR HIP 2-3 VW W PELVIS LEFT   Final Result   1. Left femoral head and neck fracture, favored to represent an acute   on chronic process. This appears increased in conspicuity compared to   12/22/2020, which can be seen with additional interval fracture or   osteolysis and healing of the previously described fracture. 2. Diffuse bone demineralization limits evaluation.    Signed by Dr Pepe Duffy on 1/20/2021 5:28 PM              LABS:  Labs Reviewed   LACTIC ACID, PLASMA - Abnormal; Notable for the following components:       Result Value    Lactic Acid 4.7 (*)     All other components within normal limits    Narrative:     [address] tel[address] ,  Chemistry results called to and read back by [facility] RN in ED, 01/20/2021  15:44, by [facility]   CBC WITH AUTO DIFFERENTIAL - Abnormal; Notable for the following components:    WBC 17.3 (*)     RBC 3.74 (*)     Hemoglobin 11.9 (*)     .4 (*)     MCH 31.8 (*)     MCHC 31.1 (*)     RDW 14.9 (*)     MPV 13.0 (*)     Neutrophils % 89.8 (*)     Lymphocytes % 3.8 (*)     Neutrophils Absolute 15.5 (*)     Lymphocytes Absolute 0.7 (*)     All other components within normal limits   COMPREHENSIVE METABOLIC PANEL W/ REFLEX TO MG FOR LOW K - Abnormal; Notable for the following components:    CO2 20 (*)     Glucose 220 (*)     BUN 26 (*)     Alb 3.1 (*)     All other components within normal limits   URINE RT REFLEX TO CULTURE - Abnormal; Notable for the following components:    Color, UA DARK YELLOW (*)     Clarity, UA TURBID (*)     Bilirubin Urine MODERATE (*)     Blood, Urine LARGE (*)     Protein, UA 30 (*)     Nitrite, Urine POSITIVE (*)     Leukocyte Esterase, Urine MODERATE (*)     All other components within normal limits   MICROSCOPIC URINALYSIS - Abnormal; Notable for the following components:    WBC, UA 21-30 (*)     RBC, UA 3-5 (*)     Bacteria, UA 4+ (*)     All other components within normal limits   LACTATE, SEPSIS - Abnormal; Notable for the following components:    Lactic Acid, Sepsis 2.9 (*)     All other components within normal limits    Narrative:     Yaya Mccarthy tel. [phone],  Chemistry results called to and read back by Eliot Rosales RN on third, 01/20/2021  21:16, by [facility]   PROCALCITONIN - Abnormal; Notable for the following components:    Procalcitonin 0.33 (*)     All other components within normal limits   CULTURE, BLOOD 1   CULTURE, BLOOD 2   CULTURE, URINE   LIPASE   TROPONIN   COVID-19   CBC WITH AUTO DIFFERENTIAL   COMPREHENSIVE METABOLIC PANEL W/ REFLEX TO MG FOR LOW K   LACTATE, SEPSIS   PHOSPHORUS       All other labs were within normal range or not returned as of this dictation. EMERGENCY DEPARTMENT COURSE and DIFFERENTIALDIAGNOSIS/MDM:   Vitals:    Vitals:    01/20/21 1615 01/20/21 1700 01/20/21 1900 01/20/21 2100   BP: (!) 115/97 122/60 108/86 (!) 96/54   Pulse: 127 122  112   Resp: 25 21 20   Temp:    96.7 °F (35.9 °C)   TempSrc:    Temporal   SpO2:  95% 97% 95%   Weight:           MDM  Number of Diagnoses or Management Options  Critical Care  Total time providing critical care: 30-74 minutes    Patient's case discussed with hospitalist, Dr. Rosa Hernandez, who will admit. Looks like she is in A. fib. Heart rate is improving after IV fluids. Dr. Rosa Hernandez agreeable with Lovenox but wants to hold off on Cardizem for now given that heart rate is improving with fluids. Patient's son, Maribel Robertson, spoke with patient's nurse, [place], who indicated that he wishes his mother to be DNR and has considered hospice. With this in mind, Dr. Rosa Hernandez will plan to admit to the floor. CONSULTS:  IP CONSULT TO PALLIATIVE CARE    PROCEDURES:  Unless otherwise notedbelow, none     Procedures    FINAL IMPRESSION     1. Altered mental status, unspecified altered mental status type    2.  Sepsis, due to unspecified organism, unspecified whether acute organ dysfunction present ([facility] [address]    3. Atrial fibrillation, unspecified type ([facility] [address]    4. Urinary tract infection without hematuria, site unspecified    5. Abnormal CT scan    6.  Abnormal x-ray          DISPOSITION/PLAN   DISPOSITION Admitted 01/20/2021 06:23:25 PM      PATIENT REFERRED TO:  @FUP@    DISCHARGE MEDICATIONS:  Current Discharge Medication List             (Please note that portions of this note were completed with a voice recognition program.  Efforts were made to edit the dictations butoccasionally words are mis-transcribed.)    Guerline Edouard MD (electronically signed)  AttendingEmergency Physician         Guerline Edouard MD  01/20/21 2658

## 2021-01-21 PROBLEM — Z51.5 PALLIATIVE CARE PATIENT: Status: ACTIVE | Noted: 2021-01-21

## 2021-01-21 PROBLEM — E43 SEVERE MALNUTRITION (HCC): Status: ACTIVE | Noted: 2021-01-21

## 2021-01-21 LAB
ALBUMIN SERPL-MCNC: 2.7 G/DL (ref 3.5–5.2)
ALP BLD-CCNC: 66 U/L (ref 35–104)
ALT SERPL-CCNC: 5 U/L (ref 5–33)
ANION GAP SERPL CALCULATED.3IONS-SCNC: 11 MMOL/L (ref 7–19)
AST SERPL-CCNC: 13 U/L (ref 5–32)
BASOPHILS ABSOLUTE: 0 K/UL (ref 0–0.2)
BASOPHILS RELATIVE PERCENT: 0.1 % (ref 0–1)
BILIRUB SERPL-MCNC: 0.7 MG/DL (ref 0.2–1.2)
BUN BLDV-MCNC: 26 MG/DL (ref 8–23)
CALCIUM SERPL-MCNC: 7.8 MG/DL (ref 8.2–9.6)
CHLORIDE BLD-SCNC: 112 MMOL/L (ref 98–111)
CO2: 19 MMOL/L (ref 22–29)
CREAT SERPL-MCNC: 0.5 MG/DL (ref 0.5–0.9)
EOSINOPHILS ABSOLUTE: 0 K/UL (ref 0–0.6)
EOSINOPHILS RELATIVE PERCENT: 0 % (ref 0–5)
GFR AFRICAN AMERICAN: >59
GFR NON-AFRICAN AMERICAN: >60
GLUCOSE BLD-MCNC: 87 MG/DL (ref 74–109)
HCT VFR BLD CALC: 30.9 % (ref 37–47)
HEMOGLOBIN: 10 G/DL (ref 12–16)
IMMATURE GRANULOCYTES #: 0.1 K/UL
LACTIC ACID, SEPSIS: 1.4 MG/DL (ref 0.5–1.9)
LYMPHOCYTES ABSOLUTE: 0.6 K/UL (ref 1.1–4.5)
LYMPHOCYTES RELATIVE PERCENT: 4.5 % (ref 20–40)
MCH RBC QN AUTO: 32.4 PG (ref 27–31)
MCHC RBC AUTO-ENTMCNC: 32.4 G/DL (ref 33–37)
MCV RBC AUTO: 100 FL (ref 81–99)
MONOCYTES ABSOLUTE: 1 K/UL (ref 0–0.9)
MONOCYTES RELATIVE PERCENT: 7 % (ref 0–10)
NEUTROPHILS ABSOLUTE: 12 K/UL (ref 1.5–7.5)
NEUTROPHILS RELATIVE PERCENT: 87.5 % (ref 50–65)
PDW BLD-RTO: 15.2 % (ref 11.5–14.5)
PHOSPHORUS: 2.5 MG/DL (ref 2.5–4.5)
PLATELET # BLD: 172 K/UL (ref 130–400)
PMV BLD AUTO: 12 FL (ref 9.4–12.3)
POTASSIUM REFLEX MAGNESIUM: 3.8 MMOL/L (ref 3.5–5)
RBC # BLD: 3.09 M/UL (ref 4.2–5.4)
SODIUM BLD-SCNC: 142 MMOL/L (ref 136–145)
TOTAL PROTEIN: 5 G/DL (ref 6.6–8.7)
WBC # BLD: 13.7 K/UL (ref 4.8–10.8)

## 2021-01-21 PROCEDURE — 36415 COLL VENOUS BLD VENIPUNCTURE: CPT

## 2021-01-21 PROCEDURE — 80053 COMPREHEN METABOLIC PANEL: CPT

## 2021-01-21 PROCEDURE — 85025 COMPLETE CBC W/AUTO DIFF WBC: CPT

## 2021-01-21 PROCEDURE — 6360000002 HC RX W HCPCS: Performed by: FAMILY MEDICINE

## 2021-01-21 PROCEDURE — 1210000000 HC MED SURG R&B

## 2021-01-21 PROCEDURE — 92610 EVALUATE SWALLOWING FUNCTION: CPT

## 2021-01-21 PROCEDURE — 2580000003 HC RX 258: Performed by: FAMILY MEDICINE

## 2021-01-21 PROCEDURE — 84100 ASSAY OF PHOSPHORUS: CPT

## 2021-01-21 PROCEDURE — 99222 1ST HOSP IP/OBS MODERATE 55: CPT | Performed by: NURSE PRACTITIONER

## 2021-01-21 PROCEDURE — 83605 ASSAY OF LACTIC ACID: CPT

## 2021-01-21 RX ORDER — AZITHROMYCIN 250 MG/1
500 TABLET, FILM COATED ORAL EVERY 24 HOURS
Status: DISCONTINUED | OUTPATIENT
Start: 2021-01-21 | End: 2021-01-24 | Stop reason: HOSPADM

## 2021-01-21 RX ORDER — ACETAMINOPHEN 325 MG/1
650 TABLET ORAL EVERY 4 HOURS PRN
Status: DISCONTINUED | OUTPATIENT
Start: 2021-01-21 | End: 2021-01-24 | Stop reason: HOSPADM

## 2021-01-21 RX ADMIN — CEFTRIAXONE 1000 MG: 1 INJECTION, POWDER, FOR SOLUTION INTRAMUSCULAR; INTRAVENOUS at 07:58

## 2021-01-21 RX ADMIN — ENOXAPARIN SODIUM 40 MG: 40 INJECTION SUBCUTANEOUS at 07:58

## 2021-01-21 RX ADMIN — SODIUM CHLORIDE: 9 INJECTION, SOLUTION INTRAVENOUS at 17:01

## 2021-01-21 NOTE — PLAN OF CARE
Problem: Nutrition  Goal: Optimal nutrition therapy  Outcome: Ongoing   Nutrition Problem #1: Severe malnutrition, In context of chronic illness  Intervention: Food and/or Nutrient Delivery: Continue Current Diet, Start Oral Nutrition Supplement  Nutritional Goals: PO intake >50%, wt stable or increase 1-3 lbs/wk

## 2021-01-21 NOTE — PROGRESS NOTES
4 Eyes Skin Assessment    Arthor Duane Jimmie Ali is being assessed upon: Admission    I agree that I, Loc Abdul, along with [facility] (either 2 RN's or 1 LPN and 1 RN) have performed a thorough Head to Toe Skin Assessment on the patient. ALL assessment sites listed below have been assessed. Areas assessed by both nurses:     [x]   Head, Face, and Ears   [x]   Shoulders, Back, and Chest  [x]   Arms, Elbows, and Hands   [x]   Coccyx, Sacrum, and Ischium  [x]   Legs, Feet, and Heels    Does the Patient have Skin Breakdown? Yes, wound(s) noted upon assessment. It is the responsibility of the Primary Nurse to assure that the following documentation, preventions, orders, and consults are complete on the above noted wound(s): Wound LDA initiated. LDA Flowsheet Documentation includes the Yudi-wound, Wound Assessment, Measurements, Dressing Treatment, Drainage, and Color.     Jaden Prevention initiated: Yes  Wound Care Orders initiated: No    WOC nurse consulted for Pressure Injury (Stage 3,4, Unstageable, DTI, NWPT, and Complex wounds) and New or Established Ostomies: No        Primary Nurse eSignature: Loc Abdul RN on 1/20/2021 at 9:27 PM      Co-Signer eSignature: Electronically signed by Damon Snyder LPN on 5/51/33 at 2:84 PM CST

## 2021-01-21 NOTE — PROGRESS NOTES
Per report, no DNR order written in paper, but per son, that is the wishes of the patient.     Electronically signed by Krystal Dickerson RN on 1/20/2021 at 8:18 PM

## 2021-01-21 NOTE — CARE COORDINATION
HH referral received. Spoke with patient's son, Shelbi Panchal regarding referral.  He states that patient does not want [facility] at this time. States that patient lives with family and has support of several family members and all needs are met at this time. No further HH needs identified.  Electronically signed by Sheree Almendarez on 1/21/21 at 3:34 PM CST

## 2021-01-21 NOTE — CONSULTS
Palliative Care Consult Note  1/21/2021 12:51 PM  Subjective:   Admit Date: 1/20/2021  PCP: NAOMI Villela NP    Reason For Consult: Goals of care,  Family Support    Requesting Physician: Dr. Maria Teresa Rudolph    History Obtained From: EMR    Chief Complaint: AMS    History of Present Illness: Patient is a 77-year-old female with a PMH of HTN, HLD, Hx of Covid infection and 12/2020, that presented to the ED on 1/20/2021 with complaints of gradual decline, poor nutritional intake, AMS. Patient is normally conversant at baseline but was lethargic and confused upon arrival to ED. Pulmonary CT negative for pulmonary embolus but did reveal groundglass  Opacities in the peripheries bilaterally. CT of the head negative for acute processes. Labs revealed lactic acid 4.7, WBC 17.3, urinalysis suggestive of UTI, calcitonin 0.33, blood and urine cultures obtained, COVID-19 negative. She was initiated on IVF and IV ABX. Family had discussed possible wish for hospice care at discharge. Palliative care was consulted to assist with discussions regarding goals of care and provide patient/family support. Past Medical History:        Diagnosis Date    Closed right hip fracture, initial encounter ([facility] [address]     Hyperlipidemia     Hypertension     Palliative care patient 01/21/2021       Past Surgical History:        Procedure Laterality Date    HIP PINNING Left 1/8/2020    3 SCREWS/ PERCUTANEOUS HIP PINNING performed by Levon Herron MD at [facility] Right 1/18/2018    HIP HEMIARTHROPLASTY performed by Anamika Reyes MD at [facility]:  Patient has no known allergies. Social History:    TOBACCO:   reports that she has never smoked. She has never used smokeless tobacco.  ETOH:   reports no history of alcohol use. Family History:   No family history on file.     Palliative Performance Score:  30%    Review of Systems   Unable to obtain due to acuity of care and AMS, patient does deny pain or discomfort and is requesting something to drink    Palliative Review of Advance Directives:     Surrogate Decision Maker:yes, adult children as NOK    Durable Power of :no    Advanced Directives/Living Olivares: no    Out of hospital medical orders in place to reflect resuscitation status (MOLST/POLST): no    Information Sharing:  Patient's awareness of illness:  [] Terminal [] Life-Threatening [] Serious [] Non life-threatening [] Not serious   [x] Not discussed    Family awareness of illness:   [] Terminal [] Life-Threatening [x] Serious [] Nonlife-threatening [] Not serious   [] Not discussed    DIET DYSPHAGIA PUREED;   Dietary Nutrition Supplements: Standard High Calorie Oral Supplement    Medications:   sodium chloride 75 mL/hr (01/20/21 1745)    sodium chloride 100 mL/hr at 01/20/21 2059     Current Facility-Administered Medications   Medication Dose Route Frequency Provider Last Rate Last Admin    acetaminophen (TYLENOL) tablet 650 mg  650 mg Oral Q4H PRN Femi Vance MD        0.9 % sodium chloride infusion   Intravenous Continuous Benjamin Medina MD 75 mL/hr at 01/20/21 1745 75 mL/hr at 01/20/21 1745    carvedilol (COREG) tablet 25 mg  25 mg Oral BID  Femi Vance MD        sodium chloride flush 0.9 % injection 10 mL  10 mL Intravenous 2 times per day Femi Vance MD   10 mL at 01/20/21 2059    sodium chloride flush 0.9 % injection 10 mL  10 mL Intravenous PRN Femi Vance MD        0.9 % sodium chloride infusion   Intravenous Continuous Femi Vance  mL/hr at 01/20/21 2059 New Bag at 01/20/21 2059    cefTRIAXone (ROCEPHIN) 1,000 mg in sterile water 10 mL IV syringe  1,000 mg Intravenous Q24H Femi Vance MD   1,000 mg at 01/21/21 4348    calcium-vitamin D (OSCAL-500) 500-200 MG-UNIT per tablet 1 tablet  1 tablet Oral Daily Femi Vance MD   1 tablet at 01/20/21 2579    enoxaparin (LOVENOX) injection 40 mg  40 mg Subcutaneous Daily Jeaneth Samayoa MD   40 mg at 01/21/21 0758        Labs:     Recent Labs     01/20/21  1413 01/21/21 0057   WBC 17.3* 13.7*   RBC 3.74* 3.09*   HGB 11.9* 10.0*   HCT 38.3 30.9*   .4* 100.0*   MCH 31.8* 32.4*   MCHC 31.1* 32.4*    172     Recent Labs     01/20/21  1413 01/21/21  0057    142   K 4.6 3.8   ANIONGAP 14 11    112*   CO2 20* 19*   BUN 26* 26*   CREATININE 0.6 0.5   GLUCOSE 220* 87   CALCIUM 8.9 7.8*     Recent Labs     01/21/21 0057   PHOS 2.5     Recent Labs     01/20/21  1413 01/21/21  0057   AST 17 13   ALT 8 5   BILITOT 1.1 0.7   ALKPHOS 84 66     ABGs:No results for input(s): PHART, JYH1XAA, PO2ART, JEJ5MXG, BEART, HGBAE, R7IOLEVL, CARBOXHGBART, 02THERAPY in the last 72 hours. HgBA1c: No results for input(s): LABA1C in the last 72 hours. FLP:  No results found for: TRIG, HDL, LDLCALC, LDLDIRECT, LABVLDL  TSH:    Lab Results   Component Value Date    TSH 2.160 12/17/2017     Troponin T:   Recent Labs     01/20/21  1413   TROPONINI 0.02     INR: No results for input(s): INR in the last 72 hours.     Objective:   Vitals: /69   Pulse 88   Temp 97.8 °F (36.6 °C) (Temporal)   Resp 16   Ht 5' 4\" (1.626 m)   Wt 110 lb (49.9 kg)   SpO2 99%   BMI 18.88 kg/m²   24HR INTAKE/OUTPUT:      Intake/Output Summary (Last 24 hours) at 1/21/2021 1251  Last data filed at 1/21/2021 8841  Gross per 24 hour   Intake 2652 ml   Output --   Net 2652 ml     Physical Exam      General appearance: alert and cooperative with exam, appears elderly, no acute distress, frail  HEENT: atraumatic, eyes with clear conjunctiva and normal lids, pupils and irises normal, external ears and nose are normal,lips normal.  Neck: with severe contracture  Lungs: no increased work of breathing, Diminished bilaterally  Heart: regular rate and rhythm, murmur noted  Abdomen: soft, non-tender; bowel sounds normal; no masses,  no organomegaly  Genitourinary: No bladder fullness, masses, or tenderness  Extremities: extremities normal, atraumatic, no cyanosis or edema  Neurologic: Alert to self, conversant but does not answer questions appropriately  Psychiatric: Alert to self, no agitation or anxiety   skin: warm, dry    Assessment and Plan:   Principal Problem:    Sepsis due to urinary tract infection ([facility] [address]  Active Problems:    Essential hypertension    Palliative care patient    Severe malnutrition ([facility] [address]  Resolved Problems:    * No resolved hospital problems. *      Visit Summary: I saw the patient at the bedside with no family present. Patient is alert and very conversant although she does not answer questions appropriately. She tends to talk about situations that may have occurred in the past, speaks about her grandmother as if she were still alive and her children as school aged. She does indicate that she is thirsty and would like a drink, patient's lunch arrived and PCA present to assist with feeding. I contacted the patient's son, Chava Miller, very pleasant conversation and he was able to provide healthcare history and patient's baseline status. She does live at home with several family members to help care for her, has had minimal health issues up until recently, feels she has declined since her previous DX of Covid and December. Patient also had previous L hip percutaneous screw fixation approximately 1 year ago. I was able to provide update on patient's current status, mental status has improved and she was very conversant this afternoon, discussed current TX with ABX and reviewed urinalysis and chest CT. I spoke with Tyson Lyles about mention of possible hospice care at discharge and discussed hospice services, qualifying diagnoses, and appropriate times to enroll in hospice. Tyson Lyles states that he has discussed with his family members and do not feel that they are ready to pursue hospice services at this time.   I have encouraged him to look for cues related to patient decline or complications, need for increased support and care for the patient, and he is aware that hospice services are an option at any time but he is not currently interested in pursuing hospice for the patient. Additional services for home supports have been offered on previous visits and family has declined those as well. It appears they do have good home support and have cared well for the patient, they do recognize that she is aging and declining. Mr. Tiki Stallings was appreciative of the call, all questions answered and emotional support and encouragement were provided. Attending notified of the outcome of my visit. Palliative care will follow. Recommendations:   1. GOC is for patient to return home with current family support. They are very aware of the option for hospice and support provided but are not ready to pursue hospice at this time. Thank you for consulting palliative care and allowing us to participate in the care of the patient.       CounselingTopics: Goals of care, Code Status, Disease process education, pt/family support    Time Spent Counseling > 50%:  YES                                     Total Time Spent: 56 min    Electronically signed by NAOMI Marie on 1/21/2021 at 12:51 PM    (Please note that portions of this note were completed with a voice recognition program.  Efforts were made to edit the dictations but occasionally words are mis-transcribed.)

## 2021-01-21 NOTE — PROGRESS NOTES
seconds in length. It is noted that patient exhibited frequent oral holding, with both consistencies, prior to initiation of oral transit. Pharyngeal Phase  Suspected Swallow Delay: Puree (Suspect secondary to oral transit times.)  Laryngeal Elevation: (Patient exhibited sluggish, moderately decreased laryngeal elevation for swallow airway protection.)  Pharyngeal Phase - Comment: No outward S/S penetration/aspiration was noted with any puree consistency trial or thin H2O trial presented during assessment this date. At this time, would recommend downgrade to puree consistency. Continue thin liquids. TOTAL FEED. Recommend meds crushed in pudding/applesauce. Will continue to follow.     Electronically signed by CIRO Light on 1/21/2021 at 1:36 PM

## 2021-01-21 NOTE — PROGRESS NOTES
Progress Note  Date:2021       Room:0333/333-01  Patient Evan Jarvis     YOB: 1926     Age:89 y.o. Subjective    Subjective patient seen and examined this a.m. appears more alert since ER presentation, however still in fatigue sick clinical state. PCA present at the bedside bathing patient. Patient does state that she is fatigued, again explained to her ongoing treatment of urinary tract infection improvement from sepsis standpoint. Cumulative hospital course: Patient was admitted , brought in by son due to continued clinical decline patient son reporting this has been started since diagnosis of Covid back in 2020. Work-up in the emergency room revealed patient to meet sepsis criteria likely due to underlying urinary tract infection. CTA was obtained which ruled out pulmonary embolism did reveal groundglass opacities in the peripheral fields bilaterally. CODE STATUS is DNR, patient noted to be sinus tachycardic which improved with fluid hydration as well as beta-blocker modality. Patient was admitted to medical floor as Covid screening was negative in the emergency room. Patient son requesting to talk with palliative services as he would ultimately like patient to return home under comfort measures. Continues on Ceftriaxone, UCx in progress, appreciate PT/OT/SLP evaluations, dietary consultation. Review of Systems   Unable to perform ROS: Dementia     Objective         Vitals Last 24 Hours:  TEMPERATURE:  Temp  Av.2 °F (36.2 °C)  Min: 96.7 °F (35.9 °C)  Max: 97.8 °F (36.6 °C)  RESPIRATIONS RANGE: Resp  Av.1  Min: 20  Max: 41  PULSE OXIMETRY RANGE: SpO2  Av.3 %  Min: 80 %  Max: 98 %  PULSE RANGE: Pulse  Av.5  Min: 99  Max: 154  BLOOD PRESSURE RANGE: Systolic (66MZZ), CPR:783 , Min:86 , MDA:766   ; Diastolic (58YRK), APX:47, Min:54, Max:97    I/O (24Hr):     Intake/Output Summary (Last 24 hours) at 2021 1101  Last data filed at 1/21/2021 0953  Gross per 24 hour   Intake 2652 ml   Output --   Net 2652 ml       Physical Exam  Vitals signs and nursing note reviewed. Constitutional:       Appearance: She is ill-appearing. Comments: Fatigued appearing, resting in bed  HENT:      Head: Normocephalic and atraumatic.   -Severe torticollis  Eyes:      General:         Right eye: No discharge. Left eye: No discharge. Cardiovascular:      Rate and Rhythm: Tachycardia present. Pulses: Normal pulses. Comments: Sinus per telemetry monitor  Abdominal:      General: Bowel sounds are normal.      Tenderness: There is no guarding or rebound. Musculoskeletal:      Right lower leg: No edema. Left lower leg: No edema. Skin:     Coloration: Skin is pale. Stage 2 pressure injuries to coccyx and buttocks  Neurological:      Mental Status: She is alert. Mental status is at baseline. Comments: Underlying dementia evident     Labs/Imaging/Diagnostics    Labs:  CBC:  Recent Labs     01/20/21  1413 01/21/21  0057   WBC 17.3* 13.7*   RBC 3.74* 3.09*   HGB 11.9* 10.0*   HCT 38.3 30.9*   .4* 100.0*   RDW 14.9* 15.2*    172     CHEMISTRIES:  Recent Labs     01/20/21  1413 01/21/21  0057    142   K 4.6 3.8    112*   CO2 20* 19*   BUN 26* 26*   CREATININE 0.6 0.5   GLUCOSE 220* 87   PHOS  --  2.5     PT/INR:No results for input(s): PROTIME, INR in the last 72 hours. APTT:No results for input(s): APTT in the last 72 hours. LIVER PROFILE:  Recent Labs     01/20/21  1413 01/21/21  0057   AST 17 13   ALT 8 5   BILITOT 1.1 0.7   ALKPHOS 84 66       Imaging Last 24 Hours:  Ct Head Wo Contrast    Result Date: 1/20/2021  EXAM: CT OF THE HEAD WITHOUT IV CONTRAST 1/20/2021 COMPARISON: Head CT dated January 7, 2020 INDICATION: Female, 80years-old. Altered mental status PROCEDURE: Non contrast enhanced head CT was performed. Radiation dose equals DLP 2095. mGy-cm.   Automated exposure control dose reduction technique was implemented. FINDINGS: The patient is severely altered. Despite multiple attempts, this study is essentially nondiagnostic. Within those limitations: There is no large acute hemorrhage or midline shift. No obvious acute hydrocephalus. No obvious midline shift. 1.  Limited examination, severely. 2.  Grossly, no acute intracranial abnormalities. Signed by Dr Ana Garcia on 1/20/2021 4:21 PM    Ct Cervical Spine Wo Contrast    Result Date: 1/20/2021  Exam:   CT CERVICAL SPINE WO CONTRAST  Date:  1/20/2021 History:  Female, age  80 years; fall COMPARISON:  None. Findings : This is a severely limited examination due to patient's altered mental status. Within those limitations: There is no obvious acute compression deformity. No obvious dislocation. Multilevel degenerative changes, with disc disease and facet arthropathy. Impression: Severely limited examination. Grossly, no acute compression deformity or dislocation. Signed by Dr Ana Garcia on 1/20/2021 4:27 PM    Ct Abdomen Pelvis W Iv Contrast Additional Contrast? None    Result Date: 1/20/2021  Examination. CT ABDOMEN PELVIS W IV CONTRAST 1/20/2021 3:17 PM History: Abdominal pain and left hip pain. DLP: 443 mGycm. The CT scan of the abdomen and pelvis is performed after intravenous contrast enhancement. The images are acquired in axial plane with subsequent reconstruction in coronal and sagittal plane. There is no previous study for comparison. There are atelectatic changes at bilateral bases right more than the left. There is small bibasilar pleural effusion and lower lobar consolidation, left more than the right. The limited visualized cardiomediastinal structures show moderate cardiomegaly, particularly enlargement of the right and left left atrium and left ventricle. A moderately ectatic ascending thoracic aorta is seen measuring 4.2 cm in diameter. Severe atheromatous changes of coronary arteries are seen.  The liver and spleen appear unremarkable. A significantly distended gallbladder seen with moderate enhancement of the wall. No surrounding fat infiltration or stranding. No surrounding fluid. No radiopaque calculi. Common bile duct is not distended. The pancreas appear normal. Normal pancreatic duct is visualized. There are degenerative glands are unremarkable. There are several well-defined sharply marginated renal nodules/masses in both kidneys. The largest mass in the upper pole of the right kidney measures 6 cm in diameter. The CT density suggest a cyst. The largest mass in the upper pole of the left kidney measures 6.5 cm. The CT density suggest a cyst. No radiopaque calculi. No hydronephrosis. The ureters are not well-visualized in this study. The urinary bladder is completely obscured by the extensive artifacts from the hip arthroplasty hardware. A small partially calcified uterus is seen. No adnexal masses. There is a large hiatal hernia containing a rounded high density object which probably represent a tablet/medication. Into abdominal stomach appear unremarkable and decompressed. Small bowel is nondistended. Appendix is partially visualized and appears unremarkable. Moderate gas and stool is seen in the colon. There is focal thickening of the distal sigmoid colon with narrowing of the lumen. This may represent peristalsis or an evolving neoplasm. There is diverticulosis of the colon. No finding to suggest diverticulitis. The distal sigmoid colon and rectum are suboptimally evaluated due to the artifacts. Severe atheromatous changes of the abdominal aorta and iliac arteries are seen. Atheromatous plaques are seen in the proximal celiac and superior mesenteric arteries with more than 50% diameter stenosis of the celiac trunk. The images reviewed in bone window show moderate diffuse osteopenia of the bony structures. Chronic degenerative changes of the lumbar spine with a moderate dextroscoliosis.  There is hardware internal fixation of the left femoral neck fracture. There is incomplete healing of the fracture. There is impaction of the bony fragments. One of the metallic nail is projecting into the superior aspect of the hip joint space. Right hip arthroplasty is incompletely visualized and evaluated. An area of moderate thickening of the wall and resultant narrowing of the distal sigmoid colon. This may be transitional due to peristalsis or an evolving neoplastic process. This may be further evaluated. The diverticulosis of the colon. No evidence for diverticulitis. Multiple well-defined low-density renal lesions probably represent renal cysts. No solid nodule. Evaluate distended gallbladder without stone. A large hiatal hernia. A small left basal pleural effusion. A healing ununited internally fixed fracture of the left femoral neck with hardware complication as detailed above. Moderate diffuse osteopenia. A moderate ectasia of the ascending thoracic aorta. No dissection. Moderate cardiomegaly. Signed by Dr Ammon Best on 1/20/2021 4:38 PM    Cta Pulmonary W Contrast    Result Date: 1/20/2021  CTA PULMONARY W CONTRAST 1/20/2021 3:18 PM HISTORY: Possible PE COMPARISON: None. DLP: 230 mGy cm TECHNIQUE: Helical tomographic images of the chest were obtained after the administration of intravenous contrast following angiogram protocol. Additionally, 3D reformatted images were provided. FINDINGS:  Reason Limited examination. There is no evidence of pulmonary embolus. The ascending aorta measures up to 4.3 cm. Timing of contrast limits evaluation for a dissection. Bilateral peripheral groundglass opacities and consolidative processes in the lower lobes and upper lobes, concerning for infectious process. Moderate cardiomegaly. Coronary artery disease. Trace pericardial effusion. No obvious mediastinal hilar or axillary lymphadenopathy by size criteria. Moderate to significant hiatal hernia. Chronic appearing wedging of T8 vertebral body.  Please see report from CT abdomen pelvis dated for intra-abdominal findings. 1.   No evidence pulmonary embolus. 2. Groundglass and organized and consider processes in the peripheries bilaterally, concerning for infection. Signed by Dr Gracia Chapa on 1/20/2021 4:44 PM    Xr Hip 2-3 Vw W Pelvis Left    Result Date: 1/20/2021  EXAM: XR HIP 2-3 VW W PELVIS LEFT -- 1/20/2021 2:00 PM HISTORY: 89 years, Female, left hip pain COMPARISON: 12/22/2020, 7/18/2020 TECHNIQUE:  3 images. AP pelvis, AP and lateral views of the left hip FINDINGS:  Right hip replacement with distal stem incompletely visualized. There are 3 left femoral neck screws without evidence of hardware fracture or loosening. One of the screw tips appears to extend beyond the femoral head margin. There is similar shortening of the left femoral neck with a persistent femoral mass lucent fracture line. There is a fracture line extending to the articular surface, which appears new compared to priors. Diffuse bone demineralization and overlying bowel gas limit evaluation of the pelvis. Pubic symphysis and sacroiliac joints anatomic. Degenerative changes in the lumbar spine. 1. Left femoral head and neck fracture, favored to represent an acute on chronic process. This appears increased in conspicuity compared to 12/22/2020, which can be seen with additional interval fracture or osteolysis and healing of the previously described fracture. 2. Diffuse bone demineralization limits evaluation.  Signed by Dr Kaleb Tesfaye on 1/20/2021 5:28 PM    Assessment//Plan           Hospital Problems           Last Modified POA    * (Principal) Sepsis due to urinary tract infection ([address] 1/20/2021 Yes    Essential hypertension 1/20/2021 Yes    Palliative care patient 1/21/2021 Yes        Sepsis/acute cystitis with hematuria  -Sepsis resolved  - Suspected source: Urinary tract infection  - Urinalysis reviewed, urine culture in process, blood culture in process  - Continue IV antibiotics  - Continue IVF  -Actiq acid is normalized  - Will continue to monitor closely   -COVID-19 screening not detected        Deconditioned state/malnutrition/generalized weakness  -Dietary consultation  -Encourage oral intake  -Daily metabolic profile, monitor and correct electrolytes as needed  -SLP consultation, PT/OT  -Palliative care consultation for possible transition to comfort measures/hospice if no improvement with antibiotic therapy  -CT head reviewed-unremarkable acute findings  -CT cervical spine reviewed-no acute compression deformity or dislocation     Sinus tachycardia  -Improved  -Continue with patient's home Coreg twice daily  -Continue intravenous fluids  -Routine vitals     Incidental finding left femoral head/neck fracture/osteopenia  -Patient with previous percutaneous fixation January 2020  -Patient does not wish to proceed with surgical measures at current time  -Palliative consultation placed  -Continue with calcium plus vitamin D supplementation     Finding on CT abdomen and pelvis moderate thickening of the wall and resultant narrowing of the distal sigmoid colon, peristalsis versus evolving neoplastic process?  -Patient currently DNR not wishing to pursue further measures  -Palliative care consultation           DVT prophylaxis-Lovenox     Discharge disposition-continue with treatment for underlying sepsis due to urinary source, palliative care consultation requested for possible transition to comfort/home hospice services    EMR Dragon/Transcription disclaimer:   Much of this encounter note is an electronic transcription/translation of spoken language to printed text.  The electronic translation of spoken language may permit erroneous, or at times, nonsensical words or phrases to be inadvertently transcribed; although attempts have made to review the note for such errors, some may still exist.    Electronically signed by   Joy Funez   Internal Medicine Hospitalist  On 1/21/2021  At 11:01 AM

## 2021-01-22 LAB
ALBUMIN SERPL-MCNC: 2.8 G/DL (ref 3.5–5.2)
ALP BLD-CCNC: 65 U/L (ref 35–104)
ALT SERPL-CCNC: 6 U/L (ref 5–33)
ANION GAP SERPL CALCULATED.3IONS-SCNC: 12 MMOL/L (ref 7–19)
AST SERPL-CCNC: 14 U/L (ref 5–32)
BASOPHILS ABSOLUTE: 0 K/UL (ref 0–0.2)
BASOPHILS RELATIVE PERCENT: 0.1 % (ref 0–1)
BILIRUB SERPL-MCNC: 0.5 MG/DL (ref 0.2–1.2)
BUN BLDV-MCNC: 22 MG/DL (ref 8–23)
CALCIUM SERPL-MCNC: 7.8 MG/DL (ref 8.2–9.6)
CHLORIDE BLD-SCNC: 116 MMOL/L (ref 98–111)
CO2: 17 MMOL/L (ref 22–29)
CREAT SERPL-MCNC: 0.4 MG/DL (ref 0.5–0.9)
EOSINOPHILS ABSOLUTE: 0 K/UL (ref 0–0.6)
EOSINOPHILS RELATIVE PERCENT: 0.2 % (ref 0–5)
FOLATE: 2.6 NG/ML (ref 4.8–37.3)
GFR AFRICAN AMERICAN: >59
GFR NON-AFRICAN AMERICAN: >60
GLUCOSE BLD-MCNC: 107 MG/DL (ref 70–99)
GLUCOSE BLD-MCNC: 126 MG/DL (ref 70–99)
GLUCOSE BLD-MCNC: 192 MG/DL (ref 70–99)
GLUCOSE BLD-MCNC: 258 MG/DL (ref 70–99)
GLUCOSE BLD-MCNC: 352 MG/DL (ref 70–99)
GLUCOSE BLD-MCNC: 54 MG/DL (ref 74–109)
GLUCOSE BLD-MCNC: 95 MG/DL (ref 70–99)
HCT VFR BLD CALC: 29 % (ref 37–47)
HEMOGLOBIN: 9.2 G/DL (ref 12–16)
IMMATURE GRANULOCYTES #: 0.1 K/UL
LYMPHOCYTES ABSOLUTE: 0.8 K/UL (ref 1.1–4.5)
LYMPHOCYTES RELATIVE PERCENT: 6.9 % (ref 20–40)
MAGNESIUM: 1.9 MG/DL (ref 1.7–2.3)
MCH RBC QN AUTO: 31.7 PG (ref 27–31)
MCHC RBC AUTO-ENTMCNC: 31.7 G/DL (ref 33–37)
MCV RBC AUTO: 100 FL (ref 81–99)
MONOCYTES ABSOLUTE: 1 K/UL (ref 0–0.9)
MONOCYTES RELATIVE PERCENT: 8.5 % (ref 0–10)
NEUTROPHILS ABSOLUTE: 9.4 K/UL (ref 1.5–7.5)
NEUTROPHILS RELATIVE PERCENT: 83.4 % (ref 50–65)
PDW BLD-RTO: 15.8 % (ref 11.5–14.5)
PERFORMED ON: ABNORMAL
PERFORMED ON: NORMAL
PHOSPHORUS: 2.6 MG/DL (ref 2.5–4.5)
PLATELET # BLD: 172 K/UL (ref 130–400)
PMV BLD AUTO: 12.5 FL (ref 9.4–12.3)
POTASSIUM REFLEX MAGNESIUM: 3.1 MMOL/L (ref 3.5–5)
RBC # BLD: 2.9 M/UL (ref 4.2–5.4)
SODIUM BLD-SCNC: 145 MMOL/L (ref 136–145)
TOTAL PROTEIN: 5 G/DL (ref 6.6–8.7)
VITAMIN B-12: 443 PG/ML (ref 211–946)
WBC # BLD: 11.2 K/UL (ref 4.8–10.8)

## 2021-01-22 PROCEDURE — 36415 COLL VENOUS BLD VENIPUNCTURE: CPT

## 2021-01-22 PROCEDURE — 85025 COMPLETE CBC W/AUTO DIFF WBC: CPT

## 2021-01-22 PROCEDURE — 6370000000 HC RX 637 (ALT 250 FOR IP): Performed by: FAMILY MEDICINE

## 2021-01-22 PROCEDURE — 6360000002 HC RX W HCPCS: Performed by: FAMILY MEDICINE

## 2021-01-22 PROCEDURE — 2580000003 HC RX 258: Performed by: STUDENT IN AN ORGANIZED HEALTH CARE EDUCATION/TRAINING PROGRAM

## 2021-01-22 PROCEDURE — 82947 ASSAY GLUCOSE BLOOD QUANT: CPT

## 2021-01-22 PROCEDURE — 51798 US URINE CAPACITY MEASURE: CPT

## 2021-01-22 PROCEDURE — 82607 VITAMIN B-12: CPT

## 2021-01-22 PROCEDURE — 6360000002 HC RX W HCPCS: Performed by: STUDENT IN AN ORGANIZED HEALTH CARE EDUCATION/TRAINING PROGRAM

## 2021-01-22 PROCEDURE — 84100 ASSAY OF PHOSPHORUS: CPT

## 2021-01-22 PROCEDURE — 80053 COMPREHEN METABOLIC PANEL: CPT

## 2021-01-22 PROCEDURE — 2580000003 HC RX 258: Performed by: FAMILY MEDICINE

## 2021-01-22 PROCEDURE — 83735 ASSAY OF MAGNESIUM: CPT

## 2021-01-22 PROCEDURE — 84145 PROCALCITONIN (PCT): CPT

## 2021-01-22 PROCEDURE — 92526 ORAL FUNCTION THERAPY: CPT

## 2021-01-22 PROCEDURE — 1210000000 HC MED SURG R&B

## 2021-01-22 PROCEDURE — 97162 PT EVAL MOD COMPLEX 30 MIN: CPT

## 2021-01-22 PROCEDURE — 2580000003 HC RX 258

## 2021-01-22 PROCEDURE — 97530 THERAPEUTIC ACTIVITIES: CPT

## 2021-01-22 PROCEDURE — 82746 ASSAY OF FOLIC ACID SERUM: CPT

## 2021-01-22 RX ORDER — DEXTROSE MONOHYDRATE 25 G/50ML
25 INJECTION, SOLUTION INTRAVENOUS ONCE
Status: COMPLETED | OUTPATIENT
Start: 2021-01-22 | End: 2021-01-22

## 2021-01-22 RX ORDER — DEXTROSE MONOHYDRATE 50 MG/ML
100 INJECTION, SOLUTION INTRAVENOUS PRN
Status: DISCONTINUED | OUTPATIENT
Start: 2021-01-22 | End: 2021-01-24 | Stop reason: HOSPADM

## 2021-01-22 RX ORDER — DEXTROSE MONOHYDRATE 25 G/50ML
12.5 INJECTION, SOLUTION INTRAVENOUS PRN
Status: DISCONTINUED | OUTPATIENT
Start: 2021-01-22 | End: 2021-01-24 | Stop reason: HOSPADM

## 2021-01-22 RX ORDER — NICOTINE POLACRILEX 4 MG
15 LOZENGE BUCCAL PRN
Status: DISCONTINUED | OUTPATIENT
Start: 2021-01-22 | End: 2021-01-23 | Stop reason: SDUPTHER

## 2021-01-22 RX ORDER — DEXTROSE MONOHYDRATE 50 MG/ML
100 INJECTION, SOLUTION INTRAVENOUS PRN
Status: DISCONTINUED | OUTPATIENT
Start: 2021-01-22 | End: 2021-01-23 | Stop reason: SDUPTHER

## 2021-01-22 RX ORDER — NICOTINE POLACRILEX 4 MG
15 LOZENGE BUCCAL PRN
Status: DISCONTINUED | OUTPATIENT
Start: 2021-01-22 | End: 2021-01-24 | Stop reason: HOSPADM

## 2021-01-22 RX ORDER — POTASSIUM CHLORIDE 7.45 MG/ML
10 INJECTION INTRAVENOUS PRN
Status: DISCONTINUED | OUTPATIENT
Start: 2021-01-22 | End: 2021-01-24 | Stop reason: HOSPADM

## 2021-01-22 RX ORDER — TAMSULOSIN HYDROCHLORIDE 0.4 MG/1
0.4 CAPSULE ORAL DAILY
Status: DISCONTINUED | OUTPATIENT
Start: 2021-01-22 | End: 2021-01-24 | Stop reason: HOSPADM

## 2021-01-22 RX ORDER — DEXTROSE MONOHYDRATE 25 G/50ML
12.5 INJECTION, SOLUTION INTRAVENOUS PRN
Status: DISCONTINUED | OUTPATIENT
Start: 2021-01-22 | End: 2021-01-23 | Stop reason: SDUPTHER

## 2021-01-22 RX ORDER — DEXTROSE MONOHYDRATE 25 G/50ML
INJECTION, SOLUTION INTRAVENOUS
Status: COMPLETED
Start: 2021-01-22 | End: 2021-01-22

## 2021-01-22 RX ADMIN — DEXTROSE MONOHYDRATE 12.5 G: 25 INJECTION, SOLUTION INTRAVENOUS at 03:32

## 2021-01-22 RX ADMIN — POTASSIUM CHLORIDE 10 MEQ: 10 INJECTION, SOLUTION INTRAVENOUS at 20:37

## 2021-01-22 RX ADMIN — DEXTROSE MONOHYDRATE 12.5 G: 500 INJECTION PARENTERAL at 03:32

## 2021-01-22 RX ADMIN — SODIUM CHLORIDE: 9 INJECTION, SOLUTION INTRAVENOUS at 08:31

## 2021-01-22 RX ADMIN — CARVEDILOL 25 MG: 12.5 TABLET, FILM COATED ORAL at 08:32

## 2021-01-22 RX ADMIN — DEXTROSE MONOHYDRATE 25 G: 25 INJECTION, SOLUTION INTRAVENOUS at 03:30

## 2021-01-22 RX ADMIN — CEFTRIAXONE 1000 MG: 1 INJECTION, POWDER, FOR SOLUTION INTRAMUSCULAR; INTRAVENOUS at 08:32

## 2021-01-22 RX ADMIN — AZITHROMYCIN MONOHYDRATE 500 MG: 250 TABLET ORAL at 20:37

## 2021-01-22 RX ADMIN — OYSTER SHELL CALCIUM WITH VITAMIN D 1 TABLET: 500; 200 TABLET, FILM COATED ORAL at 08:34

## 2021-01-22 RX ADMIN — ENOXAPARIN SODIUM 40 MG: 40 INJECTION SUBCUTANEOUS at 08:32

## 2021-01-22 RX ADMIN — Medication 10 ML: at 08:45

## 2021-01-22 NOTE — PROGRESS NOTES
Physical Therapy    Facility/Department: [facility]/VAS/MED  Initial Assessment    NAME: Augie Muniz  : 1926  MRN: [id number]    Date of Service: 2021    Discharge Recommendations:  Continue to assess pending progress, Patient would benefit from continued therapy after discharge, 24 hour supervision or assist        Assessment   Body structures, Functions, Activity limitations: Decreased functional mobility ; Decreased ROM; Decreased strength;Decreased safe awareness;Decreased cognition;Decreased posture; Increased pain;Decreased balance;Decreased coordination  Assessment: Pt. will benefit from PT if she is cooperative and willing to participate. Pt. a fall risk and should not attempt mobility on her own at this time. Pt. unable to tolerate sitting EOB for more than 2 mins and became agitated with mobility. Anticipate pt will have a difficult time caring for the pt at home, unless she is completely bedbound. Treatment Diagnosis: debility  Prognosis: Poor  Decision Making: Medium Complexity  PT Education: Goals;PT Role;Plan of Care;General Safety;Transfer Training;Functional Mobility Training  Patient Education: use of call light  Barriers to Learning: pt did not open eyes, memory issues? REQUIRES PT FOLLOW UP: Yes  Activity Tolerance  Activity Tolerance: Patient limited by cognitive status; Patient limited by fatigue;Patient limited by pain;Treatment limited secondary to agitation       Patient Diagnosis(es): The primary encounter diagnosis was Altered mental status, unspecified altered mental status type. Diagnoses of Sepsis, due to unspecified organism, unspecified whether acute organ dysfunction present [facility]), Atrial fibrillation, unspecified type ([facility] Urinary tract infection without hematuria, site unspecified, Abnormal CT scan, and Abnormal x-ray were also pertinent to this visit.      has a past medical history of Closed right hip fracture, initial encounter ([facility] Hyperlipidemia, Hypertension, and Palliative care patient. has a past surgical history that includes Thyroidectomy; pr reconstruc hip socket (Right, 1/18/2018); and hip pinning (Left, 1/8/2020). Restrictions  Restrictions/Precautions  Restrictions/Precautions: Fall Risk  Required Braces or Orthoses?: No  Vision/Hearing  Vision: Impaired(keeps eyes shut for most of PT)  Hearing: Exceptions to [facility]  Hearing Exceptions: Hard of hearing/hearing concerns     Subjective  General  Chart Reviewed: Yes  Patient assessed for rehabilitation services?: Yes  Response To Previous Treatment: Not applicable  Family / Caregiver Present: Yes  Referring Practitioner: eJaneth Samayoa MD  Referral Date : 01/20/21  Diagnosis: sepsis due to UTI, AMS, afib  Follows Commands: Impaired  General Comment  Comments: RN, Dariel Vasquez PT. Subjective  Subjective: \"I can walk a little bit. \" States she will try to sit up, but then starts saying,\"I don't Elaine Hurst go with you, I Elaine Hurst go home. \"  Pain Screening  Patient Currently in Pain: Yes  Pain Assessment  Pain Assessment: 0-10(unable to rate)  Response to Pain Intervention: Patient Satisfied  Vital Signs  Patient Currently in Pain: Yes  Pre Treatment Pain Screening  Intervention List: Patient able to continue with treatment    Orientation  Orientation  Overall Orientation Status: Impaired(pt would not state her name when asked and asked why she had to tell me. Knows she isn't at home)  Orientation Level: Disoriented to time;Disoriented to place; Disoriented to situation  Social/Functional History  Social/Functional History  Lives With: Family  Type of Home: House  Additional Comments: pt unable to give history and mumbles answers to questions  Cognition   Cognition  Overall Cognitive Status: Exceptions  Following Commands: Follows one step commands with increased time; Follows one step commands with repetition  Attention Span: Unable to maintain attention  Memory: Decreased recall of biographical Information;Decreased

## 2021-01-22 NOTE — PROGRESS NOTES
Progress Note  Date:2021       Room:0333/333-01  Patient Jose Bates     YOB: 1926     Age:89 y.o. Subjective    Subjective patient seen and examined this a.m. patient son Randell cAosta present at the bedside. Reviewed clinical summary as well as ongoing improvement with sepsis awaiting urinary culture. Continues to be at baseline dementia. Patient's  son  against hospice services at current time also declined home health services anticipating DC on  depending on clinical progress as well as urine culture growth. Cumulative hospital course: Patient was admitted , brought in by son due to continued clinical decline patient son reporting this has been started since diagnosis of Covid back in 2020. Work-up in the emergency room revealed patient to meet sepsis criteria likely due to underlying urinary tract infection. CTA was obtained which ruled out pulmonary embolism did reveal groundglass opacities in the peripheral fields bilaterally. CODE STATUS is DNR, patient noted to be sinus tachycardic which improved with fluid hydration as well as beta-blocker modality. Patient was admitted to medical floor as Covid screening was negative in the emergency room. Patient son requesting to talk with palliative services as he would ultimately like patient to return home under comfort measures. Continues on Ceftriaxone, blood cultures no growth to date, UCx in progress, appreciate PT/OT/SLP evaluations, seen by dietary consultation noted to have severe malnutrition.       Review of Systems   Unable to perform ROS: Dementia     Objective         Vitals Last 24 Hours:  TEMPERATURE:  Temp  Av °F (36.7 °C)  Min: 97.8 °F (36.6 °C)  Max: 98.6 °F (37 °C)  RESPIRATIONS RANGE: Resp  Av.5  Min: 16  Max: 18  PULSE OXIMETRY RANGE: SpO2  Av.3 %  Min: 97 %  Max: 99 %  PULSE RANGE: Pulse  Av.5  Min: 86  Max: 94  BLOOD PRESSURE RANGE: Systolic (45BXR), FGD:393 , Min:100 , FHM:884   ; Diastolic (54YSN), GWL:92, Min:64, Max:69    I/O (24Hr): Intake/Output Summary (Last 24 hours) at 1/22/2021 0634  Last data filed at 1/22/2021 0121  Gross per 24 hour   Intake 300 ml   Output --   Net 300 ml       Physical Exam  Vitals signs and nursing note reviewed. Constitutional:       Appearance: Clinical tone steadily improving     Comments: Fatigued appearing, resting in bed  HENT:      Head: Normocephalic and atraumatic.   -Severe torticollis  Eyes:      General:         Right eye: No discharge. Left eye: No discharge. Cardiovascular:      Rate and Rhythm: Tachycardia present. Pulses: Normal pulses. Comments: Sinus per telemetry monitor  Abdominal:      General: Bowel sounds are normal.      Tenderness: There is no guarding or rebound. Musculoskeletal:      Right lower leg: No edema. Left lower leg: No edema. Skin:     Coloration: Skin is pale. Stage 2 pressure injuries to coccyx and buttocks  Neurological:      Mental Status: She is alert. Mental status is at baseline. Comments: Underlying dementia evident     Labs/Imaging/Diagnostics    Labs:  CBC:  Recent Labs     01/20/21  1413 01/21/21  0057 01/22/21  0134   WBC 17.3* 13.7* 11.2*   RBC 3.74* 3.09* 2.90*   HGB 11.9* 10.0* 9.2*   HCT 38.3 30.9* 29.0*   .4* 100.0* 100.0*   RDW 14.9* 15.2* 15.8*    172 172     CHEMISTRIES:  Recent Labs     01/20/21  1413 01/21/21  0057 01/22/21  0134    142 145   K 4.6 3.8 3.1*    112* 116*   CO2 20* 19* 17*   BUN 26* 26* 22   CREATININE 0.6 0.5 0.4*   GLUCOSE 220* 87 54*   PHOS  --  2.5 2.6   MG  --   --  1.9     PT/INR:No results for input(s): PROTIME, INR in the last 72 hours. APTT:No results for input(s): APTT in the last 72 hours.   LIVER PROFILE:  Recent Labs     01/20/21  1413 01/21/21  0057 01/22/21  0134   AST 17 13 14   ALT 8 5 6   BILITOT 1.1 0.7 0.5   ALKPHOS 84 66 65       Imaging Last 24 Hours:  Ct Head Wo limited visualized cardiomediastinal structures show moderate cardiomegaly, particularly enlargement of the right and left left atrium and left ventricle. A moderately ectatic ascending thoracic aorta is seen measuring 4.2 cm in diameter. Severe atheromatous changes of coronary arteries are seen. The liver and spleen appear unremarkable. A significantly distended gallbladder seen with moderate enhancement of the wall. No surrounding fat infiltration or stranding. No surrounding fluid. No radiopaque calculi. Common bile duct is not distended. The pancreas appear normal. Normal pancreatic duct is visualized. There are degenerative glands are unremarkable. There are several well-defined sharply marginated renal nodules/masses in both kidneys. The largest mass in the upper pole of the right kidney measures 6 cm in diameter. The CT density suggest a cyst. The largest mass in the upper pole of the left kidney measures 6.5 cm. The CT density suggest a cyst. No radiopaque calculi. No hydronephrosis. The ureters are not well-visualized in this study. The urinary bladder is completely obscured by the extensive artifacts from the hip arthroplasty hardware. A small partially calcified uterus is seen. No adnexal masses. There is a large hiatal hernia containing a rounded high density object which probably represent a tablet/medication. Into abdominal stomach appear unremarkable and decompressed. Small bowel is nondistended. Appendix is partially visualized and appears unremarkable. Moderate gas and stool is seen in the colon. There is focal thickening of the distal sigmoid colon with narrowing of the lumen. This may represent peristalsis or an evolving neoplasm. There is diverticulosis of the colon. No finding to suggest diverticulitis. The distal sigmoid colon and rectum are suboptimally evaluated due to the artifacts. Severe atheromatous changes of the abdominal aorta and iliac arteries are seen.  Atheromatous plaques are seen in the proximal celiac and superior mesenteric arteries with more than 50% diameter stenosis of the celiac trunk. The images reviewed in bone window show moderate diffuse osteopenia of the bony structures. Chronic degenerative changes of the lumbar spine with a moderate dextroscoliosis. There is hardware internal fixation of the left femoral neck fracture. There is incomplete healing of the fracture. There is impaction of the bony fragments. One of the metallic nail is projecting into the superior aspect of the hip joint space. Right hip arthroplasty is incompletely visualized and evaluated. An area of moderate thickening of the wall and resultant narrowing of the distal sigmoid colon. This may be transitional due to peristalsis or an evolving neoplastic process. This may be further evaluated. The diverticulosis of the colon. No evidence for diverticulitis. Multiple well-defined low-density renal lesions probably represent renal cysts. No solid nodule. Evaluate distended gallbladder without stone. A large hiatal hernia. A small left basal pleural effusion. A healing ununited internally fixed fracture of the left femoral neck with hardware complication as detailed above. Moderate diffuse osteopenia. A moderate ectasia of the ascending thoracic aorta. No dissection. Moderate cardiomegaly. Signed by Dr Girish Rausch on 1/20/2021 4:38 PM    Cta Pulmonary W Contrast    Result Date: 1/20/2021  CTA PULMONARY W CONTRAST 1/20/2021 3:18 PM HISTORY: Possible PE COMPARISON: None. DLP: 230 mGy cm TECHNIQUE: Helical tomographic images of the chest were obtained after the administration of intravenous contrast following angiogram protocol. Additionally, 3D reformatted images were provided. FINDINGS:  Reason Limited examination. There is no evidence of pulmonary embolus. The ascending aorta measures up to 4.3 cm. Timing of contrast limits evaluation for a dissection.  Bilateral peripheral groundglass opacities and consolidative processes in the lower lobes and upper lobes, concerning for infectious process. Moderate cardiomegaly. Coronary artery disease. Trace pericardial effusion. No obvious mediastinal hilar or axillary lymphadenopathy by size criteria. Moderate to significant hiatal hernia. Chronic appearing wedging of T8 vertebral body. Please see report from CT abdomen pelvis dated for intra-abdominal findings. 1.   No evidence pulmonary embolus. 2. Groundglass and organized and consider processes in the peripheries bilaterally, concerning for infection. Signed by Dr Keren Bates on 1/20/2021 4:44 PM    Xr Hip 2-3 Vw W Pelvis Left    Result Date: 1/20/2021  EXAM: XR HIP 2-3 VW W PELVIS LEFT -- 1/20/2021 2:00 PM HISTORY: 89 years, Female, left hip pain COMPARISON: 12/22/2020, 7/18/2020 TECHNIQUE:  3 images. AP pelvis, AP and lateral views of the left hip FINDINGS:  Right hip replacement with distal stem incompletely visualized. There are 3 left femoral neck screws without evidence of hardware fracture or loosening. One of the screw tips appears to extend beyond the femoral head margin. There is similar shortening of the left femoral neck with a persistent femoral mass lucent fracture line. There is a fracture line extending to the articular surface, which appears new compared to priors. Diffuse bone demineralization and overlying bowel gas limit evaluation of the pelvis. Pubic symphysis and sacroiliac joints anatomic. Degenerative changes in the lumbar spine. 1. Left femoral head and neck fracture, favored to represent an acute on chronic process. This appears increased in conspicuity compared to 12/22/2020, which can be seen with additional interval fracture or osteolysis and healing of the previously described fracture. 2. Diffuse bone demineralization limits evaluation.  Signed by Dr Letty Layton on 1/20/2021 5:28 PM    Assessment//Plan           Hospital Problems           Last Modified POA    * (Principal) Sepsis due to urinary tract infection ([facility] [address] 1/20/2021 Yes    Essential hypertension 1/20/2021 Yes    Palliative care patient 1/21/2021 Yes    Severe malnutrition ([address] 1/21/2021 Yes    Altered mental status 1/21/2021 Yes    Abnormal CT scan 1/21/2021 Yes        Sepsis/acute cystitis with hematuria  -Sepsis resolved  - Suspected source: Urinary tract infection  - Urinalysis reviewed, urine culture in process, blood cultures no growth to date  - Continue IV antibiotics (ceftriaxone day #2, azithromycin day #2)  - Continue IVF  -Lactic acid normalized  - Will continue to monitor closely   -COVID-19 screening not detected        Deconditioned state/malnutrition/generalized weakness  -Dietary consultation  -Encourage oral intake  -Daily metabolic profile, monitor and correct electrolytes as needed  -SLP consultation, PT/OT  -Palliative care consultation for possible transition to comfort measures/hospice if no improvement with antibiotic therapy  -CT head reviewed-unremarkable acute findings  -CT cervical spine reviewed-no acute compression deformity or dislocation    Malnutrition Status:  Severe malnutrition    Context:  Chronic Illness     Findings of the 6 clinical characteristics of malnutrition:  Energy Intake:  7 - 75% or less estimated energy requirements for 1 month or longer  Weight Loss:  No significant weight loss     Body Fat Loss:  7 - Severe body fat loss Orbital, Buccal region   Muscle Mass Loss:  7 - Severe muscle mass loss Temples (temporalis), Clavicles (pectoralis & deltoids), Hand (interosseous)  Fluid Accumulation:  No significant fluid accumulation     Strength:  Not Performed     Sinus tachycardia  -Currently resolved  -Continue with patient's home Coreg twice daily  -Continue intravenous fluids  -Routine vitals     Incidental finding left femoral head/neck fracture/osteopenia  -Patient with previous percutaneous fixation January 2020  -Patient does not wish to proceed with surgical measures at current time  -Continue with calcium plus vitamin D supplementation     Finding on CT abdomen and pelvis moderate thickening of the wall and resultant narrowing of the distal sigmoid colon, peristalsis versus evolving neoplastic process?  -Patient currently DNR not wishing to pursue further measures       DVT prophylaxis-Lovenox     Discharge disposition-plans for DC possibly Sunday depending on urine culture growth, case discussed with patient's son Chaz Ayala at bedside today. Patient does not wish to entertain hospice nor home health services at current time. EMR Dragon/Transcription disclaimer:   Much of this encounter note is an electronic transcription/translation of spoken language to printed text.  The electronic translation of spoken language may permit erroneous, or at times, nonsensical words or phrases to be inadvertently transcribed; although attempts have made to review the note for such errors, some may still exist.    Electronically signed by   Izzy Palomino   Internal Medicine Hospitalist  On 1/22/2021  At 9:22 AM

## 2021-01-22 NOTE — PROGRESS NOTES
Speech Language Pathology  Facility/Department: [facility] 3 [facility]/VAS/MED  SWALLOW THERAPY     NAME: Re Grove  : 1926  MRN: [id number]    ADMISSION DATE: 2021  ADMITTING DIAGNOSIS: has Essential hypertension; Hyperlipidemia; Closed right hip fracture, initial encounter ([facility] [address] Acute blood loss as cause of postoperative anemia; Acute cystitis without hematuria; Iron deficiency anemia secondary to inadequate dietary iron intake; Hip fracture requiring operative repair, left, closed, initial encounter ([facility] [address] Elevated brain natriuretic peptide (BNP) level; Acute hypoxemic respiratory failure ([address] D-dimer, elevated; Sepsis ([facility] [address] Hyperkalemia; Sepsis due to urinary tract infection ([facility] [address] Palliative care patient; Severe malnutrition ([facility] [address] Altered mental status; and Abnormal CT scan on their problem list.    Date of Treat: 2021  Evaluating Therapist: Mara Cleary    Current Diet level:  Puree consistency with thin liquids    Reason for Referral  Re Grove was referred for a bedside swallow evaluation to assess the efficiency of her swallow function, identify signs and symptoms of aspiration and make recommendations regarding safe dietary consistencies, effective compensatory strategies, and safe eating environment. Impression  Monitored patient's swallowing function. Patient exhibits inconsistent oral holding prior to initiation of swallows, slow oral transit and suspected swallow delay with even the slowest moving PO consistency possible, and sluggish, moderately decreased laryngeal elevation for swallow airway protection. Even so, no outward S/S penetration/aspiration was noted with any puree consistency presentation or thin liquid presentation administered during treatment session this date.     At this time, would recommend continuation puree consistency and thin liquids. TOTAL FEED. Recommend meds crushed in pudding/applesauce.  Will continue to follow.     Treatment Plan  Requires SLP Intervention: Yes     Recommended Diet and Intervention  Diet Solids Recommendation: Puree  Liquid Consistency Recommendation: Thin   Recommended Form of Meds: Meds crushed in puree as able  Therapeutic Interventions: Patient/Family education;Diet tolerance monitoring; Therapeutic PO trials with SLP     Compensatory Swallowing Strategies  Compensatory Swallowing Strategies: Upright as possible for all oral intake;TOTAL FEED;Small bites/sips;Eat/Feed slowly; Alternate solids and liquids; Remain upright for 30-45 minutes after meals     Treatment/Goals  Timeframe for Short-term Goals: 1x/day for 3 days   Goal 1: Patient will tolerate puree consistency and thin liquids with min S/S penetration/aspiration during PO intake. Goal 2: Patient staff will follow swallow safety recommendations to decrease risk of penetration/aspiration during PO intake. Goal 3: Re-assessment of swallow function for potential diet upgrade.      General  Chart Reviewed: Yes  Behavior/Cognition: Alert;Confused  O2 Device: None (Room air)  Communication Observation: (Patient exhibits decreased volume of speech, decreased labial movements, and slow, decreased lingual movements during verbalizations.)  Follows Directions: Poor   Patient Positioning: Upright in bed; patient unable to hold head at midline  Consistencies Administered: Dysphagia Pureed (Dysphagia I); Thin - straw     Monitored patient's swallowing function with the following observations noted:     Oral Phase  Mastication: Puree (Patient exhibited slow oral prep with primarily decreased vertical jaw movement noted at the front of the mouth during puree consistency presentation administered by SLP.)  Suspected Premature Bolus Loss: Puree (Oral transit of puree consistency varied from 2-5 seconds.)  Oral Phase - Comment: Min puree consistency residue was observed post swallows; residue was slow but cleared from the mouth with additional dry swallows.  Oral transit of thin liquid presentations, administered via straw by SLP, primarily measured 1-2 seconds in length. It is noted that patient exhibited inconsistent oral holding, with both consistencies, prior to initiation of oral transit.     Pharyngeal Phase  Suspected Swallow Delay: Puree (Suspect secondary to oral transit times.)  Laryngeal Elevation: (Patient exhibited sluggish, moderately decreased laryngeal elevation for swallow airway protection.)  Pharyngeal Phase - Comment: No outward S/S penetration/aspiration was noted with any puree consistency presentation or thin liquid presentation administered during treatment session this date.     At this time, would recommend continuation puree consistency and thin liquids. TOTAL FEED. Recommend meds crushed in pudding/applesauce. Will continue to follow.     Electronically signed by CIRO Chi on 1/22/2021 at 12:18 PM

## 2021-01-22 NOTE — PROGRESS NOTES
Visited with pt to provide spiritual care. Pt was in bed but pt did not respond to my voice or questions. This  said a prayer for her but she did not respond.      Electronically signed by Rakel Callahan on 1/22/2021 at 1:44 PM

## 2021-01-22 NOTE — PROGRESS NOTES
Attempted to help patient eat lunch and patient said,\"Goddammit I said no. Leave me alone. \" Nurse notified.

## 2021-01-23 LAB
ALBUMIN SERPL-MCNC: 2.5 G/DL (ref 3.5–5.2)
ALP BLD-CCNC: 62 U/L (ref 35–104)
ALT SERPL-CCNC: 6 U/L (ref 5–33)
ANION GAP SERPL CALCULATED.3IONS-SCNC: 11 MMOL/L (ref 7–19)
AST SERPL-CCNC: 14 U/L (ref 5–32)
BASOPHILS ABSOLUTE: 0 K/UL (ref 0–0.2)
BASOPHILS RELATIVE PERCENT: 0.1 % (ref 0–1)
BILIRUB SERPL-MCNC: 0.4 MG/DL (ref 0.2–1.2)
BUN BLDV-MCNC: 23 MG/DL (ref 8–23)
CALCIUM SERPL-MCNC: 7.9 MG/DL (ref 8.2–9.6)
CHLORIDE BLD-SCNC: 120 MMOL/L (ref 98–111)
CO2: 16 MMOL/L (ref 22–29)
CREAT SERPL-MCNC: 0.4 MG/DL (ref 0.5–0.9)
EOSINOPHILS ABSOLUTE: 0.1 K/UL (ref 0–0.6)
EOSINOPHILS RELATIVE PERCENT: 0.6 % (ref 0–5)
GFR AFRICAN AMERICAN: >59
GFR NON-AFRICAN AMERICAN: >60
GLUCOSE BLD-MCNC: 186 MG/DL (ref 70–99)
GLUCOSE BLD-MCNC: 82 MG/DL (ref 74–109)
GLUCOSE BLD-MCNC: 95 MG/DL (ref 70–99)
GLUCOSE BLD-MCNC: 97 MG/DL (ref 70–99)
HCT VFR BLD CALC: 27.5 % (ref 37–47)
HEMOGLOBIN: 8.5 G/DL (ref 12–16)
IMMATURE GRANULOCYTES #: 0.2 K/UL
LYMPHOCYTES ABSOLUTE: 0.9 K/UL (ref 1.1–4.5)
LYMPHOCYTES RELATIVE PERCENT: 7.2 % (ref 20–40)
MAGNESIUM: 1.9 MG/DL (ref 1.7–2.3)
MCH RBC QN AUTO: 31.7 PG (ref 27–31)
MCHC RBC AUTO-ENTMCNC: 30.9 G/DL (ref 33–37)
MCV RBC AUTO: 102.6 FL (ref 81–99)
MONOCYTES ABSOLUTE: 1.2 K/UL (ref 0–0.9)
MONOCYTES RELATIVE PERCENT: 9.8 % (ref 0–10)
NEUTROPHILS ABSOLUTE: 9.6 K/UL (ref 1.5–7.5)
NEUTROPHILS RELATIVE PERCENT: 81 % (ref 50–65)
ORGANISM: ABNORMAL
PDW BLD-RTO: 16.1 % (ref 11.5–14.5)
PERFORMED ON: ABNORMAL
PERFORMED ON: NORMAL
PERFORMED ON: NORMAL
PLATELET # BLD: 160 K/UL (ref 130–400)
PMV BLD AUTO: 12.6 FL (ref 9.4–12.3)
POTASSIUM REFLEX MAGNESIUM: 2.9 MMOL/L (ref 3.5–5)
POTASSIUM SERPL-SCNC: 4.1 MMOL/L (ref 3.5–5)
PROCALCITONIN: 0.26 NG/ML (ref 0–0.09)
RBC # BLD: 2.68 M/UL (ref 4.2–5.4)
SODIUM BLD-SCNC: 147 MMOL/L (ref 136–145)
TOTAL PROTEIN: 4.7 G/DL (ref 6.6–8.7)
URINE CULTURE, ROUTINE: ABNORMAL
URINE CULTURE, ROUTINE: ABNORMAL
WBC # BLD: 11.8 K/UL (ref 4.8–10.8)

## 2021-01-23 PROCEDURE — 36415 COLL VENOUS BLD VENIPUNCTURE: CPT

## 2021-01-23 PROCEDURE — 92526 ORAL FUNCTION THERAPY: CPT

## 2021-01-23 PROCEDURE — 84132 ASSAY OF SERUM POTASSIUM: CPT

## 2021-01-23 PROCEDURE — 85025 COMPLETE CBC W/AUTO DIFF WBC: CPT

## 2021-01-23 PROCEDURE — 6360000002 HC RX W HCPCS: Performed by: FAMILY MEDICINE

## 2021-01-23 PROCEDURE — 2580000003 HC RX 258: Performed by: EMERGENCY MEDICINE

## 2021-01-23 PROCEDURE — 6370000000 HC RX 637 (ALT 250 FOR IP): Performed by: FAMILY MEDICINE

## 2021-01-23 PROCEDURE — 97110 THERAPEUTIC EXERCISES: CPT

## 2021-01-23 PROCEDURE — 82947 ASSAY GLUCOSE BLOOD QUANT: CPT

## 2021-01-23 PROCEDURE — 83735 ASSAY OF MAGNESIUM: CPT

## 2021-01-23 PROCEDURE — 2580000003 HC RX 258: Performed by: FAMILY MEDICINE

## 2021-01-23 PROCEDURE — 51701 INSERT BLADDER CATHETER: CPT

## 2021-01-23 PROCEDURE — 6360000002 HC RX W HCPCS: Performed by: STUDENT IN AN ORGANIZED HEALTH CARE EDUCATION/TRAINING PROGRAM

## 2021-01-23 PROCEDURE — 51798 US URINE CAPACITY MEASURE: CPT

## 2021-01-23 PROCEDURE — 80053 COMPREHEN METABOLIC PANEL: CPT

## 2021-01-23 PROCEDURE — 1210000000 HC MED SURG R&B

## 2021-01-23 RX ORDER — FOLIC ACID 1 MG/1
1 TABLET ORAL DAILY
Status: DISCONTINUED | OUTPATIENT
Start: 2021-01-23 | End: 2021-01-24 | Stop reason: HOSPADM

## 2021-01-23 RX ORDER — DEXTROSE MONOHYDRATE 50 MG/ML
INJECTION, SOLUTION INTRAVENOUS CONTINUOUS
Status: DISCONTINUED | OUTPATIENT
Start: 2021-01-23 | End: 2021-01-24 | Stop reason: HOSPADM

## 2021-01-23 RX ADMIN — CEFTRIAXONE 1000 MG: 1 INJECTION, POWDER, FOR SOLUTION INTRAMUSCULAR; INTRAVENOUS at 07:52

## 2021-01-23 RX ADMIN — POTASSIUM CHLORIDE 10 MEQ: 10 INJECTION, SOLUTION INTRAVENOUS at 02:18

## 2021-01-23 RX ADMIN — OYSTER SHELL CALCIUM WITH VITAMIN D 1 TABLET: 500; 200 TABLET, FILM COATED ORAL at 07:53

## 2021-01-23 RX ADMIN — POTASSIUM CHLORIDE 10 MEQ: 10 INJECTION, SOLUTION INTRAVENOUS at 07:52

## 2021-01-23 RX ADMIN — Medication 10 ML: at 20:36

## 2021-01-23 RX ADMIN — ACETAMINOPHEN 650 MG: 325 TABLET ORAL at 12:31

## 2021-01-23 RX ADMIN — ENOXAPARIN SODIUM 40 MG: 40 INJECTION SUBCUTANEOUS at 07:53

## 2021-01-23 RX ADMIN — CARVEDILOL 25 MG: 12.5 TABLET, FILM COATED ORAL at 07:53

## 2021-01-23 RX ADMIN — POTASSIUM CHLORIDE 10 MEQ: 10 INJECTION, SOLUTION INTRAVENOUS at 06:15

## 2021-01-23 RX ADMIN — POTASSIUM CHLORIDE 10 MEQ: 10 INJECTION, SOLUTION INTRAVENOUS at 04:30

## 2021-01-23 RX ADMIN — FOLIC ACID 1 MG: 1 TABLET ORAL at 09:33

## 2021-01-23 RX ADMIN — SODIUM CHLORIDE: 9 INJECTION, SOLUTION INTRAVENOUS at 02:18

## 2021-01-23 RX ADMIN — POTASSIUM CHLORIDE 10 MEQ: 10 INJECTION, SOLUTION INTRAVENOUS at 03:20

## 2021-01-23 RX ADMIN — DEXTROSE MONOHYDRATE: 50 INJECTION, SOLUTION INTRAVENOUS at 09:34

## 2021-01-23 RX ADMIN — AZITHROMYCIN MONOHYDRATE 500 MG: 250 TABLET ORAL at 20:35

## 2021-01-23 NOTE — PROGRESS NOTES
Speech Language Pathology  Facility/Department: [facility] 3 [facility]/VAS/MED  Swallow treatment    NAME: Dipika Ruiz  : 1926  MRN: [id number]    ADMISSION DATE: 2021  ADMITTING DIAGNOSIS: has Essential hypertension; Hyperlipidemia; Closed right hip fracture, initial encounter ([facility] [address] Acute blood loss as cause of postoperative anemia; Acute cystitis without hematuria; Iron deficiency anemia secondary to inadequate dietary iron intake; Hip fracture requiring operative repair, left, closed, initial encounter ([address] Elevated brain natriuretic peptide (BNP) level; Acute hypoxemic respiratory failure ([address] D-dimer, elevated; Sepsis ([facility] [address] Hyperkalemia; Sepsis due to urinary tract infection ([facility] [address] Palliative care patient; Severe malnutrition ([address] Altered mental status; and Abnormal CT scan on their problem list.    Date of Eval: 2021  Evaluating Therapist: Derry Lundborg    Current Diet level:  Puree with thin liquids    Reason for Referral  Dipika Ruiz was referred for a bedside swallow evaluation to assess the efficiency of her swallow function, identify signs and symptoms of aspiration and make recommendations regarding safe dietary consistencies, effective compensatory strategies, and safe eating environment. Impression  Patients swallow function monitored. Patient refused any pureed consistency today. She had just ate with PCA. PCA stating patient did well and no reporting of coughing/choking noted with breakfast meal. Patient did have sips of thin liquids, however minimal. No overt s/s of aspiration was observed. At this time would recommend continuation of pureed consistency and thin liquids. TOTAL FEED. Oral care before and after meals to reduce bacteria in oral cavity. Meds crushed in applesauce/pudding. Will continue to follow.      Treatment Plan  Requires SLP Intervention: Yes    Recommended Diet and Intervention  Diet Solids Recommendation: Puree  Liquid Consistency Recommendation: Thin   Recommended Form of Meds: Meds crushed in puree as able  Therapeutic Interventions: Patient/Family education;Diet tolerance monitoring; Therapeutic PO trials with SLP      Compensatory Swallowing Strategies    Compensatory Swallowing Strategies: Upright as possible for all oral intake;TOTAL FEED;Small bites/sips;Eat/Feed slowly; Alternate solids and liquids; Remain upright for 30-45 minutes after meals    Treatment/Goals    Timeframe for Short-term Goals: 1x/day for 3 days   Goal 1: Patient will tolerate puree consistency and thin liquids with min S/S penetration/aspiration during PO intake. Goal 2: Patient staff will follow swallow safety recommendations to decrease risk of penetration/aspiration during PO intake. Goal 3: Re-assessment of swallow function for potential diet upgrade. General    Chart Reviewed: Yes  Behavior/Cognition: Alert;Confused  O2 Device: None (Room air)  Communication Observation: (Patient continues to be significantly confused. Patient calling/yelling out frequently. Patient unable to follow even simple 1 step commands. Patient not oriented to situation, place or time. Follows Directions: Poor   Patient Positioning: Upright in bed; patient unable to hold head at midline  Consistencies Administered: Dysphagia Pureed (Dysphagia I); Thin Radha Celeste    Electronically signed by CIRO Jessica on 1/23/2021 at 9:51 AM

## 2021-01-23 NOTE — PROGRESS NOTES
Progress Note  Date:2021       Room:0333/333-01  Patient Malissa Smieon     YOB: 1926     Age:89 y.o. Subjective    Subjective patient seen and examined this a.m. slightly more alert and conversational.  Nurse present at the bedside. Patient explained to ongoing treatment of urinary tract infection with E. coli growth on urine culture. Continues to be seen by PT SLP, diet down to puréed consistency. Plans for discharge home tomorrow. Cumulative hospital course: Patient was admitted , brought in by son due to continued clinical decline patient son reporting this has been started since diagnosis of Covid back in 2020. Work-up in the emergency room revealed patient to meet sepsis criteria likely due to underlying urinary tract infection. CTA was obtained which ruled out pulmonary embolism did reveal groundglass opacities in the peripheral fields bilaterally. CODE STATUS is DNR, patient noted to be sinus tachycardic which improved with fluid hydration as well as beta-blocker modality. Patient was admitted to medical floor as Covid screening was negative in the emergency room. Patient son requesting to talk with palliative services as he would ultimately like patient to return home under comfort measures. Continues on Ceftriaxone, blood cultures no growth to date, UCx with growth of E. coli, seen and evaluated by PT/OT as well as SLP, noted to have severe malnutrition by dietary consultation.         Review of Systems   Unable to perform ROS: Dementia     Objective         Vitals Last 24 Hours:  TEMPERATURE:  Temp  Av.2 °F (36.8 °C)  Min: 98 °F (36.7 °C)  Max: 98.5 °F (36.9 °C)  RESPIRATIONS RANGE: Resp  Avg: 15.5  Min: 12  Max: 18  PULSE OXIMETRY RANGE: SpO2  Av.8 %  Min: 94 %  Max: 97 %  PULSE RANGE: Pulse  Av.5  Min: 69  Max: 103  BLOOD PRESSURE RANGE: Systolic (12PXD), LOT:511 , Min:74 , SGP:820   ; Diastolic (32UFE), EMJ:68, Min:45, Max:83    I/O (24Hr): Intake/Output Summary (Last 24 hours) at 1/23/2021 1124  Last data filed at 1/23/2021 1112  Gross per 24 hour   Intake 360 ml   Output --   Net 360 ml       Physical Exam  Vitals signs and nursing note reviewed. Constitutional:       Appearance: Clinical tone steadily improving     Comments:  resting in bed  HENT:      Head: Normocephalic and atraumatic.   -Severe torticollis  Eyes:      General:         Right eye: No discharge. Left eye: No discharge. Cardiovascular:      Rate and Rhythm: Tachycardia present. Pulses: Normal pulses. Comments: Sinus per telemetry monitor  Abdominal:      General: Bowel sounds are normal.      Tenderness: There is no guarding or rebound. Musculoskeletal:      Right lower leg: No edema. Left lower leg: No edema. Skin:     Coloration: Skin is pale. Stage 2 pressure injuries to coccyx and buttocks  Neurological:      Mental Status: She is alert. Mental status is at baseline. Comments: Underlying dementia evident     Labs/Imaging/Diagnostics    Labs:  CBC:  Recent Labs     01/21/21 0057 01/22/21 0134 01/23/21 0121   WBC 13.7* 11.2* 11.8*   RBC 3.09* 2.90* 2.68*   HGB 10.0* 9.2* 8.5*   HCT 30.9* 29.0* 27.5*   .0* 100.0* 102.6*   RDW 15.2* 15.8* 16.1*    172 160     CHEMISTRIES:  Recent Labs     01/21/21 0057 01/22/21 0134 01/23/21  0121 01/23/21  1018    145 147*  --    K 3.8 3.1* 2.9* 4.1   * 116* 120*  --    CO2 19* 17* 16*  --    BUN 26* 22 23  --    CREATININE 0.5 0.4* 0.4*  --    GLUCOSE 87 54* 82  --    PHOS 2.5 2.6  --   --    MG  --  1.9 1.9  --      PT/INR:No results for input(s): PROTIME, INR in the last 72 hours. APTT:No results for input(s): APTT in the last 72 hours.   LIVER PROFILE:  Recent Labs     01/21/21 0057 01/22/21  0134 01/23/21  0121   AST 13 14 14   ALT 5 6 6   BILITOT 0.7 0.5 0.4   ALKPHOS 66 65 62       Imaging Last 24 Hours:  Ct Head Wo Contrast    Result Date: cardiomediastinal structures show moderate cardiomegaly, particularly enlargement of the right and left left atrium and left ventricle. A moderately ectatic ascending thoracic aorta is seen measuring 4.2 cm in diameter. Severe atheromatous changes of coronary arteries are seen. The liver and spleen appear unremarkable. A significantly distended gallbladder seen with moderate enhancement of the wall. No surrounding fat infiltration or stranding. No surrounding fluid. No radiopaque calculi. Common bile duct is not distended. The pancreas appear normal. Normal pancreatic duct is visualized. There are degenerative glands are unremarkable. There are several well-defined sharply marginated renal nodules/masses in both kidneys. The largest mass in the upper pole of the right kidney measures 6 cm in diameter. The CT density suggest a cyst. The largest mass in the upper pole of the left kidney measures 6.5 cm. The CT density suggest a cyst. No radiopaque calculi. No hydronephrosis. The ureters are not well-visualized in this study. The urinary bladder is completely obscured by the extensive artifacts from the hip arthroplasty hardware. A small partially calcified uterus is seen. No adnexal masses. There is a large hiatal hernia containing a rounded high density object which probably represent a tablet/medication. Into abdominal stomach appear unremarkable and decompressed. Small bowel is nondistended. Appendix is partially visualized and appears unremarkable. Moderate gas and stool is seen in the colon. There is focal thickening of the distal sigmoid colon with narrowing of the lumen. This may represent peristalsis or an evolving neoplasm. There is diverticulosis of the colon. No finding to suggest diverticulitis. The distal sigmoid colon and rectum are suboptimally evaluated due to the artifacts. Severe atheromatous changes of the abdominal aorta and iliac arteries are seen.  Atheromatous plaques are seen in the proximal the lower lobes and upper lobes, concerning for infectious process. Moderate cardiomegaly. Coronary artery disease. Trace pericardial effusion. No obvious mediastinal hilar or axillary lymphadenopathy by size criteria. Moderate to significant hiatal hernia. Chronic appearing wedging of T8 vertebral body. Please see report from CT abdomen pelvis dated for intra-abdominal findings. 1.   No evidence pulmonary embolus. 2. Groundglass and organized and consider processes in the peripheries bilaterally, concerning for infection. Signed by Dr Antonio Bonilla on 1/20/2021 4:44 PM    Xr Hip 2-3 Vw W Pelvis Left    Result Date: 1/20/2021  EXAM: XR HIP 2-3 VW W PELVIS LEFT -- 1/20/2021 2:00 PM HISTORY: 89 years, Female, left hip pain COMPARISON: 12/22/2020, 7/18/2020 TECHNIQUE:  3 images. AP pelvis, AP and lateral views of the left hip FINDINGS:  Right hip replacement with distal stem incompletely visualized. There are 3 left femoral neck screws without evidence of hardware fracture or loosening. One of the screw tips appears to extend beyond the femoral head margin. There is similar shortening of the left femoral neck with a persistent femoral mass lucent fracture line. There is a fracture line extending to the articular surface, which appears new compared to priors. Diffuse bone demineralization and overlying bowel gas limit evaluation of the pelvis. Pubic symphysis and sacroiliac joints anatomic. Degenerative changes in the lumbar spine. 1. Left femoral head and neck fracture, favored to represent an acute on chronic process. This appears increased in conspicuity compared to 12/22/2020, which can be seen with additional interval fracture or osteolysis and healing of the previously described fracture. 2. Diffuse bone demineralization limits evaluation.  Signed by Dr Jenna Becerra on 1/20/2021 5:28 PM    Assessment//Plan           Hospital Problems           Last Modified POA    * (Principal) Sepsis due to urinary tract infection ([facility] [address] 1/20/2021 Yes    Essential hypertension 1/20/2021 Yes    Palliative care patient 1/21/2021 Yes    Severe malnutrition ([address] 1/21/2021 Yes    Altered mental status 1/21/2021 Yes    Abnormal CT scan 1/21/2021 Yes        Sepsis/acute cystitis with hematuria  -Sepsis resolved  - Suspected source: Urinary tract infection  - Urinalysis reviewed, urine culture with growth of E. coli, blood cultures no growth to date  - Continue IV antibiotics (ceftriaxone day #3, azithromycin day #3)  -  Plan to transition Omnicef p.o. upon discharge  - Continue IVF  -Lactic acid normalized  - Will continue to monitor closely   -COVID-19 screening not detected      Deconditioned state/malnutrition/generalized weakness  -Dietary consultation  -Encourage oral intake  -Daily metabolic profile, monitor and correct electrolytes as needed  -SLP consultation, PT/OT  -Palliative care consultation for possible transition to comfort measures/hospice if no improvement with antibiotic therapy  -CT head reviewed-unremarkable acute findings  -CT cervical spine reviewed-no acute compression deformity or dislocation    Malnutrition Status:  Severe malnutrition    Context:  Chronic Illness     Findings of the 6 clinical characteristics of malnutrition:  Energy Intake:  7 - 75% or less estimated energy requirements for 1 month or longer  Weight Loss:  No significant weight loss     Body Fat Loss:  7 - Severe body fat loss Orbital, Buccal region   Muscle Mass Loss:  7 - Severe muscle mass loss Temples (temporalis), Clavicles (pectoralis & deltoids), Hand (interosseous)  Fluid Accumulation:  No significant fluid accumulation     Strength:  Not Performed     Sinus tachycardia  -Currently resolved  -Continue with patient's home Coreg twice daily  -Continue intravenous fluids  -Routine vitals     Incidental finding left femoral head/neck fracture/osteopenia  -Patient with previous percutaneous fixation January 2020  -Patient does not wish to proceed with surgical measures at current time  -Continue with calcium plus vitamin D supplementation     Finding on CT abdomen and pelvis moderate thickening of the wall and resultant narrowing of the distal sigmoid colon, peristalsis versus evolving neoplastic process?  -Patient currently DNR not wishing to pursue further measures       DVT prophylaxis-Lovenox     Discharge disposition-plans for DC tomorrow 1/24/2021 case discussed with patient's son Moon Laboy at bedside today. Patient does not wish to entertain hospice nor home health services at current time. EMR Dragon/Transcription disclaimer:   Much of this encounter note is an electronic transcription/translation of spoken language to printed text.  The electronic translation of spoken language may permit erroneous, or at times, nonsensical words or phrases to be inadvertently transcribed; although attempts have made to review the note for such errors, some may still exist.    Electronically signed by   Noah Manjarrez   Internal Medicine Hospitalist  On 1/23/2021  At 11:24 AM

## 2021-01-24 VITALS
SYSTOLIC BLOOD PRESSURE: 115 MMHG | DIASTOLIC BLOOD PRESSURE: 64 MMHG | WEIGHT: 89.25 LBS | RESPIRATION RATE: 18 BRPM | HEIGHT: 64 IN | OXYGEN SATURATION: 97 % | TEMPERATURE: 97.7 F | BODY MASS INDEX: 15.24 KG/M2 | HEART RATE: 86 BPM

## 2021-01-24 LAB
ANION GAP SERPL CALCULATED.3IONS-SCNC: 9 MMOL/L (ref 7–19)
BASOPHILS ABSOLUTE: 0 K/UL (ref 0–0.2)
BASOPHILS RELATIVE PERCENT: 0.2 % (ref 0–1)
BUN BLDV-MCNC: 19 MG/DL (ref 8–23)
CALCIUM SERPL-MCNC: 7.3 MG/DL (ref 8.2–9.6)
CHLORIDE BLD-SCNC: 114 MMOL/L (ref 98–111)
CO2: 18 MMOL/L (ref 22–29)
CREAT SERPL-MCNC: 0.4 MG/DL (ref 0.5–0.9)
EOSINOPHILS ABSOLUTE: 0.4 K/UL (ref 0–0.6)
EOSINOPHILS RELATIVE PERCENT: 3.9 % (ref 0–5)
GFR AFRICAN AMERICAN: >59
GFR NON-AFRICAN AMERICAN: >60
GLUCOSE BLD-MCNC: 101 MG/DL (ref 70–99)
GLUCOSE BLD-MCNC: 85 MG/DL (ref 74–109)
HCT VFR BLD CALC: 26.9 % (ref 37–47)
HEMOGLOBIN: 8.1 G/DL (ref 12–16)
IMMATURE GRANULOCYTES #: 0.2 K/UL
LYMPHOCYTES ABSOLUTE: 1.3 K/UL (ref 1.1–4.5)
LYMPHOCYTES RELATIVE PERCENT: 13.3 % (ref 20–40)
MAGNESIUM: 1.8 MG/DL (ref 1.7–2.3)
MCH RBC QN AUTO: 31.4 PG (ref 27–31)
MCHC RBC AUTO-ENTMCNC: 30.1 G/DL (ref 33–37)
MCV RBC AUTO: 104.3 FL (ref 81–99)
MONOCYTES ABSOLUTE: 1 K/UL (ref 0–0.9)
MONOCYTES RELATIVE PERCENT: 9.5 % (ref 0–10)
NEUTROPHILS ABSOLUTE: 7.2 K/UL (ref 1.5–7.5)
NEUTROPHILS RELATIVE PERCENT: 71.3 % (ref 50–65)
PDW BLD-RTO: 16.7 % (ref 11.5–14.5)
PERFORMED ON: ABNORMAL
PLATELET # BLD: 123 K/UL (ref 130–400)
PMV BLD AUTO: 12.3 FL (ref 9.4–12.3)
POTASSIUM REFLEX MAGNESIUM: 3.5 MMOL/L (ref 3.5–5)
RBC # BLD: 2.58 M/UL (ref 4.2–5.4)
SODIUM BLD-SCNC: 141 MMOL/L (ref 136–145)
WBC # BLD: 10.1 K/UL (ref 4.8–10.8)

## 2021-01-24 PROCEDURE — 6370000000 HC RX 637 (ALT 250 FOR IP): Performed by: FAMILY MEDICINE

## 2021-01-24 PROCEDURE — 85025 COMPLETE CBC W/AUTO DIFF WBC: CPT

## 2021-01-24 PROCEDURE — 80048 BASIC METABOLIC PNL TOTAL CA: CPT

## 2021-01-24 PROCEDURE — 51798 US URINE CAPACITY MEASURE: CPT

## 2021-01-24 PROCEDURE — 6360000002 HC RX W HCPCS: Performed by: FAMILY MEDICINE

## 2021-01-24 PROCEDURE — 82947 ASSAY GLUCOSE BLOOD QUANT: CPT

## 2021-01-24 PROCEDURE — 36415 COLL VENOUS BLD VENIPUNCTURE: CPT

## 2021-01-24 PROCEDURE — 2580000003 HC RX 258: Performed by: FAMILY MEDICINE

## 2021-01-24 PROCEDURE — 83735 ASSAY OF MAGNESIUM: CPT

## 2021-01-24 RX ORDER — CEPHALEXIN 500 MG/1
500 CAPSULE ORAL 2 TIMES DAILY
Qty: 14 CAPSULE | Refills: 0 | Status: SHIPPED | OUTPATIENT
Start: 2021-01-24 | End: 2021-01-31

## 2021-01-24 RX ORDER — FOLIC ACID 1 MG/1
1 TABLET ORAL DAILY
Qty: 30 TABLET | Refills: 0 | Status: SHIPPED | OUTPATIENT
Start: 2021-01-24

## 2021-01-24 RX ORDER — OYSTER SHELL CALCIUM WITH VITAMIN D 500; 200 MG/1; [IU]/1
1 TABLET, FILM COATED ORAL DAILY
Qty: 30 TABLET | Refills: 0 | Status: SHIPPED | OUTPATIENT
Start: 2021-01-24

## 2021-01-24 RX ADMIN — OYSTER SHELL CALCIUM WITH VITAMIN D 1 TABLET: 500; 200 TABLET, FILM COATED ORAL at 09:29

## 2021-01-24 RX ADMIN — CEFTRIAXONE 1000 MG: 1 INJECTION, POWDER, FOR SOLUTION INTRAMUSCULAR; INTRAVENOUS at 09:29

## 2021-01-24 RX ADMIN — FOLIC ACID 1 MG: 1 TABLET ORAL at 09:29

## 2021-01-24 RX ADMIN — ENOXAPARIN SODIUM 40 MG: 40 INJECTION SUBCUTANEOUS at 09:29

## 2021-01-24 RX ADMIN — Medication 10 ML: at 09:30

## 2021-01-24 NOTE — PROGRESS NOTES
[facility] dispatch called and en route to  patient for discharge to home.      Electronically signed by Cj Hernandez RN on 1/24/2021 at 9:55 AM

## 2021-01-24 NOTE — DISCHARGE INSTR - DIET

## 2021-01-24 NOTE — PROGRESS NOTES
Spoke with patient's son, Himanshu Garcia, who will be accepting patient at discharge to discuss discharge instructions via telephone. Explained that patient has been bedbound this entire admission as she is unable to transfer or ambulate, and while she could bounce back to her normal state pre-admission, he should also anticipate this as her \"new normal.\"   Instructions to turn patient every 2 hours while in the bed to alternate pressure were provided. I also explained that patient is incontinent of bowel and urine, and that it is important to keep her clean with zinc barrier cream applied after every episode of incontinence or bath to prevent moisture-related damage. Explained patient should be kept on a pureed diet with meds crushed in applesauce/pudding to prevent aspiration PNA. Instructions to give PO abx as prescribed until medication course is complete were provided, as well as information regarding dosage, administration, and side effects. Himanshu Garcia was receptive to teaching and agreeable to all discharge instructions.      Electronically signed by Luisa Smith RN on 1/24/2021 at 10:41 AM

## 2021-01-24 NOTE — DISCHARGE SUMMARY
Discharge Summary    Date:1/24/2021        Patient Suzanne Acuña     YOB: 1926     Age:89 y.o. Admit Date:1/20/2021   Admission Condition:poor   Discharged Condition:stable  Discharge Date: 01/24/21     Discharge Diagnoses   Principal Problem:    Sepsis due to urinary tract infection [facility])  Active Problems:    Essential hypertension    Palliative care patient    Severe malnutrition (HCC)    Altered mental status    Abnormal CT scan  Resolved Problems:    * No resolved hospital problems. [facility] AND [facility] Stay   Narrative of Hospital Course: Patient was admitted 1/20, brought in by son due to continued clinical decline patient son reporting this has been started since diagnosis of Covid back in December 2020. Work-up in the emergency room revealed patient to meet sepsis criteria likely due to underlying urinary tract infection. CTA was obtained which ruled out pulmonary embolism did reveal groundglass opacities in the peripheral fields bilaterally. CODE STATUS is DNR, patient noted to be sinus tachycardic which improved with fluid hydration as well as beta-blocker modality. Patient was admitted to medical floor as Covid screening was negative in the emergency room. Continues on Ceftriaxone, blood cultures no growth to date, UCx with growth of E. coli,  patient provided a prescription of oral Keflex medications for 7 days. Seen and evaluated by PT/OT as well as SLP, noted to have severe malnutrition by dietary consultation,   recommendation by speech therapy to continue with purée consistency of foods as well as thin liquids, assistance with total feeding. Patient's son Ken Kraus has met with palliative services as well as social work declining hospice/comfort measures, also declined home health services as there is arranged caretaker at home. Education modalities have been attached after visit summary, recommend follow-up with primary care provider in the next 1 to 2 weeks.     Consultants: IP CONSULT TO PALLIATIVE CARE  IP CONSULT TO DIETITIAN  IP CONSULT TO HOME CARE NEEDS    Time Spent on Discharge:  45  minutes were spent in patient examination, evaluation, counseling as well as medication reconciliation, prescriptions for required medications, discharge plan and follow up.       Surgeries/Procedures Performed:      Treatments:   IV hydration, antibiotics: ceftriaxone and azithromycin, analgesia: acetaminophen, cardiac meds: Carvedilol, anticoagulation: LMW heparin, respiratory therapy: O2, therapies: PT, OT and ST and electrolyte stabilization    Significant Diagnostic Studies:   Recent Labs:  CBC:   Lab Results   Component Value Date    WBC 10.1 01/24/2021    RBC 2.58 01/24/2021    HGB 8.1 01/24/2021    HCT 26.9 01/24/2021    .3 01/24/2021    MCH 31.4 01/24/2021    MCHC 30.1 01/24/2021    RDW 16.7 01/24/2021     01/24/2021     BMP:    Lab Results   Component Value Date    GLUCOSE 85 01/24/2021     01/24/2021    K 3.5 01/24/2021     01/24/2021    CO2 18 01/24/2021    ANIONGAP 9 01/24/2021    BUN 19 01/24/2021    CREATININE 0.4 01/24/2021    CALCIUM 7.3 01/24/2021    LABGLOM >60 01/24/2021    GFRAA >59 01/24/2021     HFP:    Lab Results   Component Value Date    PROT 4.7 01/23/2021     CMP:    Lab Results   Component Value Date    GLUCOSE 85 01/24/2021     01/24/2021    K 3.5 01/24/2021     01/24/2021    CO2 18 01/24/2021    BUN 19 01/24/2021    CREATININE 0.4 01/24/2021    ANIONGAP 9 01/24/2021    ALKPHOS 62 01/23/2021    ALT 6 01/23/2021    AST 14 01/23/2021    BILITOT 0.4 01/23/2021    LABALBU 2.5 01/23/2021    LABGLOM >60 01/24/2021    GFRAA >59 01/24/2021    PROT 4.7 01/23/2021    CALCIUM 7.3 01/24/2021     PT/INR:    Lab Results   Component Value Date    PROTIME 13.7 01/07/2020    INR 1.11 01/07/2020     PTT:   Lab Results   Component Value Date    APTT 28.1 01/07/2020     FLP:  No results found for: CHOL, TRIG, HDL  U/A:    Lab Results   Component Value Date    COLORU DARK YELLOW 01/20/2021    SPECGRAV 1.027 01/20/2021    PHUR 5.0 01/20/2021    PROTEINU 30 01/20/2021    GLUCOSEU Negative 01/20/2021    KETUA Negative 01/20/2021    BILIRUBINUR MODERATE 01/20/2021    UROBILINOGEN 1.0 01/20/2021    NITRU POSITIVE 01/20/2021    LEUKOCYTESUR MODERATE 01/20/2021     TSH:    Lab Results   Component Value Date    TSH 2.160 12/17/2017       Radiology Last 7 Days:  Ct Head Wo Contrast    Result Date: 1/20/2021  1. Limited examination, severely. 2.  Grossly, no acute intracranial abnormalities. Signed by Dr Maris Cali on 1/20/2021 4:21 PM    Ct Cervical Spine Wo Contrast    Result Date: 1/20/2021  Impression: Severely limited examination. Grossly, no acute compression deformity or dislocation. Signed by Dr Maris Cali on 1/20/2021 4:27 PM    Ct Abdomen Pelvis W Iv Contrast Additional Contrast? None    Result Date: 1/20/2021  An area of moderate thickening of the wall and resultant narrowing of the distal sigmoid colon. This may be transitional due to peristalsis or an evolving neoplastic process. This may be further evaluated. The diverticulosis of the colon. No evidence for diverticulitis. Multiple well-defined low-density renal lesions probably represent renal cysts. No solid nodule. Evaluate distended gallbladder without stone. A large hiatal hernia. A small left basal pleural effusion. A healing ununited internally fixed fracture of the left femoral neck with hardware complication as detailed above. Moderate diffuse osteopenia. A moderate ectasia of the ascending thoracic aorta. No dissection. Moderate cardiomegaly. Signed by Dr Filipe Patel on 1/20/2021 4:38 PM    Cta Pulmonary W Contrast    Result Date: 1/20/2021  1. No evidence pulmonary embolus. 2. Groundglass and organized and consider processes in the peripheries bilaterally, concerning for infection.  Signed by Dr Maris Cali on 1/20/2021 4:44 PM    Xr Hip 2-3 Vw W Pelvis Left    Result Date: 1/20/2021  1. Left femoral head and neck fracture, favored to represent an acute on chronic process. This appears increased in conspicuity compared to 12/22/2020, which can be seen with additional interval fracture or osteolysis and healing of the previously described fracture. 2. Diffuse bone demineralization limits evaluation. Signed by Dr Odell Hwang on 1/20/2021 5:28 PM    Physical Exam  Vitals signs and nursing note reviewed. Constitutional:       Appearance: Clinical tone steadily improving     Comments:  resting in bed  HENT:      Head: Normocephalic and atraumatic.   -Severe torticollis, however able to move this a.m. Eyes:      General:         Right eye: No discharge.         Left eye: No discharge. Cardiovascular:      Rate and Rhythm: Tachycardia present.      Pulses: Normal pulses.      Comments: Sinus per telemetry monitor  Abdominal:      General: Bowel sounds are normal.      Tenderness: There is no guarding or rebound. Musculoskeletal:      Right lower leg: No edema.      Left lower leg: No edema. Skin:     Coloration: Skin is pale. Stage 2 pressure injuries to coccyx and buttocks  Neurological:      Mental Status: She is alert. Mental status is at baseline.      Comments: Underlying dementia evident       Discharge Plan   Disposition: Home    Provider Follow-Up:   No follow-up provider specified.          Patient Instructions   Diet: Puréed consistency foods thin liquids, total feed assistance to prevent aspiration    Activity: activity as tolerated      Discharge Medications         Medication List      START taking these medications    calcium-vitamin D 500-200 MG-UNIT per tablet  Commonly known as: OSCAL-500  Take 1 tablet by mouth daily     cephALEXin 500 MG capsule  Commonly known as: KEFLEX  Take 1 capsule by mouth 2 times daily for 7 days     folic acid 1 MG tablet  Commonly known as: FOLVITE  Take 1 tablet by mouth daily        CONTINUE taking these medications carvedilol 25 MG tablet  Commonly known as: COREG     potassium citrate 10 MEQ (1080 MG) extended release tablet  Commonly known as: UROCIT-K           Where to Get Your Medications      These medications were sent to [facility] [address] [facility]  [address], [address] [address]    Phone: [phone]   · calcium-vitamin D 500-200 MG-UNIT per tablet  · cephALEXin 500 MG capsule  · folic acid 1 MG tablet         EMR Dragon/Transcription disclaimer:   Much of this encounter note is an electronic transcription/translation of spoken language to printed text.  The electronic translation of spoken language may permit erroneous, or at times, nonsensical words or phrases to be inadvertently transcribed; although attempts have made to review the note for such errors, some may still exist.    Electronically signed by   Cynthia Herrmann   Internal Medicine Hospitalist  On 1/24/2021  At 9:10 AM

## 2021-01-25 LAB
BLOOD CULTURE, ROUTINE: NORMAL
CULTURE, BLOOD 2: NORMAL

## 2023-01-02 NOTE — CONSULTS
Comprehensive Nutrition Assessment    Type and Reason for Visit:  Initial, Consult, Wound    Nutrition Recommendations/Plan: Dx Severe Malnutrition, start ONS TID    Nutrition Assessment:  Consult received for low clemente score. Pt presents with stg II pressure injuries to coccyx and buttocks. Pt also presents severely malnourished AEB severe muscle/fat wasting and decreased PO intake. Pt did not respond appropriately to questions at time of visit. Will start ONS TID and continue to monitor nutrition progression. Malnutrition Assessment:  Malnutrition Status:  Severe malnutrition    Context:  Chronic Illness     Findings of the 6 clinical characteristics of malnutrition:  Energy Intake:  7 - 75% or less estimated energy requirements for 1 month or longer  Weight Loss:  No significant weight loss     Body Fat Loss:  7 - Severe body fat loss Orbital, Buccal region   Muscle Mass Loss:  7 - Severe muscle mass loss Temples (temporalis), Clavicles (pectoralis & deltoids), Hand (interosseous)  Fluid Accumulation:  No significant fluid accumulation     Strength:  Not Performed    Estimated Daily Nutrient Needs:  Energy (kcal):  0790-4151; Weight Used for Energy Requirements:  Current     Protein (g):  ; Weight Used for Protein Requirements:  Current(1.5-2.0)       Fluid (ml/day):  5496-0084; Method Used for Fluid Requirements:  1 ml/kcal      Nutrition Related Findings:  severe muscle/fat wasting      Wounds:  Stage II(coccyx, buttocks)       Current Nutrition Therapies:    DIET DYSPHAGIA PUREED;   Dietary Nutrition Supplements: Standard High Calorie Oral Supplement    Anthropometric Measures:  · Height: 5' 4\" (162.6 cm)  · Current Body Weight: 110 lb (49.9 kg)   · Usual Body Weight: 107 lb (48.5 kg)(1/2020)     · Ideal Body Weight: 120 lbs; % Ideal Body Weight 91.7 %   · BMI: 18.9  · BMI Categories: Underweight (BMI less than 22) age over 72       Nutrition Diagnosis:   · Severe malnutrition, In context of Patient stating coughing is making him needing to throw up as well.    chronic illness related to cognitive or neurological impairment, inadequate protein-energy intake as evidenced by poor intake prior to admission, severe loss of subcutaneous fat, severe muscle loss      Nutrition Interventions:   Food and/or Nutrient Delivery:  Continue Current Diet, Start Oral Nutrition Supplement  Nutrition Education/Counseling:  No recommendation at this time   Coordination of Nutrition Care:  Continue to monitor while inpatient    Goals:  PO intake >50%, wt stable or increase 1-3 lbs/wk       Nutrition Monitoring and Evaluation:   Behavioral-Environmental Outcomes:  None Identified   Food/Nutrient Intake Outcomes:  Food and Nutrient Intake, Supplement Intake  Physical Signs/Symptoms Outcomes:  Biochemical Data, Nutrition Focused Physical Findings, Skin, Weight     Discharge Planning:    Continue Oral Nutrition Supplement     Electronically signed by Kathy Luque RD, LD on 1/21/21 at 11:31 AM CST    Contact: [phone]

## 2023-08-09 NOTE — PROGRESS NOTES
Patient will need w/c with elevating leg rests. Order received for DME call placed to [facility] to order w/c with leg rests awaiting a reply from  at this time. no radiation

## (undated) DEVICE — SYSTEM SKIN CLSR 22CM DERMBND PRINEO

## (undated) DEVICE — GLOVE SURG SZ 85 L12IN FNGR THK94MIL TRNSLUC YEL LTX

## (undated) DEVICE — PACK ANT HIP CDS

## (undated) DEVICE — CHLORAPREP 26ML ORANGE

## (undated) DEVICE — C-ARMOR C-ARM EQUIPMENT COVERS CLEAR STERILE UNIVERSAL FIT 12 PER CASE: Brand: C-ARMOR

## (undated) DEVICE — BIT DRL L300MM DIA5MM CANN QUIK CPL ADJ STP REUSE

## (undated) DEVICE — OSCILLATOR BLADE, 25.4 X 90 X 1.27 MM (.050"): Brand: CONMED

## (undated) DEVICE — GLOVE SURG SZ 85 L12IN FNGR THK87MIL DK GRN LTX FREE ISOLEX

## (undated) DEVICE — ADHESIVE SKIN CLSR 0.7ML TOP DERMBND ADV

## (undated) DEVICE — SOLUTION IV IRRIG POUR BRL 0.9% SODIUM CHL 2F7124

## (undated) DEVICE — SOLUTION IV 1000ML 0.9% SOD CHL PH 5 INJ USP VIAFLX PLAS

## (undated) DEVICE — GUIDEWIRE ORTH L300MM DIA2.8MM S STL THRD SHRP TRCR TIP FOR

## (undated) DEVICE — Z DISCONTINUED PER MEDLINE USE 2718072 DRESSING FOAM W5XL12.5CM SIL ADH THN BORDED CNFRM LO PROF

## (undated) DEVICE — SUTURE VCRL SZ 0 L27IN ABSRB UD L36MM CT-1 1/2 CIR J260H

## (undated) DEVICE — SUTURE VCRL SZ 2-0 L36IN ABSRB UD L36MM CT-1 1/2 CIR J945H

## (undated) DEVICE — Device

## (undated) DEVICE — GAUZE,SPONGE,8"X4",12PLY,XRAY,STRL,LF: Brand: MEDLINE

## (undated) DEVICE — SUTURE VCRL SZ 3-0 L27IN ABSRB UD L26MM SH 1/2 CIR J416H

## (undated) DEVICE — GLOVE SURG SZ 75 L12IN FNGR THK94MIL TRNSLUC YEL LTX

## (undated) DEVICE — GLOVE SURG SZ 9 L12IN FNGR THK13MIL BRN LTX SYN POLYMER W

## (undated) DEVICE — PAD,ABDOMINAL,5"X9",STERILE,LF,1/PK: Brand: MEDLINE INDUSTRIES, INC.

## (undated) DEVICE — 6617 IOBAN II PATIENT ISOLATION DRAPE 5/BX,4BX/CS: Brand: STERI-DRAPE™ IOBAN™ 2

## (undated) DEVICE — TOWEL,OR,DSP,ST,BLUE,DLX,4/PK,20PK/CS: Brand: MEDLINE

## (undated) DEVICE — Device: Brand: POWER-FLO®

## (undated) DEVICE — SURGICAL PROCEDURE PACK LOWER EXTREMITY LOURDES HOSP

## (undated) DEVICE — GAUZE,SPONGE,FLUFF,4"X4",12PLY,STRL,2'S: Brand: MEDLINE

## (undated) DEVICE — SMARTGOWN BREATHABLE SURGICAL GOWN: Brand: CONVERTORS

## (undated) DEVICE — DRESSING FOAM SELF ADH 20X10 CM ABSORBENT MEPILEX BORDER

## (undated) DEVICE — GLOVE SURG SZ 75 L12IN FNGR THK87MIL DK GRN LTX FREE ISOLEX

## (undated) DEVICE — KIT CARE PATIENT HANA PROFX

## (undated) DEVICE — C-ARM: Brand: UNBRANDED

## (undated) DEVICE — DISCONTINUED NO SUB IDED TG GLOVE SURG SENSICARE ALOE LT LF PF ST GRN SZ 8

## (undated) DEVICE — GLOVE SURG SZ 85 L12IN FNGR THK13MIL BRN LTX SYN POLYMER W

## (undated) DEVICE — LIGHT SUCT UNTETHERED SCINTILLANT